# Patient Record
Sex: FEMALE | Race: WHITE | NOT HISPANIC OR LATINO | Employment: OTHER | URBAN - METROPOLITAN AREA
[De-identification: names, ages, dates, MRNs, and addresses within clinical notes are randomized per-mention and may not be internally consistent; named-entity substitution may affect disease eponyms.]

---

## 2017-01-03 ENCOUNTER — HOSPITAL ENCOUNTER (OUTPATIENT)
Dept: LAB | Facility: MEDICAL CENTER | Age: 82
End: 2017-01-03
Attending: INTERNAL MEDICINE
Payer: MEDICARE

## 2017-01-03 LAB
ANION GAP SERPL CALC-SCNC: 5 MMOL/L (ref 0–11.9)
BUN SERPL-MCNC: 24 MG/DL (ref 8–22)
CALCIUM SERPL-MCNC: 9.8 MG/DL (ref 8.5–10.5)
CHLORIDE SERPL-SCNC: 107 MMOL/L (ref 96–112)
CO2 SERPL-SCNC: 26 MMOL/L (ref 20–33)
CREAT SERPL-MCNC: 0.82 MG/DL (ref 0.5–1.4)
GLUCOSE SERPL-MCNC: 86 MG/DL (ref 65–99)
POTASSIUM SERPL-SCNC: 3.7 MMOL/L (ref 3.6–5.5)
SODIUM SERPL-SCNC: 138 MMOL/L (ref 135–145)

## 2017-01-03 PROCEDURE — 80048 BASIC METABOLIC PNL TOTAL CA: CPT

## 2017-01-03 PROCEDURE — 36415 COLL VENOUS BLD VENIPUNCTURE: CPT

## 2017-03-02 ENCOUNTER — HOSPITAL ENCOUNTER (OUTPATIENT)
Dept: LAB | Facility: MEDICAL CENTER | Age: 82
End: 2017-03-02
Attending: INTERNAL MEDICINE
Payer: MEDICARE

## 2017-03-02 LAB
ALBUMIN SERPL BCP-MCNC: 4.2 G/DL (ref 3.2–4.9)
ALBUMIN/GLOB SERPL: 1.2 G/DL
ALP SERPL-CCNC: 104 U/L (ref 30–99)
ALT SERPL-CCNC: 13 U/L (ref 2–50)
ANION GAP SERPL CALC-SCNC: 7 MMOL/L (ref 0–11.9)
AST SERPL-CCNC: 20 U/L (ref 12–45)
BASOPHILS # BLD AUTO: 0.05 K/UL (ref 0–0.12)
BASOPHILS NFR BLD AUTO: 0.8 % (ref 0–1.8)
BILIRUB SERPL-MCNC: 0.7 MG/DL (ref 0.1–1.5)
BUN SERPL-MCNC: 26 MG/DL (ref 8–22)
CALCIUM SERPL-MCNC: 10.5 MG/DL (ref 8.5–10.5)
CHLORIDE SERPL-SCNC: 107 MMOL/L (ref 96–112)
CO2 SERPL-SCNC: 27 MMOL/L (ref 20–33)
CREAT SERPL-MCNC: 1.02 MG/DL (ref 0.5–1.4)
EOSINOPHIL # BLD: 0.16 K/UL (ref 0–0.51)
EOSINOPHIL NFR BLD AUTO: 2.4 % (ref 0–6.9)
ERYTHROCYTE [DISTWIDTH] IN BLOOD BY AUTOMATED COUNT: 46.8 FL (ref 35.9–50)
GLOBULIN SER CALC-MCNC: 3.5 G/DL (ref 1.9–3.5)
GLUCOSE SERPL-MCNC: 90 MG/DL (ref 65–99)
HCT VFR BLD AUTO: 44.2 % (ref 37–47)
HGB BLD-MCNC: 13.8 G/DL (ref 12–16)
IMM GRANULOCYTES # BLD AUTO: 0.03 K/UL (ref 0–0.11)
IMM GRANULOCYTES NFR BLD AUTO: 0.5 % (ref 0–0.9)
LYMPHOCYTES # BLD: 1.4 K/UL (ref 1–4.8)
LYMPHOCYTES NFR BLD AUTO: 21 % (ref 22–41)
MCH RBC QN AUTO: 29.5 PG (ref 27–33)
MCHC RBC AUTO-ENTMCNC: 31.2 G/DL (ref 33.6–35)
MCV RBC AUTO: 94.4 FL (ref 81.4–97.8)
MONOCYTES # BLD: 0.57 K/UL (ref 0–0.85)
MONOCYTES NFR BLD AUTO: 8.6 % (ref 0–13.4)
NEUTROPHILS # BLD: 4.45 K/UL (ref 2–7.15)
NEUTROPHILS NFR BLD AUTO: 66.7 % (ref 44–72)
NRBC # BLD AUTO: 0 K/UL
NRBC BLD-RTO: 0 /100 WBC
PLATELET # BLD AUTO: 156 K/UL (ref 164–446)
PMV BLD AUTO: 9.9 FL (ref 9–12.9)
POTASSIUM SERPL-SCNC: 4.3 MMOL/L (ref 3.6–5.5)
PROT SERPL-MCNC: 7.7 G/DL (ref 6–8.2)
RBC # BLD AUTO: 4.68 M/UL (ref 4.2–5.4)
SODIUM SERPL-SCNC: 141 MMOL/L (ref 135–145)
TSH SERPL DL<=0.005 MIU/L-ACNC: 3.53 UIU/ML (ref 0.3–3.7)
WBC # BLD AUTO: 6.7 K/UL (ref 4.8–10.8)

## 2017-03-02 PROCEDURE — 84443 ASSAY THYROID STIM HORMONE: CPT

## 2017-03-02 PROCEDURE — 36415 COLL VENOUS BLD VENIPUNCTURE: CPT

## 2017-03-02 PROCEDURE — 85025 COMPLETE CBC W/AUTO DIFF WBC: CPT

## 2017-03-02 PROCEDURE — 80053 COMPREHEN METABOLIC PANEL: CPT

## 2017-03-03 ENCOUNTER — NON-PROVIDER VISIT (OUTPATIENT)
Dept: CARDIOLOGY | Facility: MEDICAL CENTER | Age: 82
End: 2017-03-03
Payer: MEDICARE

## 2017-03-03 DIAGNOSIS — R00.2 PALPITATIONS: ICD-10-CM

## 2017-03-07 DIAGNOSIS — R00.2 PALPITATIONS: ICD-10-CM

## 2017-03-08 ENCOUNTER — HOSPITAL ENCOUNTER (OUTPATIENT)
Dept: RADIOLOGY | Facility: MEDICAL CENTER | Age: 82
End: 2017-03-08
Attending: INTERNAL MEDICINE
Payer: MEDICARE

## 2017-03-08 DIAGNOSIS — R00.2 PALPITATIONS: ICD-10-CM

## 2017-03-08 LAB — EKG IMPRESSION: NORMAL

## 2017-03-08 PROCEDURE — 93224 XTRNL ECG REC UP TO 48 HRS: CPT | Performed by: INTERNAL MEDICINE

## 2017-03-08 PROCEDURE — 700111 HCHG RX REV CODE 636 W/ 250 OVERRIDE (IP)

## 2017-03-08 PROCEDURE — A9555 RB82 RUBIDIUM: HCPCS

## 2017-03-08 PROCEDURE — 93017 CV STRESS TEST TRACING ONLY: CPT

## 2017-03-09 NOTE — PROCEDURES
PET MYOCARDIAL PERFUSION IMAGING SCAN    AGE:  86.    GENDER:  Female.    HEIGHT:  4 feet 10 inches.    WEIGHT:  127 pounds.    INDICATIONS:  Palpitations.    PROCEDURE:  The patient reviewed and signed the acknowledgement for testing   form.  The patient was in a fasting state and was properly prepared for   testing.  An intravenous line was inserted and a flush of normal saline   followed to insure line patency.    A transmission scan was acquired for attenuation correction using the internal   Germanium sources.  The patient was then administered 20.0 mCi of   Rubidium-82.  Approximately 90 seconds after the infusion, resting imagine   were obtained with ECG-gating.  Following the resting series, the patient   administered 33.0 mg of dipyridamole over four minutes.  The blood pressure,   heart rate and ECG were monitored and recorded.  After the dipyridamole   infusion was completed, another transmission scan for attenuation correction   was obtained.  The patient was then administered 20.0 mCi of Rubidium-82.    Approximately 90 seconds after the infusion, Peak stress images were obtained   with ECG-gating.    CLINICAL RESPONSE:  Resting blood pressure was 141/86 with a heart rate of 61.    Immediately post-dipyridamole infusion the blood pressure was 134/59 with a   heart rate of 76.  After a recovery period the blood pressure was 124/59 with   a heart rate of 79.    The patient experienced neck tightness, abdominal discomfort during testing.    Aminophylline 100 mg was administered following the scan.    ELECTROCARDIOGRAPHIC FINDINGS:  Baseline rhythm is sinus with no ischemic   changes.  With chemical stress, there are no ischemic EKG changes.    SCINTOGRAPHIC FINDINGS:  The QC data was reviewed and within acceptable   limits.  There is diaphragmatic attenuation noted.    GATED WALL MOTION FINDINGS:  Left ventricular ejection fraction is normal at   89% post-stress.    CONCLUSIONS AND IMPRESSIONS:  No  ischemic defects nor perfusion abnormality.    Normal left ventricular ejection fraction.       ____________________________________     MD ALEXYS SCANLON / YO    DD:  03/08/2017 17:29:14  DT:  03/08/2017 18:12:46    D#:  [id number]  Job#:  [id number]

## 2017-04-13 ENCOUNTER — OFFICE VISIT (OUTPATIENT)
Dept: INTERNAL MEDICINE | Facility: MEDICAL CENTER | Age: 82
End: 2017-04-13
Payer: MEDICARE

## 2017-04-13 VITALS
DIASTOLIC BLOOD PRESSURE: 80 MMHG | OXYGEN SATURATION: 96 % | HEART RATE: 86 BPM | SYSTOLIC BLOOD PRESSURE: 140 MMHG | TEMPERATURE: 98.4 F | BODY MASS INDEX: 26.09 KG/M2 | WEIGHT: 124.8 LBS

## 2017-04-13 DIAGNOSIS — I10 ESSENTIAL HYPERTENSION: ICD-10-CM

## 2017-04-13 DIAGNOSIS — E55.9 VITAMIN D DEFICIENCY: ICD-10-CM

## 2017-04-13 DIAGNOSIS — R42 DIZZINESS: ICD-10-CM

## 2017-04-13 DIAGNOSIS — M85.80 OSTEOPENIA: ICD-10-CM

## 2017-04-13 DIAGNOSIS — M19.019 PRIMARY OSTEOARTHRITIS OF SHOULDER, UNSPECIFIED LATERALITY: ICD-10-CM

## 2017-04-13 DIAGNOSIS — R07.89 CHEST DISCOMFORT: ICD-10-CM

## 2017-04-13 DIAGNOSIS — R10.84 GENERALIZED ABDOMINAL PAIN: ICD-10-CM

## 2017-04-13 PROCEDURE — G8419 CALC BMI OUT NRM PARAM NOF/U: HCPCS | Performed by: INTERNAL MEDICINE

## 2017-04-13 PROCEDURE — 1036F TOBACCO NON-USER: CPT | Performed by: INTERNAL MEDICINE

## 2017-04-13 PROCEDURE — G8432 DEP SCR NOT DOC, RNG: HCPCS | Performed by: INTERNAL MEDICINE

## 2017-04-13 PROCEDURE — G8598 ASA/ANTIPLAT THER USED: HCPCS | Performed by: INTERNAL MEDICINE

## 2017-04-13 PROCEDURE — 4040F PNEUMOC VAC/ADMIN/RCVD: CPT | Mod: 8P | Performed by: INTERNAL MEDICINE

## 2017-04-13 PROCEDURE — 1101F PT FALLS ASSESS-DOCD LE1/YR: CPT | Performed by: INTERNAL MEDICINE

## 2017-04-13 PROCEDURE — 99214 OFFICE O/P EST MOD 30 MIN: CPT | Performed by: INTERNAL MEDICINE

## 2017-04-13 RX ORDER — ACETAMINOPHEN 325 MG/1
650 TABLET ORAL EVERY 4 HOURS PRN
COMMUNITY
End: 2018-08-17

## 2017-04-13 NOTE — PATIENT INSTRUCTIONS
Increase acetaminophen to 325 to two pills and then 3 pills if needed  Will refer to bone density  Will send back to [facility]  Start miralax at 1/2 scoop per day and increase to two 1/2 scoops per day

## 2017-04-13 NOTE — MR AVS SNAPSHOT
Destiny Burns   2017 8:40 AM   Office Visit   MRN: [id number]    Department:  [facility] Med - Internal Med   Dept Phone:  [phone]    Description:  Female : 1930   Provider:  Jaswinder Iqbal M.D.           Reason for Visit     Follow-Up blood pressure    Leg Injury left leg redness    Pain           Allergies as of 2017     Allergen Noted Reactions    Acyclovir 2016       Ciprofloxacin 2016       Citalopram 2016       Hydralazine Hcl 2016       Lyrica 2016       Neurontin [Gabapentin] 2016       Norvasc [Amlodipine] 2016       Morphine 2016         You were diagnosed with     Chest discomfort   [940030]       Generalized abdominal pain   [306435]       Primary osteoarthritis of shoulder, unspecified laterality   [601068]       Vitamin D deficiency   [6313952]       Osteopenia   [269845]       Essential hypertension   [7191356]       Dizziness   [056823]         Vital Signs     Blood Pressure Pulse Temperature Weight Oxygen Saturation Smoking Status    140/80 mmHg 86 36.9 °C (98.4 °F) 56.609 kg (124 lb 12.8 oz) 96% Never Smoker       Basic Information     Date Of Birth Sex Race Ethnicity Preferred Language    1930 Female White Non- English      Your appointments     May 25, 2017  8:40 AM   Established Patient with Jaswinder Iqbal M.D.   [facility] Med - Internal Medicine (--)    [address] [place] [address] [id number] [phone]           You will be receiving a confirmation call a few days before your appointment from our automated call confirmation system.              Problem List              ICD-10-CM Priority Class Noted - Resolved    Essential hypertension, benign I10   2015 - Present    Esophageal reflux K21.9   2015 - Present    Cervical disc disease M50.90   2015 - Present    Heart murmur, systolic R01.1   2016 - Present    LLQ abdominal mass R19.04   2016 -  Present    Leg cramps R25.2   5/26/2016 - Present    Chronic neck pain M54.2, G89.29   5/26/2016 - Present    Dyslipidemia E78.5   5/26/2016 - Present    WILLIAN (renal artery stenosis) (CMS-HCC) I70.1   5/26/2016 - Present    Palpitations R00.2   5/26/2016 - Present    Chronic venous insufficiency I87.2   5/26/2016 - Present    Hypertension I10   5/26/2016 - Present    Generalized abdominal pain R10.84   7/21/2016 - Present    Shakiness R25.1   7/21/2016 - Present    Vitamin D deficiency E55.9   4/13/2017 - Present    Osteopenia M85.80   4/13/2017 - Present    Dizziness R42   4/13/2017 - Present    Primary osteoarthritis of shoulder M19.019   4/13/2017 - Present    Chest discomfort R07.89   4/13/2017 - Present      Health Maintenance        Date Due Completion Dates    IMM DTaP/Tdap/Td Vaccine (1 - Tdap) 9/24/1949 ---    PAP SMEAR 9/24/1951 ---    IMM ZOSTER VACCINE 9/24/1990 ---    IMM PNEUMOCOCCAL 65+ (ADULT) LOW/MEDIUM RISK SERIES (1 of 2 - PCV13) 9/24/1995 ---    BONE DENSITY 2/24/2016 2/24/2011 (Prv Comp)    Override on 2/24/2011: Previously completed    MAMMOGRAM 10/7/2017 10/7/2016, 9/30/2015, 9/29/2014, 9/27/2013, 9/26/2012, 9/21/2011, 9/20/2010, 9/18/2009, 9/18/2009, 9/15/2008, 9/15/2008, 9/11/2007, 9/11/2007, 9/1/2006, 9/29/2005, 9/27/2004            Current Immunizations     Influenza Vaccine Adult HD 9/29/2016      Below and/or attached are the medications your provider expects you to take. Review all of your home medications and newly ordered medications with your provider and/or pharmacist. Follow medication instructions as directed by your provider and/or pharmacist. Please keep your medication list with you and share with your provider. Update the information when medications are discontinued, doses are changed, or new medications (including over-the-counter products) are added; and carry medication information at all times in the event of emergency situations     Allergies:  ACYCLOVIR - (reactions not  documented)     CIPROFLOXACIN - (reactions not documented)     CITALOPRAM - (reactions not documented)     HYDRALAZINE HCL - (reactions not documented)     LYRICA - (reactions not documented)     NEURONTIN - (reactions not documented)     NORVASC - (reactions not documented)     MORPHINE - (reactions not documented)               Medications  Valid as of: May 01, 2017 -  9:44 AM    Generic Name Brand Name Tablet Size Instructions for use    Acetaminophen (Tab) TYLENOL 325 MG Take 650 mg by mouth every four hours as needed.        Aspirin (Chew Tab) ASA 81 MG Take 81 mg by mouth every day.        Cholecalciferol (Tab) cholecalciferol 1000 UNIT Take 1,000 Units by mouth every day.        Docusate Sodium (Cap) COLACE 100 MG Take 100 mg by mouth 2 times a day.        Doxazosin Mesylate (Tab) CARDURA 2 MG 2 pills at night        LORazepam (Tab) ATIVAN 0.5 MG Take 0.5 mg by mouth every four hours as needed for Anxiety.        Multiple Vitamins-Minerals (Cap) PRESERVISION AREDS 2  Take  by mouth.        Pantoprazole Sodium (Tablet Delayed Response) PROTONIX 40 MG TAKE 1 TABLET BY MOUTH ONCE DAILY        Polyethylene Glycol 3350   Take  by mouth.        RaNITidine HCl (Tab) ZANTAC 150 MG TAKE 1 TABLET BY MOUTH ONCE DAILY        Sennosides   Take  by mouth.        Simethicone (Chew Tab) MYLICON 80 MG Take 80 mg by mouth every 6 hours as needed.        Spironolactone (Tab) ALDACTONE 25 MG Half tablet daily        Valsartan (Tab) DIOVAN 320 MG Take 1 Tab by mouth every day.        .                 Medicines prescribed today were sent to:     [facility]    [address] [facility] [place] [address]    Phone: [phone] Fax: [phone]    Open 24 Hours?: No      Medication refill instructions:       If your prescription bottle indicates you have medication refills left, it is not necessary to call your provider’s office. Please contact your pharmacy and they will refill your  medication.    If your prescription bottle indicates you do not have any refills left, you may request refills at any time through one of the following ways: The online [employer] system (except Urgent Care), by calling your provider’s office, or by asking your pharmacy to contact your provider’s office with a refill request. Medication refills are processed only during regular business hours and may not be available until the next business day. Your provider may request additional information or to have a follow-up visit with you prior to refilling your medication.   *Please Note: Medication refills are assigned a new Rx number when refilled electronically. Your pharmacy may indicate that no refills were authorized even though a new prescription for the same medication is available at the pharmacy. Please request the medicine by name with the pharmacy before contacting your provider for a refill.        Your To Do List     Future Labs/Procedures Complete By Expires    BONE DENSITY TEST  As directed 4/13/2018      Instructions    Increase acetaminophen to 325 to two pills and then 3 pills if needed  Will refer to bone density  Will send back to [facility]  Start miralax at 1/2 scoop per day and increase to two 1/2 scoops per day          [employer] Access Code: [email or web address]  Expires: 5/31/2017  9:44 AM    [employer]  A secure, online tool to manage your health information     [employer]’s [employer]® is a secure, online tool that connects you to your personalized health information from the privacy of your home -- day or night - making it very easy for you to manage your healthcare. Once the activation process is completed, you can even access your medical information using the [employer] mera, which is available for free in the Apple Mera store or Google Play store.     [employer] provides the following levels of access (as shown below):   My Chart Features   [facility] Primary [facility] Doctor [facility]  Urgent  [facility]  [facility]  Primary [facility]  Doctor   Email your healthcare team securely and privately 24/7 X X X    Manage appointments: schedule your next appointment; view details of past/upcoming appointments X      Request prescription refills. X      View recent personal medical records, including lab and immunizations X X X X   View health record, including health history, allergies, medications X X X X   Read reports about your outpatient visits, procedures, consult and ER notes X X X X   See your discharge summary, which is a recap of your hospital and/or ER visit that includes your diagnosis, lab results, and care plan. X X       How to register for [employer]:  1. Go to  [email or web address].  2. Click on the Sign Up Now box, which takes you to the New Member Sign Up page. You will need to provide the following information:  a. Enter your [employer] Access Code exactly as it appears at the top of this page. (You will not need to use this code after you’ve completed the sign-up process. If you do not sign up before the expiration date, you must request a new code.)   b. Enter your date of birth.   c. Enter your home email address.   d. Click Submit, and follow the next screen’s instructions.  3. Create a [employer] ID. This will be your [employer] login ID and cannot be changed, so think of one that is secure and easy to remember.  4. Create a [employer] password. You can change your password at any time.  5. Enter your Password Reset Question and Answer. This can be used at a later time if you forget your password.   6. Enter your e-mail address. This allows you to receive e-mail notifications when new information is available in [employer].  7. Click Sign Up. You can now view your health information.    For assistance activating your [employer] account, call [phone]

## 2017-04-13 NOTE — PROGRESS NOTES
Established Patient    Ms. Lund a 86 y.o. female who presents today with:  CC: Follow-Up; Leg Injury; and Pain        Assessment and Plan    1. Chest discomfort  I suspect that her chest discomfort is actually esophageal spasms. Her PET scan was negative for cardiac ischemia. It appears that she has daily symptoms of gastroesophageal reflux disease and her chest pain radiates from the jaw all the way down the epigastrium. And from her description esophageal spasms is a very real possibility. It also appears that her Protonix and ranitidine are not controlling her daily symptoms. At one point she discussed with her gastroenterologist and EGD but this was not arranged. I suggest that she follow back with Dr. [facility] to discuss additional medications that may give her symptomatic relief.    2. Generalized abdominal pain  I agree that this is most likely constipation in nature. She does agree to try a half a scoop of MiraLAX at least once a day and if no improvement and she can tolerated increase to twice a day    3. Primary osteoarthritis of shoulder, unspecified laterality  I suggest that she increase her Tylenol Extra Strength 2-3 tablets per day as needed for pain. It appears that one tablet per day has provided some relief    4. Vitamin D deficiency  I will check a vitamin D level and bone density she should continue her vitamin D supplementation  - BONE DENSITY TEST; Future    5. Osteopenia  She had a prior history of osteopenia and with her history of vitamin D deficiency I will order a bone density  - BONE DENSITY TEST; Future    6. Essential hypertension  I suspected take control will be quite difficult in this patient. She would be at increased risk for decreased quality of life and side effects. This is currently being managed by Dr. Bloch. It appears that her average systolics are greater than 150 but her diastolics are in the 60-80 range. Fortunately she is not having side effects from her current  regimen.    7. Dizziness  I suspect that this is mostly orthostasis in nature. Fortunately it does not occur that often and she has not had any significant morbidity associated with this. I would not make any changes to her medications at this point      Current Outpatient Prescriptions   Medication Sig Dispense Refill   • vitamin D (CHOLECALCIFEROL) 1000 UNIT Tab Take 1,000 Units by mouth every day.     • acetaminophen (TYLENOL) 325 MG Tab Take 650 mg by mouth every four hours as needed.     • pantoprazole (PROTONIX) 40 MG Tablet Delayed Response TAKE 1 TABLET BY MOUTH ONCE DAILY 30 Tab 4   • ranitidine (ZANTAC) 150 MG Tab TAKE 1 TABLET BY MOUTH ONCE DAILY 30 Tab 5   • Multiple Vitamins-Minerals (PRESERVISION AREDS 2) Cap Take  by mouth.     • spironolactone (ALDACTONE) 25 MG Tab Half tablet daily 1 Tab 0   • valsartan (DIOVAN) 320 MG tablet Take 1 Tab by mouth every day. 30 Tab 3   • doxazosin (CARDURA) 2 MG Tab 2 pills at night 30 Tab 0   • Polyethylene Glycol 3350 (MIRALAX PO) Take  by mouth.     • Sennosides (SENNA LAX PO) Take  by mouth.     • docusate sodium (COLACE) 100 MG Cap Take 100 mg by mouth 2 times a day.     • lorazepam (ATIVAN) 0.5 MG Tab Take 0.5 mg by mouth every four hours as needed for Anxiety.     • aspirin (ASA) 81 MG CHEW chewable tablet Take 81 mg by mouth every day.     • simethicone (GAS-X) 80 MG CHEW Take 80 mg by mouth every 6 hours as needed.       No current facility-administered medications for this visit.         followup Return in about 6 weeks (around 5/25/2017) for Long.      _______________________________________________________    HPI: she has a long list of issues today. We addressed the most pressing and scheduled her for close followup for the others.   1. Chest discomfort  She is describing several months of atypical chest pain. She has episodic episodes of a sensation of pulling and palpitations in the central chest and a feeling of tachycardia after these events. When the  events occur they last for one half hour for 15 minutes in length. After the event is over she has a burning sensation from the bottom of the jaw down to her epigastric region. She has these episodes 1-2 times per week they are not depended on exertion and it can occur when she is at rest. It is not exacerbated by swallowing or eating however she has daily heartburn, regurgitation symptoms. She describes a sensation of food in her throat after she eats a meal and after significant swallowing it passes. She admits to dysphagia. The regurgitation symptoms occur almost every day. She denies any radiation of the chest pain to the left arm middle of the back or up the jaw. There is no diaphoresis or shortness of breath with these episodes. She currently is being treated with Protonix 40 mg daily plus ranitidine daily for several years. She is managed by Dr. [facility] of gastroenterology. She was seen several times last year. In August 2016 it appears that they had a conversation about an EGD but because of her age there was a risk of perforation and she decided not to do it.   She recently had a PET myocardial perfusion scan March 8 which was negative for ischemia  2. Generalized abdominal pain  Dr. [facility] has also been managing her chronic abdominal pain which is suspected to be secondary to constipation. It appears that he wanted her to take a combination of MiraLAX and Senokot but she was afraid of taking 2 medications and therefore she is taking nothing. Her pain is described as a 2 out of 3 bilateral lower abdomen and she does have daily constipation. She has no nausea or vomiting, hematemesis, melena, change in the caliber of her stool. The discomfort is not reproducible by touch. It feels as though she is bloated.    3. Primary osteoarthritis of shoulder, unspecified laterality  She has arthritis of the bilateral shoulders but chronic joint pain including the hands and knees, lower back and sometimes ankles. At the  last visit I suggested that she take Tylenol Extra Strength once a day. This helps her at night with sleep. She does not take any during the day. She denies any joint swelling, redness, fever, rash, trauma.    4. Vitamin D deficiency  She has a history of vitamin D deficiency. She also has history of osteopenia and has not had a bone density in quite some time. She is taking supplemental vitamin D over-the-counter. She denies any recent fractures or falls.    5. Osteopenia  She has a history of osteopenia diagnosed via DEXA scan several years ago. She has no history of fractures, alcohol use, tobacco use, family history of osteoporosis, hyperparathyroidism or chronic steroid use    6. Essential hypertension  Dr.Bloch is managing her hypertension. She currently is being treated with spironolactone, valsartan, doxazosin. She has had multiple changes to her hypertensive regimen mostly because a report of side effects associated with her medications. Currently she reports no side effects with her current regimen of 3 drugs. In the past it was suggested that she have renal artery stenosis surgery but she refused to have this done. She brought in her blood pressure report today and she has some readings of her blood pressure from systolics in the 180s to the 110s diastolic mostly in the 60-80 range. She is compliant with her antihypertensive daily.     7. Dizziness  She describes very occasional lightheadedness after position change. This does not occur every day. She has had no falls or syncope associated with this. She denies any vertigo. She denies any muscular weakness, neuropathy, change in vision, headache. She denies any bleeding       has a past medical history of LLQ abdominal mass (5/26/2016); WILLIAN (renal artery stenosis) (CMS-HCC) (5/26/2016); Palpitations (5/26/2016); Chronic venous insufficiency (5/26/2016); and Hypertension (5/26/2016).     reports that she has never smoked. She has never used smokeless  tobacco. She reports that she does not drink alcohol or use illicit drugs.      ROS: As per HPI. Additional pertinent symptoms as noted below:        Physical Exam  /80 mmHg  Pulse 86  Temp(Src) 36.9 °C (98.4 °F)  Wt 56.609 kg (124 lb 12.8 oz)  SpO2 96%  Constitutional:  oriented to person, place, and time. No distress.   Blood pressure standing is 140/92    Current Outpatient Prescriptions on File Prior to Visit   Medication Sig Dispense Refill   • pantoprazole (PROTONIX) 40 MG Tablet Delayed Response TAKE 1 TABLET BY MOUTH ONCE DAILY 30 Tab 4   • ranitidine (ZANTAC) 150 MG Tab TAKE 1 TABLET BY MOUTH ONCE DAILY 30 Tab 5   • Multiple Vitamins-Minerals (PRESERVISION AREDS 2) Cap Take  by mouth.     • spironolactone (ALDACTONE) 25 MG Tab Half tablet daily 1 Tab 0   • valsartan (DIOVAN) 320 MG tablet Take 1 Tab by mouth every day. 30 Tab 3   • doxazosin (CARDURA) 2 MG Tab 2 pills at night 30 Tab 0   • Polyethylene Glycol 3350 (MIRALAX PO) Take  by mouth.     • Sennosides (SENNA LAX PO) Take  by mouth.     • docusate sodium (COLACE) 100 MG Cap Take 100 mg by mouth 2 times a day.     • lorazepam (ATIVAN) 0.5 MG Tab Take 0.5 mg by mouth every four hours as needed for Anxiety.     • aspirin (ASA) 81 MG CHEW chewable tablet Take 81 mg by mouth every day.     • simethicone (GAS-X) 80 MG CHEW Take 80 mg by mouth every 6 hours as needed.       No current facility-administered medications on file prior to visit.           Signed by: Jaswinder Iqbal M.D.

## 2017-05-15 ENCOUNTER — TELEPHONE (OUTPATIENT)
Dept: INTERNAL MEDICINE | Facility: MEDICAL CENTER | Age: 82
End: 2017-05-15

## 2017-05-15 DIAGNOSIS — E55.9 VITAMIN D DEFICIENCY: ICD-10-CM

## 2017-05-15 DIAGNOSIS — M85.80 OSTEOPENIA, UNSPECIFIED LOCATION: ICD-10-CM

## 2017-05-15 NOTE — TELEPHONE ENCOUNTER
----- Message from Crystal Craven sent at 5/15/2017  1:03 PM PDT -----  Regarding: Dr Iqbal/order for osteoporosis and lab orders   Contact: [phone]  Patient states that our office informed her to go and get a scan for osteoporosis but when she called to schedule the scan they informed her that no orders were put in. So patient would like to know if she can get an order for it. Also patient wants to know if she needs labs done. Please call patient if you have any questions.

## 2017-05-16 RX ORDER — PANTOPRAZOLE SODIUM 40 MG/1
TABLET, DELAYED RELEASE ORAL
Qty: 30 TAB | Refills: 4 | Status: SHIPPED | OUTPATIENT
Start: 2017-05-16 | End: 2017-11-09 | Stop reason: SDUPTHER

## 2017-05-16 RX ORDER — RANITIDINE 150 MG/1
TABLET ORAL
Qty: 30 TAB | Refills: 5 | Status: SHIPPED | OUTPATIENT
Start: 2017-05-16 | End: 2018-01-11 | Stop reason: SDUPTHER

## 2017-05-16 NOTE — TELEPHONE ENCOUNTER
Was the patient seen in the last year in this department? Yes    Last seen: 04/13/17 by Dr. Iqbal  Next appt: 05/25/17 with Dr. Iqbal      Does patient have an active prescription for medications requested? No     Received Request Via: Pharmacy

## 2017-05-16 NOTE — TELEPHONE ENCOUNTER
Left voicemail for patient letting her know the orders are in her chart and she can call to set up appt with [phone] to get orders done.

## 2017-05-25 ENCOUNTER — OFFICE VISIT (OUTPATIENT)
Dept: INTERNAL MEDICINE | Facility: MEDICAL CENTER | Age: 82
End: 2017-05-25
Payer: MEDICARE

## 2017-05-25 VITALS
OXYGEN SATURATION: 95 % | TEMPERATURE: 98.5 F | SYSTOLIC BLOOD PRESSURE: 110 MMHG | DIASTOLIC BLOOD PRESSURE: 80 MMHG | BODY MASS INDEX: 25.92 KG/M2 | HEART RATE: 86 BPM | WEIGHT: 124 LBS

## 2017-05-25 DIAGNOSIS — I87.2 CHRONIC VENOUS INSUFFICIENCY: ICD-10-CM

## 2017-05-25 DIAGNOSIS — G89.29 CHRONIC NECK PAIN: ICD-10-CM

## 2017-05-25 DIAGNOSIS — M54.2 CHRONIC NECK PAIN: ICD-10-CM

## 2017-05-25 DIAGNOSIS — R07.89 CHEST DISCOMFORT: ICD-10-CM

## 2017-05-25 DIAGNOSIS — M85.80 OSTEOPENIA, UNSPECIFIED LOCATION: ICD-10-CM

## 2017-05-25 PROCEDURE — 1101F PT FALLS ASSESS-DOCD LE1/YR: CPT | Performed by: INTERNAL MEDICINE

## 2017-05-25 PROCEDURE — G8432 DEP SCR NOT DOC, RNG: HCPCS | Performed by: INTERNAL MEDICINE

## 2017-05-25 PROCEDURE — 99214 OFFICE O/P EST MOD 30 MIN: CPT | Performed by: INTERNAL MEDICINE

## 2017-05-25 PROCEDURE — G8419 CALC BMI OUT NRM PARAM NOF/U: HCPCS | Performed by: INTERNAL MEDICINE

## 2017-05-25 PROCEDURE — 1036F TOBACCO NON-USER: CPT | Performed by: INTERNAL MEDICINE

## 2017-05-25 PROCEDURE — G8598 ASA/ANTIPLAT THER USED: HCPCS | Performed by: INTERNAL MEDICINE

## 2017-05-25 PROCEDURE — 4040F PNEUMOC VAC/ADMIN/RCVD: CPT | Mod: 8P | Performed by: INTERNAL MEDICINE

## 2017-05-25 ASSESSMENT — PATIENT HEALTH QUESTIONNAIRE - PHQ9: CLINICAL INTERPRETATION OF PHQ2 SCORE: 0

## 2017-05-25 NOTE — MR AVS SNAPSHOT
Destiny Burns   2017 8:40 AM   Office Visit   MRN: [id number]    Department:  [facility] Med - Internal Med   Dept Phone:  [phone]    Description:  Female : 1930   Provider:  Jaswinder Iqbal M.D.           Reason for Visit     Follow-Up left foot on redness, blood pressure      Allergies as of 2017     Allergen Noted Reactions    Acyclovir 2016       Ciprofloxacin 2016       Citalopram 2016       Hydralazine Hcl 2016       Lyrica 2016       Neurontin [Gabapentin] 2016       Norvasc [Amlodipine] 2016       Morphine 2016         You were diagnosed with     Chronic neck pain   [616699]         Vital Signs     Blood Pressure Pulse Temperature Weight Oxygen Saturation Smoking Status    110/80 mmHg 86 36.9 °C (98.5 °F) 56.246 kg (124 lb) 95% Never Smoker       Basic Information     Date Of Birth Sex Race Ethnicity Preferred Language    1930 Female White Non- English      Your appointments     2017  8:00 AM   BONE DENSITY (DEXASCAN) with RB BD 1   [facility] BREAST [facility]    [address]  [address]  [facility] [address] [phone]           No calcium supplements 24 hours prior to exam, including antacids or multivitamins w/calcium.  Pt may eat and drink normally.  No injection procedures prior to Dexa on the same day.  No barium contrast, no CTs with IV or oral contrast, no Pet/CTs and no Nuc Med injections for 7 days prior to a Dexa (including Barium Swallow, Upper GI, Small Bowel Series).  If scheduling a Dexa on the same day as a CT with IV or oral contrast, any test with barium, Pet/CT or a Nuc Med with injection, always schedule the Dexa before the other study.            Aug 31, 2017  8:20 AM   Established Patient with Jaswinder Iqbal M.D.   [facility] - Internal Medicine (--)    [address] [place] [address]  [address]  [facility] [address] [phone]           You will be  receiving a confirmation call a few days before your appointment from our automated call confirmation system.              Problem List              ICD-10-CM Priority Class Noted - Resolved    Essential hypertension, benign I10   2/24/2015 - Present    Esophageal reflux K21.9   2/24/2015 - Present    Cervical disc disease M50.90   2/24/2015 - Present    Heart murmur, systolic R01.1   5/26/2016 - Present    LLQ abdominal mass R19.04   5/26/2016 - Present    Leg cramps R25.2   5/26/2016 - Present    Chronic neck pain M54.2, G89.29   5/26/2016 - Present    Dyslipidemia E78.5   5/26/2016 - Present    WILLIAN (renal artery stenosis) (CMS-HCC) I70.1   5/26/2016 - Present    Palpitations R00.2   5/26/2016 - Present    Chronic venous insufficiency I87.2   5/26/2016 - Present    Hypertension I10   5/26/2016 - Present    Generalized abdominal pain R10.84   7/21/2016 - Present    Shakiness R25.1   7/21/2016 - Present    Vitamin D deficiency E55.9   4/13/2017 - Present    Osteopenia M85.80   4/13/2017 - Present    Dizziness R42   4/13/2017 - Present    Primary osteoarthritis of shoulder M19.019   4/13/2017 - Present    Chest discomfort R07.89   4/13/2017 - Present      Health Maintenance        Date Due Completion Dates    IMM DTaP/Tdap/Td Vaccine (1 - Tdap) 9/24/1949 ---    PAP SMEAR 9/24/1951 ---    IMM ZOSTER VACCINE 9/24/1990 ---    IMM PNEUMOCOCCAL 65+ (ADULT) LOW/MEDIUM RISK SERIES (1 of 2 - PCV13) 9/24/1995 ---    BONE DENSITY 2/24/2016 2/24/2011 (Prv Comp)    Override on 2/24/2011: Previously completed    MAMMOGRAM 10/7/2017 10/7/2016, 9/30/2015, 9/29/2014, 9/27/2013, 9/26/2012, 9/21/2011, 9/20/2010, 9/18/2009, 9/18/2009, 9/15/2008, 9/15/2008, 9/11/2007, 9/11/2007, 9/1/2006, 9/29/2005, 9/27/2004            Current Immunizations     Influenza Vaccine Adult HD 9/29/2016      Below and/or attached are the medications your provider expects you to take. Review all of your home medications and newly ordered medications with your  provider and/or pharmacist. Follow medication instructions as directed by your provider and/or pharmacist. Please keep your medication list with you and share with your provider. Update the information when medications are discontinued, doses are changed, or new medications (including over-the-counter products) are added; and carry medication information at all times in the event of emergency situations     Allergies:  ACYCLOVIR - (reactions not documented)     CIPROFLOXACIN - (reactions not documented)     CITALOPRAM - (reactions not documented)     HYDRALAZINE HCL - (reactions not documented)     LYRICA - (reactions not documented)     NEURONTIN - (reactions not documented)     NORVASC - (reactions not documented)     MORPHINE - (reactions not documented)               Medications  Valid as of: May 25, 2017 -  9:28 AM    Generic Name Brand Name Tablet Size Instructions for use    Acetaminophen (Tab) TYLENOL 325 MG Take 650 mg by mouth every four hours as needed.        Aspirin (Chew Tab) ASA 81 MG Take 81 mg by mouth every day.        Cholecalciferol (Tab) cholecalciferol 1000 UNIT Take 1,000 Units by mouth every day.        Docusate Sodium (Cap) COLACE 100 MG Take 100 mg by mouth 2 times a day.        Doxazosin Mesylate (Tab) CARDURA 2 MG 2 pills at night        LORazepam (Tab) ATIVAN 0.5 MG Take 0.5 mg by mouth every four hours as needed for Anxiety.        Multiple Vitamins-Minerals (Cap) PRESERVISION AREDS 2  Take  by mouth.        Pantoprazole Sodium (Tablet Delayed Response) PROTONIX 40 MG TAKE 1 TABLET BY MOUTH ONCE DAILY        Polyethylene Glycol 3350   Take  by mouth.        RaNITidine HCl (Tab) ZANTAC 150 MG TAKE 1 TABLET BY MOUTH ONCE DAILY        Sennosides   Take 8.6 mg by mouth. Indications: 2 mon, wed, fri        Simethicone (Chew Tab) MYLICON 80 MG Take 80 mg by mouth every 6 hours as needed.        Spironolactone (Tab) ALDACTONE 25 MG Half tablet daily        Valsartan (Tab) DIOVAN 320 MG Take 1  Tab by mouth every day.        .                 Medicines prescribed today were sent to:     [facility] - [place], [place] - [address]    [address] [place] [address]    Phone: [phone] Fax: [phone]    Open 24 Hours?: No      Medication refill instructions:       If your prescription bottle indicates you have medication refills left, it is not necessary to call your provider’s office. Please contact your pharmacy and they will refill your medication.    If your prescription bottle indicates you do not have any refills left, you may request refills at any time through one of the following ways: The online [facility] system (except Urgent Care), by calling your provider’s office, or by asking your pharmacy to contact your provider’s office with a refill request. Medication refills are processed only during regular business hours and may not be available until the next business day. Your provider may request additional information or to have a follow-up visit with you prior to refilling your medication.   *Please Note: Medication refills are assigned a new Rx number when refilled electronically. Your pharmacy may indicate that no refills were authorized even though a new prescription for the same medication is available at the pharmacy. Please request the medicine by name with the pharmacy before contacting your provider for a refill.        Referral     A referral request has been sent to our patient care coordination department. Please allow 3-5 business days for us to process this request and contact you either by phone or mail. If you do not hear from us by the 5th business day, please call us at [phone].        Instructions    I will refer you to Laina  I will refer you to the pain clinic as well.  Wear tubigrip as directed.          [facility] Access Code: [email or web address]  Expires: 5/31/2017  9:44 AM    [facility]  A secure, online tool to manage your health information          [employer]’s [email or web address]® is a secure, online tool that connects you to your personalized health information from the privacy of your home -- day or night - making it very easy for you to manage your healthcare. Once the activation process is completed, you can even access your medical information using the [email or web address] mera, which is available for free in the Apple Mera store or Google Play store.     [email or web address] provides the following levels of access (as shown below):   My Chart Features   [facility] Primary Care Doctor [facility]  Specialists [facility]  Urgent  Care Non-[facility]  Primary Care  Doctor   Email your healthcare team securely and privately 24/7 X X X    Manage appointments: schedule your next appointment; view details of past/upcoming appointments X      Request prescription refills. X      View recent personal medical records, including lab and immunizations X X X X   View health record, including health history, allergies, medications X X X X   Read reports about your outpatient visits, procedures, consult and ER notes X X X X   See your discharge summary, which is a recap of your hospital and/or ER visit that includes your diagnosis, lab results, and care plan. X X       How to register for [email or web address]:  1. Go to  [email or web address].  2. Click on the Sign Up Now box, which takes you to the New Member Sign Up page. You will need to provide the following information:  a. Enter your [email or web address] Access Code exactly as it appears at the top of this page. (You will not need to use this code after you’ve completed the sign-up process. If you do not sign up before the expiration date, you must request a new code.)   b. Enter your date of birth.   c. Enter your home email address.   d. Click Submit, and follow the next screen’s instructions.  3. Create a [email or web address] ID. This will be your [email or web address] login ID and cannot be changed, so think of one that is secure and easy to remember.  4. Create a [email or web address] password. You can change your  password at any time.  5. Enter your Password Reset Question and Answer. This can be used at a later time if you forget your password.   6. Enter your e-mail address. This allows you to receive e-mail notifications when new information is available in [employer].  7. Click Sign Up. You can now view your health information.    For assistance activating your [employer] account, call [phone]

## 2017-05-25 NOTE — PATIENT INSTRUCTIONS
I will refer you to Laina  I will refer you to the pain clinic as well.  Wear tubigrip as directed.

## 2017-05-26 NOTE — PROGRESS NOTES
Established Patient    Ms. Lund a 86 y.o. female who presents today with:  CC: Follow-Up        Assessment and Plan    1. Chronic neck pain  She has chronic cervical degenerative disease. She is constantly flexed at the neck. Her signs and symptoms are consistent with chronic cervical neck strain. Because of her age I think that there is a bigger risk for managing her pain with pharmaceutical agents. I strongly suggested that she consider trigger point injections or other types of steroid injections to manage her pain. She agrees therefore I will refer her to pain management  - REFERRAL TO PAIN CLINIC    2. Chest discomfort  This is most likely secondary to gastroesophageal reflux disease currently she has no complaints and feels well. Continue with her current regimen of Zantac with the PPI    3. Osteopenia, unspecified location  She has a bone density scheduled in June 4. Chronic venous insufficiency  Informed her that she will most likely always have discoloration of the lateral part of her left leg. If the mild compression dressings were too tight, I provided her with Tubigrip to use at home instead of her compression socks.      Current Outpatient Prescriptions   Medication Sig Dispense Refill   • pantoprazole (PROTONIX) 40 MG Tablet Delayed Response TAKE 1 TABLET BY MOUTH ONCE DAILY 30 Tab 4   • ranitidine (ZANTAC) 150 MG Tab TAKE 1 TABLET BY MOUTH ONCE DAILY 30 Tab 5   • vitamin D (CHOLECALCIFEROL) 1000 UNIT Tab Take 1,000 Units by mouth every day.     • acetaminophen (TYLENOL) 325 MG Tab Take 650 mg by mouth every four hours as needed.     • Multiple Vitamins-Minerals (PRESERVISION AREDS 2) Cap Take  by mouth.     • spironolactone (ALDACTONE) 25 MG Tab Half tablet daily (Patient taking differently: Take 50 mg by mouth every day. Half tablet daily  Indications: 1/2 with food daily) 1 Tab 0   • valsartan (DIOVAN) 320 MG tablet Take 1 Tab by mouth every day. 30 Tab 3   • doxazosin (CARDURA) 2 MG Tab 2  "pills at night (Patient taking differently: Take 2 mg by mouth 2 Times a Day. 2 pills at night) 30 Tab 0   • Polyethylene Glycol 3350 (MIRALAX PO) Take  by mouth.     • Sennosides (SENNA LAX PO) Take 8.6 mg by mouth. Indications: 2 mon, wed, fri     • docusate sodium (COLACE) 100 MG Cap Take 100 mg by mouth 2 times a day.     • aspirin (ASA) 81 MG CHEW chewable tablet Take 81 mg by mouth every day.     • lorazepam (ATIVAN) 0.5 MG Tab Take 0.5 mg by mouth every four hours as needed for Anxiety.     • simethicone (GAS-X) 80 MG CHEW Take 80 mg by mouth every 6 hours as needed.       No current facility-administered medications for this visit.         followup Return in about 3 months (around 8/25/2017) for Long.      _______________________________________________________    HPI:   1. Chronic neck pain  She wants to discuss her chronic neck pain. In 2008 she had cervical spine surgery for chronic pain. Records are not available to review she may have had a laminectomy. Regardless she states that she has \"hardware\" in her neck. Prior to the surgery she appeared to have significant resting flexion at the neck that was interfering with her breathing and ability to swallow. After the surgery she still had significant flexion at the neck requiring significant energy to extend and resulting in pain to hold her neck straight. Therefore, she constantly is flexed at the neck. Over time her neck pain is gradually worsening. She describes significant pain lateral to both sides of the cervical spine. This is mostly in the trapezius, upper back muscles and some of the strap muscles of the neck. The pain is constant. She also has pain over the cervical spine to touch. She tries to take Tylenol for this. At the last visit I asked her to increase her Tylenol to Tylenol extra strength but instead she increased regular strength Tylenol one tablet per day to 2 or 3. It really hasn't helped much with the pain. She thinks that she had some " type of cervical neck injection shortly after her surgery in 2008. But she is not sure if it was helpful or not.    2. Chest discomfort  At the last visit she had atypical chest pain and we suspect it was secondary to gastroesophageal reflux disease. She currently is being treated with Protonix and ranitidine. Sees discussed this with her gastroenterologist in the past. There was some discussion of an EGD for evaluation but the patient states that her gastroenterologist thought it would not be beneficial. At the last visit I did ask her to call Dr. Marina to 7 appointment to discuss treatment options for her gastroesophageal reflux disease. She did not do that. Fortunately she did not have any problems with atypical chest pain at this moment and her gastroesophageal reflux disease is relatively well controlled.    3. Osteopenia, unspecified location  She has a history of osteopenia and she has a bone density pending for June 16, 2017    4. Chronic venous insufficiency  She has chronic venous insufficiency. In 2016 she had a fall and developed a large left lateral leg hematoma. She was ultimately referred to the wound care center for management. The hematoma has resolved however the overlying skin is hyperpigmented. She wants to know if the hyperpigmented area will ever improve and his color. She has no increased pain in that area. I have prescribed low compression hoses for her to use. She doesn't most occasions but the top of the hose actually causes significant dimpling that she doesn't like. She denies any ulcerations, pain, cellulitis.       has a past medical history of LLQ abdominal mass (5/26/2016); WILLIAN (renal artery stenosis) (CMS-HCC) (5/26/2016); Palpitations (5/26/2016); Chronic venous insufficiency (5/26/2016); and Hypertension (5/26/2016).     reports that she has never smoked. She has never used smokeless tobacco. She reports that she does not drink alcohol or use illicit drugs.      ROS: As per HPI.  Additional pertinent symptoms as noted below:        Physical Exam  /80 mmHg  Pulse 86  Temp(Src) 36.9 °C (98.5 °F)  Wt 56.246 kg (124 lb)  SpO2 95%  musculoskeletal: Cervical neck flexion at 45° which when patient tries to extend the cervical spine produces pain. Generalized tenderness to palpation in several of the muscle groups of the bilateral upper back and neck. Tenderness slight to palpation of the proximal C6 C5 area. Extremities: Bilateral mild pitting edema with no ulcerations, drainage or erythema or redness        Current Outpatient Prescriptions on File Prior to Visit   Medication Sig Dispense Refill   • pantoprazole (PROTONIX) 40 MG Tablet Delayed Response TAKE 1 TABLET BY MOUTH ONCE DAILY 30 Tab 4   • ranitidine (ZANTAC) 150 MG Tab TAKE 1 TABLET BY MOUTH ONCE DAILY 30 Tab 5   • vitamin D (CHOLECALCIFEROL) 1000 UNIT Tab Take 1,000 Units by mouth every day.     • acetaminophen (TYLENOL) 325 MG Tab Take 650 mg by mouth every four hours as needed.     • Multiple Vitamins-Minerals (PRESERVISION AREDS 2) Cap Take  by mouth.     • spironolactone (ALDACTONE) 25 MG Tab Half tablet daily (Patient taking differently: Take 50 mg by mouth every day. Half tablet daily  Indications: 1/2 with food daily) 1 Tab 0   • valsartan (DIOVAN) 320 MG tablet Take 1 Tab by mouth every day. 30 Tab 3   • doxazosin (CARDURA) 2 MG Tab 2 pills at night (Patient taking differently: Take 2 mg by mouth 2 Times a Day. 2 pills at night) 30 Tab 0   • Polyethylene Glycol 3350 (MIRALAX PO) Take  by mouth.     • Sennosides (SENNA LAX PO) Take 8.6 mg by mouth. Indications: 2 mon, wed, fri     • docusate sodium (COLACE) 100 MG Cap Take 100 mg by mouth 2 times a day.     • aspirin (ASA) 81 MG CHEW chewable tablet Take 81 mg by mouth every day.     • lorazepam (ATIVAN) 0.5 MG Tab Take 0.5 mg by mouth every four hours as needed for Anxiety.     • simethicone (GAS-X) 80 MG CHEW Take 80 mg by mouth every 6 hours as needed.       No  current facility-administered medications on file prior to visit.           Signed by: Jaswinder Iqbal M.D.

## 2017-06-16 ENCOUNTER — HOSPITAL ENCOUNTER (OUTPATIENT)
Dept: RADIOLOGY | Facility: MEDICAL CENTER | Age: 82
End: 2017-06-16
Attending: INTERNAL MEDICINE
Payer: MEDICARE

## 2017-06-16 DIAGNOSIS — M85.80 OSTEOPENIA, UNSPECIFIED LOCATION: ICD-10-CM

## 2017-06-16 DIAGNOSIS — E55.9 VITAMIN D DEFICIENCY: ICD-10-CM

## 2017-06-16 PROCEDURE — 77080 DXA BONE DENSITY AXIAL: CPT

## 2017-08-10 ENCOUNTER — HOSPITAL ENCOUNTER (OUTPATIENT)
Dept: PHYSICAL THERAPY | Facility: REHABILITATION | Age: 82
End: 2017-08-10
Attending: PAIN MEDICINE
Payer: MEDICARE

## 2017-08-10 PROCEDURE — 97014 ELECTRIC STIMULATION THERAPY: CPT

## 2017-08-10 PROCEDURE — G8979 MOBILITY GOAL STATUS: HCPCS | Mod: CJ

## 2017-08-10 PROCEDURE — 97161 PT EVAL LOW COMPLEX 20 MIN: CPT

## 2017-08-10 PROCEDURE — G8978 MOBILITY CURRENT STATUS: HCPCS | Mod: CK

## 2017-08-10 PROCEDURE — 97112 NEUROMUSCULAR REEDUCATION: CPT

## 2017-08-10 PROCEDURE — 97110 THERAPEUTIC EXERCISES: CPT

## 2017-08-14 ENCOUNTER — HOSPITAL ENCOUNTER (OUTPATIENT)
Dept: PHYSICAL THERAPY | Facility: REHABILITATION | Age: 82
End: 2017-08-14
Attending: PAIN MEDICINE
Payer: MEDICARE

## 2017-08-14 PROCEDURE — 97110 THERAPEUTIC EXERCISES: CPT

## 2017-08-17 ENCOUNTER — HOSPITAL ENCOUNTER (OUTPATIENT)
Dept: PHYSICAL THERAPY | Facility: REHABILITATION | Age: 82
End: 2017-08-17
Attending: PAIN MEDICINE
Payer: MEDICARE

## 2017-08-17 PROCEDURE — 97140 MANUAL THERAPY 1/> REGIONS: CPT

## 2017-08-17 PROCEDURE — 97110 THERAPEUTIC EXERCISES: CPT

## 2017-08-21 ENCOUNTER — HOSPITAL ENCOUNTER (OUTPATIENT)
Dept: PHYSICAL THERAPY | Facility: REHABILITATION | Age: 82
End: 2017-08-21
Attending: PAIN MEDICINE
Payer: MEDICARE

## 2017-08-21 PROCEDURE — 97110 THERAPEUTIC EXERCISES: CPT

## 2017-08-24 ENCOUNTER — HOSPITAL ENCOUNTER (OUTPATIENT)
Dept: PHYSICAL THERAPY | Facility: REHABILITATION | Age: 82
End: 2017-08-24
Attending: PAIN MEDICINE
Payer: MEDICARE

## 2017-08-24 PROCEDURE — 97110 THERAPEUTIC EXERCISES: CPT

## 2017-08-24 PROCEDURE — G8980 MOBILITY D/C STATUS: HCPCS | Mod: CK

## 2017-08-24 PROCEDURE — G8979 MOBILITY GOAL STATUS: HCPCS | Mod: CJ

## 2017-08-28 ENCOUNTER — APPOINTMENT (OUTPATIENT)
Dept: PHYSICAL THERAPY | Facility: REHABILITATION | Age: 82
End: 2017-08-28
Attending: PAIN MEDICINE
Payer: MEDICARE

## 2017-08-31 ENCOUNTER — OFFICE VISIT (OUTPATIENT)
Dept: INTERNAL MEDICINE | Facility: MEDICAL CENTER | Age: 82
End: 2017-08-31
Payer: MEDICARE

## 2017-08-31 ENCOUNTER — APPOINTMENT (OUTPATIENT)
Dept: PHYSICAL THERAPY | Facility: REHABILITATION | Age: 82
End: 2017-08-31
Attending: PAIN MEDICINE
Payer: MEDICARE

## 2017-08-31 VITALS
SYSTOLIC BLOOD PRESSURE: 120 MMHG | DIASTOLIC BLOOD PRESSURE: 80 MMHG | HEART RATE: 80 BPM | WEIGHT: 118.8 LBS | TEMPERATURE: 97.5 F | BODY MASS INDEX: 24.83 KG/M2 | OXYGEN SATURATION: 95 %

## 2017-08-31 DIAGNOSIS — M85.88 OSTEOPENIA OF OTHER SITE: ICD-10-CM

## 2017-08-31 DIAGNOSIS — E55.9 VITAMIN D DEFICIENCY: ICD-10-CM

## 2017-08-31 DIAGNOSIS — K21.9 GASTROESOPHAGEAL REFLUX DISEASE WITHOUT ESOPHAGITIS: ICD-10-CM

## 2017-08-31 DIAGNOSIS — M54.2 CHRONIC NECK PAIN: ICD-10-CM

## 2017-08-31 DIAGNOSIS — I10 ESSENTIAL HYPERTENSION, BENIGN: ICD-10-CM

## 2017-08-31 DIAGNOSIS — M19.019 PRIMARY OSTEOARTHRITIS OF SHOULDER, UNSPECIFIED LATERALITY: ICD-10-CM

## 2017-08-31 DIAGNOSIS — G89.29 CHRONIC NECK PAIN: ICD-10-CM

## 2017-08-31 PROBLEM — R07.89 CHEST DISCOMFORT: Status: RESOLVED | Noted: 2017-04-13 | Resolved: 2017-08-31

## 2017-08-31 PROBLEM — R42 DIZZINESS: Status: RESOLVED | Noted: 2017-04-13 | Resolved: 2017-08-31

## 2017-08-31 PROCEDURE — 99214 OFFICE O/P EST MOD 30 MIN: CPT | Performed by: INTERNAL MEDICINE

## 2017-08-31 RX ORDER — LIDOCAINE 4 G/G
1 PATCH TOPICAL SEE ADMIN INSTRUCTIONS
Qty: 12 PATCH | Refills: 3 | Status: SHIPPED | OUTPATIENT
Start: 2017-08-31 | End: 2018-08-16

## 2017-09-15 ENCOUNTER — TELEPHONE (OUTPATIENT)
Dept: INTERNAL MEDICINE | Facility: MEDICAL CENTER | Age: 82
End: 2017-09-15

## 2017-09-15 NOTE — TELEPHONE ENCOUNTER
1. Caller Name: Pt                      Call Back Number: [phone] (home)     2. Message: Patient called and left a message requesting a call back.     I called patient back to let ask what we can assist her with. Patient called Dr. Bloch as asked by Dr. Iqbal to see what she can do for her low blood pressure. Dr. Bloch's office let her know that Dr. Bloch wasn't in office and she should direct her self to her pcp. Tessy is concerned now about her blood pressure getting back in the high end again. She believes that it is because she ate a little too many salty crackers and now that is raising her b/p. She is asking what to do for now? Should she wait until her 10/03 appt or make a sooner appt? I let her know I would relay the message over to Dr. Iqbal but if she sees that during the weekend it goes too high and not feeling well to go to an urgent care.    3. Patient approves office to leave a detailed voicemail/MyChart message: yes

## 2017-09-18 NOTE — TELEPHONE ENCOUNTER
Called patient and asked her what her weekend readings were. Tessy states that Saturday her B/P /82 and /79. Sunday's readings /77 /70. She states that she started noticing changes in her BP after the eclipse happened. Her B/P readings were really low at that time 80s over 60s then at the end of August her readings were going up again to high readings. Tessy states that she didn't eat a whole box of the crackers but just a few and she really thinks that is the cause of her B/P not going down now.

## 2017-09-18 NOTE — TELEPHONE ENCOUNTER
The effects of the salty crackers should be short lived. Please call her and get her BPs from the weekend

## 2017-09-28 ENCOUNTER — HOSPITAL ENCOUNTER (OUTPATIENT)
Dept: LAB | Facility: MEDICAL CENTER | Age: 82
End: 2017-09-28
Attending: INTERNAL MEDICINE
Payer: MEDICARE

## 2017-09-28 DIAGNOSIS — M19.019 PRIMARY OSTEOARTHRITIS OF SHOULDER, UNSPECIFIED LATERALITY: ICD-10-CM

## 2017-09-28 DIAGNOSIS — G89.29 CHRONIC NECK PAIN: ICD-10-CM

## 2017-09-28 DIAGNOSIS — I10 ESSENTIAL HYPERTENSION, BENIGN: ICD-10-CM

## 2017-09-28 DIAGNOSIS — E55.9 VITAMIN D DEFICIENCY: ICD-10-CM

## 2017-09-28 DIAGNOSIS — M54.2 CHRONIC NECK PAIN: ICD-10-CM

## 2017-09-28 LAB
25(OH)D3 SERPL-MCNC: 38 NG/ML (ref 30–100)
ALBUMIN SERPL BCP-MCNC: 3.8 G/DL (ref 3.2–4.9)
ALBUMIN/GLOB SERPL: 1.4 G/DL
ALP SERPL-CCNC: 78 U/L (ref 30–99)
ALT SERPL-CCNC: 11 U/L (ref 2–50)
ANION GAP SERPL CALC-SCNC: 8 MMOL/L (ref 0–11.9)
AST SERPL-CCNC: 18 U/L (ref 12–45)
BASOPHILS # BLD AUTO: 0.5 % (ref 0–1.8)
BASOPHILS # BLD: 0.03 K/UL (ref 0–0.12)
BILIRUB SERPL-MCNC: 0.4 MG/DL (ref 0.1–1.5)
BUN SERPL-MCNC: 19 MG/DL (ref 8–22)
CALCIUM SERPL-MCNC: 9.6 MG/DL (ref 8.5–10.5)
CHLORIDE SERPL-SCNC: 107 MMOL/L (ref 96–112)
CO2 SERPL-SCNC: 24 MMOL/L (ref 20–33)
CREAT SERPL-MCNC: 0.72 MG/DL (ref 0.5–1.4)
EOSINOPHIL # BLD AUTO: 0.15 K/UL (ref 0–0.51)
EOSINOPHIL NFR BLD: 2.4 % (ref 0–6.9)
ERYTHROCYTE [DISTWIDTH] IN BLOOD BY AUTOMATED COUNT: 50.4 FL (ref 35.9–50)
ERYTHROCYTE [SEDIMENTATION RATE] IN BLOOD BY WESTERGREN METHOD: 14 MM/HOUR (ref 0–30)
GFR SERPL CREATININE-BSD FRML MDRD: >60 ML/MIN/1.73 M 2
GLOBULIN SER CALC-MCNC: 2.8 G/DL (ref 1.9–3.5)
GLUCOSE SERPL-MCNC: 80 MG/DL (ref 65–99)
HCT VFR BLD AUTO: 36.4 % (ref 37–47)
HGB BLD-MCNC: 11.3 G/DL (ref 12–16)
IMM GRANULOCYTES # BLD AUTO: 0.04 K/UL (ref 0–0.11)
IMM GRANULOCYTES NFR BLD AUTO: 0.6 % (ref 0–0.9)
LYMPHOCYTES # BLD AUTO: 1.36 K/UL (ref 1–4.8)
LYMPHOCYTES NFR BLD: 21.6 % (ref 22–41)
MCH RBC QN AUTO: 30.4 PG (ref 27–33)
MCHC RBC AUTO-ENTMCNC: 31 G/DL (ref 33.6–35)
MCV RBC AUTO: 97.8 FL (ref 81.4–97.8)
MONOCYTES # BLD AUTO: 0.71 K/UL (ref 0–0.85)
MONOCYTES NFR BLD AUTO: 11.3 % (ref 0–13.4)
NEUTROPHILS # BLD AUTO: 4 K/UL (ref 2–7.15)
NEUTROPHILS NFR BLD: 63.6 % (ref 44–72)
NRBC # BLD AUTO: 0 K/UL
NRBC BLD AUTO-RTO: 0 /100 WBC
PLATELET # BLD AUTO: 135 K/UL (ref 164–446)
PMV BLD AUTO: 10.5 FL (ref 9–12.9)
POTASSIUM SERPL-SCNC: 3.7 MMOL/L (ref 3.6–5.5)
PROT SERPL-MCNC: 6.6 G/DL (ref 6–8.2)
RBC # BLD AUTO: 3.72 M/UL (ref 4.2–5.4)
SODIUM SERPL-SCNC: 139 MMOL/L (ref 135–145)
TSH SERPL DL<=0.005 MIU/L-ACNC: 3.39 UIU/ML (ref 0.3–3.7)
WBC # BLD AUTO: 6.3 K/UL (ref 4.8–10.8)

## 2017-09-28 PROCEDURE — 80053 COMPREHEN METABOLIC PANEL: CPT

## 2017-09-28 PROCEDURE — 85652 RBC SED RATE AUTOMATED: CPT

## 2017-09-28 PROCEDURE — 85025 COMPLETE CBC W/AUTO DIFF WBC: CPT

## 2017-09-28 PROCEDURE — 86038 ANTINUCLEAR ANTIBODIES: CPT

## 2017-09-28 PROCEDURE — 84443 ASSAY THYROID STIM HORMONE: CPT

## 2017-09-28 PROCEDURE — 36415 COLL VENOUS BLD VENIPUNCTURE: CPT

## 2017-09-28 PROCEDURE — 82306 VITAMIN D 25 HYDROXY: CPT

## 2017-09-28 PROCEDURE — 86200 CCP ANTIBODY: CPT

## 2017-09-28 PROCEDURE — 86225 DNA ANTIBODY NATIVE: CPT

## 2017-09-28 PROCEDURE — 86235 NUCLEAR ANTIGEN ANTIBODY: CPT | Mod: 91

## 2017-09-30 LAB
CCP IGG SERPL-ACNC: 3 UNITS (ref 0–19)
NUCLEAR IGG SER QL IA: NORMAL

## 2017-10-10 ENCOUNTER — OFFICE VISIT (OUTPATIENT)
Dept: INTERNAL MEDICINE | Facility: MEDICAL CENTER | Age: 82
End: 2017-10-10
Payer: MEDICARE

## 2017-10-10 VITALS
TEMPERATURE: 98.6 F | DIASTOLIC BLOOD PRESSURE: 66 MMHG | WEIGHT: 121.6 LBS | SYSTOLIC BLOOD PRESSURE: 130 MMHG | OXYGEN SATURATION: 95 % | BODY MASS INDEX: 25.41 KG/M2 | HEART RATE: 74 BPM

## 2017-10-10 DIAGNOSIS — R25.2 LEG CRAMPS: ICD-10-CM

## 2017-10-10 DIAGNOSIS — I10 ESSENTIAL HYPERTENSION: ICD-10-CM

## 2017-10-10 DIAGNOSIS — D61.818 PANCYTOPENIA (HCC): ICD-10-CM

## 2017-10-10 PROCEDURE — 99214 OFFICE O/P EST MOD 30 MIN: CPT | Performed by: INTERNAL MEDICINE

## 2017-10-10 NOTE — PROGRESS NOTES
Established Patient    Ms. Lund a 87 y.o. female who presents today with:  CC: Follow-Up (labs, right leg, and B/P)        Assessment and Plan    1. Leg cramps  Suspect that her leg symptoms may be neuropathy secondary to radiculopathy. We discussed the pros and cons of an EMG and she would like to proceed with EMG.    2. Essential hypertension  Reviewed her blood pressures. Systolic blood pressure is 140 through 150. She does have an occasional 60 or 70 systolic blood pressure which I suspect is false. She will bring her blood pressure machine in with her next visit. Continue current regimen.    3. Pancytopenia (CMS-[facility])  Suspect that this is lab error. Will repeat value.      Current Outpatient Prescriptions   Medication Sig Dispense Refill   • aspirin 81 MG tablet Take 81 mg by mouth every day.     • pantoprazole (PROTONIX) 40 MG Tablet Delayed Response TAKE 1 TABLET BY MOUTH ONCE DAILY 30 Tab 4   • ranitidine (ZANTAC) 150 MG Tab TAKE 1 TABLET BY MOUTH ONCE DAILY 30 Tab 5   • vitamin D (CHOLECALCIFEROL) 1000 UNIT Tab Take 1,000 Units by mouth every day.     • acetaminophen (TYLENOL) 325 MG Tab Take 650 mg by mouth every four hours as needed.     • Multiple Vitamins-Minerals (PRESERVISION AREDS 2) Cap Take  by mouth.     • spironolactone (ALDACTONE) 25 MG Tab Half tablet daily (Patient taking differently: Take 50 mg by mouth every day. Half tablet daily  Indications: 1/2 with food daily) 1 Tab 0   • valsartan (DIOVAN) 320 MG tablet Take 1 Tab by mouth every day. 30 Tab 3   • doxazosin (CARDURA) 2 MG Tab 2 pills at night (Patient taking differently: Take 2 mg by mouth 2 Times a Day. 2 pills at night) 30 Tab 0   • Polyethylene Glycol 3350 (MIRALAX PO) Take  by mouth.     • Sennosides (SENNA LAX PO) Take 8.6 mg by mouth. Indications: 2 mon, wed, fri     • docusate sodium (COLACE) 100 MG Cap Take 100 mg by mouth 2 times a day.     • Lidocaine 4 % Patch 1 Patch by Apply externally route See Admin Instructions. Apply  for 12hrs on and off for 12hrs 12 Patch 3   • lorazepam (ATIVAN) 0.5 MG Tab Take 0.5 mg by mouth every four hours as needed for Anxiety.     • aspirin (ASA) 81 MG CHEW chewable tablet Take 81 mg by mouth every day.     • simethicone (GAS-X) 80 MG CHEW Take 80 mg by mouth every 6 hours as needed.       No current facility-administered medications for this visit.          followup No Follow-up on file.    This note was created using voice recognition software (Dragon). The accuracy of the dictation is limited by the abilities of the software. I have reviewed the note prior to signing, however some errors in grammar and context are still possible. If you have any questions related to this note please do not hesitate to contact our office.   _______________________________________________________    HPI:   1. Leg cramps  Dull ache along right shin for several months. Has cramping of both thighs, right is worse. Has bilateral knee pain chronically. Cramping wakes her up middle of the night. Has difficulty with proprioception of right leg. Compression socks don't help much. Hot and cold prickly sensation in right shin. Tingling sensations bilateral toes. Subjective weakness of the right leg. No falls or foot drop. Has chronic LE edema but resolves when she wears compression stockings. Occasional right low back pain with radiation down the buttocks to the leg.    2. Essential hypertension  She brought her blood pressure recordings. Appears that systolics in the 130-140. High values of 166-170. She had 2 systolic blood pressures in the 60s, but she states she was asymptomatic during that time.    3. Pancytopenia (CMS-HCC)  Last CBC revealed slightly low numbers for all 3 cell lines. Prior CBC within normal limits. No symptoms     has a past medical history of Chronic venous insufficiency (5/26/2016); Hypertension (5/26/2016); LLQ abdominal mass (5/26/2016); Palpitations (5/26/2016); and WILLIAN (renal artery stenosis) (CMS-HCC)  (5/26/2016).     reports that she has never smoked. She has never used smokeless tobacco. She reports that she does not drink alcohol or use drugs.      ROS: Pertinent positives as stated in HPI, all others reviewed as negative:  Rib pain, shoulder pain and neck pain. Neck brace works with that. She stopped going to the pain clinic.  PT has not helped much. No longer doing the HEPs because results are not that effective. Occasionally will now have pain in the anterior chest with leaning forward only      Physical Exam  /66   Pulse 74   Temp 37 °C (98.6 °F)   Wt 55.2 kg (121 lb 9.6 oz)   SpO2 95%   BMI 25.41 kg/m²   Constitutional:  oriented to person, place, and time. No distress.   Musculoskeletal:   no edema. No atrophy, no weakness, no pain, no fasciculations. Full range of motion at the ankle and knee on the right. Full range of motion at the hip. No ulcerations. DTRs lower extremity 2+ and symmetric, strength 5 out of 5          Current Outpatient Prescriptions on File Prior to Visit   Medication Sig Dispense Refill   • pantoprazole (PROTONIX) 40 MG Tablet Delayed Response TAKE 1 TABLET BY MOUTH ONCE DAILY 30 Tab 4   • ranitidine (ZANTAC) 150 MG Tab TAKE 1 TABLET BY MOUTH ONCE DAILY 30 Tab 5   • vitamin D (CHOLECALCIFEROL) 1000 UNIT Tab Take 1,000 Units by mouth every day.     • acetaminophen (TYLENOL) 325 MG Tab Take 650 mg by mouth every four hours as needed.     • Multiple Vitamins-Minerals (PRESERVISION AREDS 2) Cap Take  by mouth.     • spironolactone (ALDACTONE) 25 MG Tab Half tablet daily (Patient taking differently: Take 50 mg by mouth every day. Half tablet daily  Indications: 1/2 with food daily) 1 Tab 0   • valsartan (DIOVAN) 320 MG tablet Take 1 Tab by mouth every day. 30 Tab 3   • doxazosin (CARDURA) 2 MG Tab 2 pills at night (Patient taking differently: Take 2 mg by mouth 2 Times a Day. 2 pills at night) 30 Tab 0   • Polyethylene Glycol 3350 (MIRALAX PO) Take  by mouth.     • Sennosides  (SENNA LAX PO) Take 8.6 mg by mouth. Indications: 2 mon, wed, fri     • docusate sodium (COLACE) 100 MG Cap Take 100 mg by mouth 2 times a day.     • Lidocaine 4 % Patch 1 Patch by Apply externally route See Admin Instructions. Apply for 12hrs on and off for 12hrs 12 Patch 3   • lorazepam (ATIVAN) 0.5 MG Tab Take 0.5 mg by mouth every four hours as needed for Anxiety.     • aspirin (ASA) 81 MG CHEW chewable tablet Take 81 mg by mouth every day.     • simethicone (GAS-X) 80 MG CHEW Take 80 mg by mouth every 6 hours as needed.       No current facility-administered medications on file prior to visit.            Signed by: Jaswinder Iqbal M.D.

## 2017-10-17 ENCOUNTER — NON-PROVIDER VISIT (OUTPATIENT)
Dept: NEUROLOGY | Facility: MEDICAL CENTER | Age: 82
End: 2017-10-17
Payer: MEDICARE

## 2017-10-17 ENCOUNTER — TELEPHONE (OUTPATIENT)
Dept: INTERNAL MEDICINE | Facility: MEDICAL CENTER | Age: 82
End: 2017-10-17

## 2017-10-17 ENCOUNTER — HOSPITAL ENCOUNTER (OUTPATIENT)
Dept: LAB | Facility: MEDICAL CENTER | Age: 82
End: 2017-10-17
Attending: INTERNAL MEDICINE
Payer: MEDICARE

## 2017-10-17 DIAGNOSIS — D72.819 LEUKOPENIA, UNSPECIFIED TYPE: ICD-10-CM

## 2017-10-17 DIAGNOSIS — R25.2 LEG CRAMPS: ICD-10-CM

## 2017-10-17 DIAGNOSIS — R20.0 RIGHT LEG NUMBNESS: ICD-10-CM

## 2017-10-17 LAB
BASOPHILS # BLD AUTO: 0.4 % (ref 0–1.8)
BASOPHILS # BLD: 0.03 K/UL (ref 0–0.12)
EOSINOPHIL # BLD AUTO: 0.23 K/UL (ref 0–0.51)
EOSINOPHIL NFR BLD: 3.1 % (ref 0–6.9)
ERYTHROCYTE [DISTWIDTH] IN BLOOD BY AUTOMATED COUNT: 47.1 FL (ref 35.9–50)
HCT VFR BLD AUTO: 36.3 % (ref 37–47)
HGB BLD-MCNC: 11.7 G/DL (ref 12–16)
IMM GRANULOCYTES # BLD AUTO: 0.05 K/UL (ref 0–0.11)
IMM GRANULOCYTES NFR BLD AUTO: 0.7 % (ref 0–0.9)
LYMPHOCYTES # BLD AUTO: 1.84 K/UL (ref 1–4.8)
LYMPHOCYTES NFR BLD: 24.9 % (ref 22–41)
MCH RBC QN AUTO: 31.3 PG (ref 27–33)
MCHC RBC AUTO-ENTMCNC: 32.2 G/DL (ref 33.6–35)
MCV RBC AUTO: 97.1 FL (ref 81.4–97.8)
MONOCYTES # BLD AUTO: 0.73 K/UL (ref 0–0.85)
MONOCYTES NFR BLD AUTO: 9.9 % (ref 0–13.4)
NEUTROPHILS # BLD AUTO: 4.52 K/UL (ref 2–7.15)
NEUTROPHILS NFR BLD: 61 % (ref 44–72)
NRBC # BLD AUTO: 0 K/UL
NRBC BLD AUTO-RTO: 0 /100 WBC
PLATELET # BLD AUTO: 170 K/UL (ref 164–446)
PMV BLD AUTO: 10.6 FL (ref 9–12.9)
RBC # BLD AUTO: 3.74 M/UL (ref 4.2–5.4)
WBC # BLD AUTO: 7.4 K/UL (ref 4.8–10.8)

## 2017-10-17 PROCEDURE — 36415 COLL VENOUS BLD VENIPUNCTURE: CPT

## 2017-10-17 PROCEDURE — 95908 NRV CNDJ TST 3-4 STUDIES: CPT | Performed by: SPECIALIST

## 2017-10-17 PROCEDURE — 95886 MUSC TEST DONE W/N TEST COMP: CPT | Performed by: SPECIALIST

## 2017-10-17 PROCEDURE — 85025 COMPLETE CBC W/AUTO DIFF WBC: CPT

## 2017-10-17 NOTE — PROCEDURES
DATE OF SERVICE:  10/17/2017    ORDERING PHYSICIAN:  Jaswinder Iqbal MD    DATE OF STUDY:  10/17/2017    SUMMARY:  Nerve conduction studies of the right lower extremity revealed the   followin.  Right tibial motor distal latency, amplitude, and conduction velocity are   within normal limits.  2.  Right peroneal motor distal latency, amplitude, and conduction velocity   are within normal limits.    Needle examination of selected muscles studied in the right lower extremity   reveals no acute or chronic denervation changes.  Muscles studied were right   vastus lateralis, tibialis anterior, gastrocnemius, extensor digitorum brevis,   and abductor hallucis.    Nerve conduction studies of the left lower extremity revealed the followin.  Left tibial motor distal latency, amplitude, and conduction velocity are   within normal limits.  2.  Left peroneal motor distal latency, amplitude, and conduction velocity are   within normal limits.    Needle examination of selected muscles studied in the left lower extremity   reveals no evidence of acute or chronic denervation changes.  Muscles studied   were left vastus lateralis, tibialis anterior, gastrocnemius, extensor   digitorum brevis, and abductor hallucis.    Patient: JEWELL RON : 1930 Physician: AKIRA   Sex: Female Height:  cm Ref Phys: ALEXANDER   ID#: [id number] Weight:  lbs. Technician: AKIRA   CSN#: [id number]           Nerve Conduction Studies     Stim Site NR Onset (ms) Norm Onset (ms) O-P Amp (mV) Norm O-P Amp Site1 Site2 Delta-0 (ms) Dist (cm) Cem (m/s) Norm Cem (m/s)   Left Peroneal EDB Motor (Ext Dig Brev)  25.1°C   Ankle    2.1 <6 3.9 >2.5 B Fib Ankle 5.9 35.0 59 >40   B Fib    8.0  1.4          Right Peroneal EDB Motor (Ext Dig Brev)  24.9°C   Ankle    2.7 <6 4.7 >2.5 B Fib Ankle 6.3 34.0 54 >40   B Fib    9.0  4.1          Left Tibial Motor (Abd Jaramillo Brev)  25.1°C   Ankle    3.5 <6 *3.9 >4 Knee Ankle 7.5 32.0 43 >40   Knee    11.0  3.2           Right Tibial Motor (Abd Jaramillo Brev)  24.7°C   Ankle    3.6 <6 5.1 >4 Knee Ankle 9.3 32.0 *34 >40   Knee    12.9  0.0                        Electromyography     Side Muscle Nerve Root Ins Act Fibs Psw Amp Dur Poly Recrt Int Pat Comment   Right VastusLat Femoral L2-4 Nml Nml Nml Nml Nml 0 Nml Nml    Right AntTibialis Dp Br Fibular L4-5 Nml Nml Nml Nml Nml 0 Nml Nml    Right Gastroc Tibial S1-2 Nml Nml Nml Nml Nml 0 Nml Nml    Right Ext Dig Brev Dp Br Fibular L5, S1 Nml Nml Nml Nml Nml 0 Nml Nml    Right AbdHallucis MedPlantar S1-2 Nml Nml Nml Nml Nml 0 Nml Nml    Left VastusLat Femoral L2-4 Nml Nml Nml Nml Nml 0 Nml Nml    Left AntTibialis Dp Br Fibular L4-5 Nml Nml Nml Nml Nml 0 Nml Nml    Left Gastroc Tibial S1-2 Nml Nml Nml Nml Nml 0 Nml Nml    Left Ext Dig Brev Dp Br Fibular L5, S1 Nml Nml Nml Nml Nml 0 Nml Nml    Left AbdHallucis MedPlantar S1-2 Nml Nml Nml Nml Nml 0 Nml Nml        IMPRESSION:  Normal EMG and nerve conduction studies, bilateral lower   extremities.  Clinical correlation is suggested.       ____________________________________     G MD JULES KERN / YO    DD:  10/17/2017 13:35:44  DT:  10/17/2017 14:06:22    D#:  [id number]  Job#:  [id number]    cc: LIEN MUNOZ MD

## 2017-10-17 NOTE — TELEPHONE ENCOUNTER
1. Caller Name: [facility]                      Call Back Number: [id number]    2. Message: [facility] called stating they received referral for patient but was placed wrong. Normally referral for EMG is placed to neurology, so we would need to put a new one.    3. Patient approves office to leave a detailed voicemail/MyChart message: N\A

## 2017-10-23 ENCOUNTER — HOSPITAL ENCOUNTER (OUTPATIENT)
Dept: RADIOLOGY | Facility: MEDICAL CENTER | Age: 82
End: 2017-10-23
Attending: INTERNAL MEDICINE
Payer: MEDICARE

## 2017-10-23 DIAGNOSIS — Z12.31 SCREENING MAMMOGRAM, ENCOUNTER FOR: ICD-10-CM

## 2017-10-23 PROCEDURE — G0202 SCR MAMMO BI INCL CAD: HCPCS

## 2017-11-08 ENCOUNTER — OFFICE VISIT (OUTPATIENT)
Dept: INTERNAL MEDICINE | Facility: MEDICAL CENTER | Age: 82
End: 2017-11-08
Payer: MEDICARE

## 2017-11-08 VITALS
HEART RATE: 86 BPM | TEMPERATURE: 98.1 F | OXYGEN SATURATION: 96 % | DIASTOLIC BLOOD PRESSURE: 100 MMHG | WEIGHT: 123.6 LBS | BODY MASS INDEX: 25.83 KG/M2 | SYSTOLIC BLOOD PRESSURE: 160 MMHG

## 2017-11-08 DIAGNOSIS — R25.2 LEG CRAMPS: ICD-10-CM

## 2017-11-08 DIAGNOSIS — I10 ESSENTIAL HYPERTENSION, BENIGN: ICD-10-CM

## 2017-11-08 PROCEDURE — 99214 OFFICE O/P EST MOD 30 MIN: CPT | Performed by: INTERNAL MEDICINE

## 2017-11-09 NOTE — TELEPHONE ENCOUNTER
Last seen: 11/08/17 by Dr. Iqbal  Next appt: 01/11/18 with Dr. Iqbal    Was the patient seen in the last year in this department? Yes   Does patient have an active prescription for medications requested? No   Received Request Via: Pharmacy

## 2017-11-09 NOTE — PROGRESS NOTES
Established Patient    Ms. Lund a 87 y.o. female who presents today with:  CC: Follow-Up (tests) and Medication Management        Assessment and Plan    1. Leg cramps  Possibly secondary to hypomagnesemia. I will check her magnesium levels. Currently her cramps are not symptomatic.    2. Essential hypertension, benign  I reviewed her blood pressure recordings with her today. She does have elevated systolic values occasionally. She is seeing Dr. Bloch for this. She has no associated symptoms. For now we'll continue same regimen  Current Outpatient Prescriptions   Medication Sig Dispense Refill   • aspirin 81 MG tablet Take 81 mg by mouth every day.     • Lidocaine 4 % Patch 1 Patch by Apply externally route See Admin Instructions. Apply for 12hrs on and off for 12hrs 12 Patch 3   • pantoprazole (PROTONIX) 40 MG Tablet Delayed Response TAKE 1 TABLET BY MOUTH ONCE DAILY 30 Tab 4   • ranitidine (ZANTAC) 150 MG Tab TAKE 1 TABLET BY MOUTH ONCE DAILY 30 Tab 5   • vitamin D (CHOLECALCIFEROL) 1000 UNIT Tab Take 1,000 Units by mouth every day.     • acetaminophen (TYLENOL) 325 MG Tab Take 650 mg by mouth every four hours as needed.     • Multiple Vitamins-Minerals (PRESERVISION AREDS 2) Cap Take  by mouth.     • spironolactone (ALDACTONE) 25 MG Tab Half tablet daily (Patient taking differently: Take 50 mg by mouth every day. Half tablet daily  Indications: 1/2 with food daily) 1 Tab 0   • valsartan (DIOVAN) 320 MG tablet Take 1 Tab by mouth every day. 30 Tab 3   • doxazosin (CARDURA) 2 MG Tab 2 pills at night (Patient taking differently: Take 2 mg by mouth 2 Times a Day. 2 pills at night) 30 Tab 0   • Polyethylene Glycol 3350 (MIRALAX PO) Take  by mouth.     • lorazepam (ATIVAN) 0.5 MG Tab Take 0.5 mg by mouth every four hours as needed for Anxiety.     • Sennosides (SENNA LAX PO) Take 8.6 mg by mouth. Indications: 2 mon, wed, fri     • docusate sodium (COLACE) 100 MG Cap Take 100 mg by mouth 2 times a day.     • aspirin  (ASA) 81 MG CHEW chewable tablet Take 81 mg by mouth every day.     • simethicone (GAS-X) 80 MG CHEW Take 80 mg by mouth every 6 hours as needed.       No current facility-administered medications for this visit.          followup No Follow-up on file.    This note was created using voice recognition software (Dragon). The accuracy of the dictation is limited by the abilities of the software. I have reviewed the note prior to signing, however some errors in grammar and context are still possible. If you have any questions related to this note please do not hesitate to contact our office.   _______________________________________________________    HPI:   Today she had symptoms of 'heart racing'. At the time, she received a letter that she no longer had Medicare. Very concerned. She also had GERD- mid sternal and vertical in nature and burning up to the throat. Symptoms resolved over time. Tums helped these symptoms significantly      1. Leg cramps  Previously complained of paraesthesia of the lower extremities. R>>L symptoms. Cramps are better than the last visit.   Recent EMG normal of the lower extremities. No swelling significant. No rash or bleeding. Symptoms mostly at night. Daily use of PPI    2. Essential hypertension, benign  Patient brought in blood pressures recordings but not machine. At the last visit she reported systolic of 60-70 on her machine at home. In review of her values her highest value is 201 and lowest systolic is 93. Average range is 130-160. Complaint with her medications including spirolactone valsartan.       has a past medical history of Chronic venous insufficiency (5/26/2016); Hypertension (5/26/2016); LLQ abdominal mass (5/26/2016); Palpitations (5/26/2016); and WILLIAN (renal artery stenosis) (CMS-HCC) (5/26/2016).     reports that she has never smoked. She has never used smokeless tobacco. She reports that she does not drink alcohol or use drugs.      ROS: Pertinent positives as stated  in HPI, all others reviewed as negative:  Neurologic ROS: slight memory loss but not to concerned about it.       Physical Exam  /100   Pulse 86   Temp 36.7 °C (98.1 °F)   Wt 56.1 kg (123 lb 9.6 oz)   SpO2 96%   BMI 25.83 kg/m²   Constitutional:  oriented to person, place, and time. No distress.   Eyes: Pupils are equal, round, and reactive to light. No scleral icterus.   Neck: Neck supple. No thyromegaly present.   Cardiovascular: Normal rate, regular rhythm and normal heart sounds.  Exam reveals no gallop and no friction rub.    No murmur heard.  Pulmonary/Chest: Breath sounds normal. Chest wall is not dull to percussion.   Musculoskeletal:   no edema. No ulcerations, rashes  Lymphadenopathy: no cervical adenopathy  Neurological: alert and oriented to person, place, and time.   Skin: No cyanosis. Nails show no clubbing.          Current Outpatient Prescriptions on File Prior to Visit   Medication Sig Dispense Refill   • aspirin 81 MG tablet Take 81 mg by mouth every day.     • Lidocaine 4 % Patch 1 Patch by Apply externally route See Admin Instructions. Apply for 12hrs on and off for 12hrs 12 Patch 3   • pantoprazole (PROTONIX) 40 MG Tablet Delayed Response TAKE 1 TABLET BY MOUTH ONCE DAILY 30 Tab 4   • ranitidine (ZANTAC) 150 MG Tab TAKE 1 TABLET BY MOUTH ONCE DAILY 30 Tab 5   • vitamin D (CHOLECALCIFEROL) 1000 UNIT Tab Take 1,000 Units by mouth every day.     • acetaminophen (TYLENOL) 325 MG Tab Take 650 mg by mouth every four hours as needed.     • Multiple Vitamins-Minerals (PRESERVISION AREDS 2) Cap Take  by mouth.     • spironolactone (ALDACTONE) 25 MG Tab Half tablet daily (Patient taking differently: Take 50 mg by mouth every day. Half tablet daily  Indications: 1/2 with food daily) 1 Tab 0   • valsartan (DIOVAN) 320 MG tablet Take 1 Tab by mouth every day. 30 Tab 3   • doxazosin (CARDURA) 2 MG Tab 2 pills at night (Patient taking differently: Take 2 mg by mouth 2 Times a Day. 2 pills at night)  30 Tab 0   • Polyethylene Glycol 3350 (MIRALAX PO) Take  by mouth.     • lorazepam (ATIVAN) 0.5 MG Tab Take 0.5 mg by mouth every four hours as needed for Anxiety.     • Sennosides (SENNA LAX PO) Take 8.6 mg by mouth. Indications: 2 mon, wed, fri     • docusate sodium (COLACE) 100 MG Cap Take 100 mg by mouth 2 times a day.     • aspirin (ASA) 81 MG CHEW chewable tablet Take 81 mg by mouth every day.     • simethicone (GAS-X) 80 MG CHEW Take 80 mg by mouth every 6 hours as needed.       No current facility-administered medications on file prior to visit.            Signed by: Jaswinder Iqbal M.D.

## 2017-11-10 RX ORDER — PANTOPRAZOLE SODIUM 40 MG/1
TABLET, DELAYED RELEASE ORAL
Qty: 30 TAB | Refills: 2 | Status: SHIPPED | OUTPATIENT
Start: 2017-11-10 | End: 2018-05-11

## 2017-12-20 ENCOUNTER — HOSPITAL ENCOUNTER (OUTPATIENT)
Dept: LAB | Facility: MEDICAL CENTER | Age: 82
End: 2017-12-20
Attending: INTERNAL MEDICINE
Payer: MEDICARE

## 2017-12-20 DIAGNOSIS — R25.2 LEG CRAMPS: ICD-10-CM

## 2017-12-20 LAB — MAGNESIUM SERPL-MCNC: 1.7 MG/DL (ref 1.5–2.5)

## 2017-12-20 PROCEDURE — 36415 COLL VENOUS BLD VENIPUNCTURE: CPT

## 2017-12-20 PROCEDURE — 83735 ASSAY OF MAGNESIUM: CPT

## 2018-01-11 ENCOUNTER — OFFICE VISIT (OUTPATIENT)
Dept: INTERNAL MEDICINE | Facility: MEDICAL CENTER | Age: 83
End: 2018-01-11
Payer: MEDICARE

## 2018-01-11 VITALS
WEIGHT: 122.8 LBS | DIASTOLIC BLOOD PRESSURE: 80 MMHG | HEART RATE: 64 BPM | BODY MASS INDEX: 25.67 KG/M2 | TEMPERATURE: 97.8 F | OXYGEN SATURATION: 96 % | SYSTOLIC BLOOD PRESSURE: 118 MMHG

## 2018-01-11 DIAGNOSIS — K21.9 GASTROESOPHAGEAL REFLUX DISEASE WITHOUT ESOPHAGITIS: ICD-10-CM

## 2018-01-11 DIAGNOSIS — M79.89 LEG SWELLING: ICD-10-CM

## 2018-01-11 DIAGNOSIS — I10 ESSENTIAL HYPERTENSION: ICD-10-CM

## 2018-01-11 DIAGNOSIS — M85.88 OSTEOPENIA OF OTHER SITE: ICD-10-CM

## 2018-01-11 PROCEDURE — 99214 OFFICE O/P EST MOD 30 MIN: CPT | Performed by: INTERNAL MEDICINE

## 2018-01-11 RX ORDER — PANTOPRAZOLE SODIUM 20 MG/1
20 TABLET, DELAYED RELEASE ORAL 2 TIMES DAILY
Qty: 60 TAB | Refills: 0 | Status: SHIPPED | OUTPATIENT
Start: 2018-01-11 | End: 2018-02-06 | Stop reason: SDUPTHER

## 2018-01-11 RX ORDER — SPIRONOLACTONE 25 MG/1
25 TABLET ORAL DAILY
Qty: 90 TAB | Refills: 2 | Status: SHIPPED | OUTPATIENT
Start: 2018-01-11 | End: 2018-07-13 | Stop reason: SDUPTHER

## 2018-01-11 RX ORDER — RANITIDINE 150 MG/1
TABLET ORAL
Qty: 90 TAB | Refills: 2 | Status: SHIPPED | OUTPATIENT
Start: 2018-01-11 | End: 2018-09-18 | Stop reason: SDUPTHER

## 2018-01-11 NOTE — PROGRESS NOTES
Established Patient    Ms. Lund a 87 y.o. female who presents today with:  CC: Follow-Up (leg cramps) and Other (urine color)        Assessment and Plan    1. Essential hypertension  She has a blood pressure digital cuff that at home is significantly reading several points above manual readings. I suggested she purchase a new blood pressure cuff.  - spironolactone (ALDACTONE) 25 MG Tab; Take 1 Tab by mouth every day.  Dispense: 90 Tab; Refill: 2    2. Leg swelling  She has intermittent leg swelling that is most likely venous in nature however she is complaining of clear urine and thus I will check a urinalysis and creatinine. I will also check a BNP  - B TYPE NATRIURETIC  - CMP14+EGFR  - CBC WITH DIFFERENTIAL; Future  - URINALYSIS; Future    3. Gastroesophageal reflux disease without esophagitis  She continues to have symptoms of gastroesophageal reflux disease with PPI therapy. I suggest that she changed from 40 mg daily to 20 mg twice a day of Protonix.    4. Osteopenia of other site  We had another long discussion about the benefits of bisphosphonate therapy for people at risk of fracture. After discussing potential side effects she elects not to pursue bisphosphonate therapy or other therapies outside of vitamin D supplementation.      Current Outpatient Prescriptions   Medication Sig Dispense Refill   • pantoprazole (PROTONIX) 20 MG tablet Take 1 Tab by mouth 2 times a day. 60 Tab 0   • spironolactone (ALDACTONE) 25 MG Tab Take 1 Tab by mouth every day. 90 Tab 2   • pantoprazole (PROTONIX) 40 MG Tablet Delayed Response TAKE ONE TABLET BY MOUTH ONCE DAILY 30 Tab 2   • ranitidine (ZANTAC) 150 MG Tab TAKE 1 TABLET BY MOUTH ONCE DAILY 30 Tab 5   • vitamin D (CHOLECALCIFEROL) 1000 UNIT Tab Take 1,000 Units by mouth every day.     • acetaminophen (TYLENOL) 325 MG Tab Take 650 mg by mouth every four hours as needed.     • Multiple Vitamins-Minerals (PRESERVISION AREDS 2) Cap Take  by mouth.     • valsartan (DIOVAN)  320 MG tablet Take 1 Tab by mouth every day. 30 Tab 3   • doxazosin (CARDURA) 2 MG Tab 2 pills at night (Patient taking differently: Take 2 mg by mouth 2 Times a Day. 2 pills at night) 30 Tab 0   • Polyethylene Glycol 3350 (MIRALAX PO) Take  by mouth.     • aspirin (ASA) 81 MG CHEW chewable tablet Take 81 mg by mouth every day.     • simethicone (GAS-X) 80 MG CHEW Take 80 mg by mouth every 6 hours as needed.     • Lidocaine 4 % Patch 1 Patch by Apply externally route See Admin Instructions. Apply for 12hrs on and off for 12hrs 12 Patch 3   • lorazepam (ATIVAN) 0.5 MG Tab Take 0.5 mg by mouth every four hours as needed for Anxiety.     • Sennosides (SENNA LAX PO) Take 8.6 mg by mouth. Indications: 2 mon, wed, fri     • docusate sodium (COLACE) 100 MG Cap Take 100 mg by mouth 2 times a day.       No current facility-administered medications for this visit.          followup Return in about 4 weeks (around 2/8/2018) for Long.    This note was created using voice recognition software (Dragon). The accuracy of the dictation is limited by the abilities of the software. I have reviewed the note prior to signing, however some errors in grammar and context are still possible. If you have any questions related to this note please do not hesitate to contact our office.   _______________________________________________________    HPI:   1. Essential hypertension  She has a long-standing history of chronic hypertension and this is managed by Dr. Bloch. He did recommend that she purchase a new blood pressure machine for home use. She has a history of markedly elevated blood pressures from her home records that are inconsistent with blood pressures obtained in the office. She brought in blood pressures from October November and December and her systolic blood pressures range anywhere from 1:30 to 203 with diastolics from 60 through 100. In office today manual blood pressures are within acceptable limits and immediately thereafter  her blood pressure monitor measures 181/105 and 199/99. She denies any chest pain, headaches, syncope.    2. Leg swelling  She is describing some recent leg swelling that she's had before in the past. She denies any change in her urination although she does complain that her urine is clear and has not been yellow recently. She denies any flank pain or polyuria or polydipsia. She has no orthopnea, shortness of breath or chest pain. The legs are better in the morning and progressively increase in swelling as the day progresses.    3. Gastroesophageal reflux disease without esophagitis  She has a history gastroesophageal reflux disease she is prescribed Protonix 40 mg daily which she does take in the morning. She also occasionally takes ranitidine. She is describing a burning sour taste occasionally 3 or 4 times per week in the sternum. This is exacerbated by laying flat. She denies any dysphagia, odynophagia, hematemesis, nausea or vomiting, melena or abdominal pain.    4. Osteopenia of other site  She has a history of osteopenia with major fracture risk at 27% and hip fracture risk of almost 10%. She is compliant with vitamin D. She is frail. She does not exercise. Previously I suggested that she consider bisphosphonate therapy but after hearing about side effects she elected not to. She will like to discuss this again.       has a past medical history of Chronic venous insufficiency (5/26/2016); Hypertension (5/26/2016); LLQ abdominal mass (5/26/2016); Palpitations (5/26/2016); and WILLIAN (renal artery stenosis) ([facility]) (5/26/2016).     reports that she has never smoked. She has never used smokeless tobacco. She reports that she does not drink alcohol or use drugs.      ROS: Pertinent positives as stated in HPI, all others reviewed as negative:        Physical Exam  /80   Pulse 64   Temp 36.6 °C (97.8 °F)   Wt 55.7 kg (122 lb 12.8 oz)   SpO2 96%   BMI 25.67 kg/m²   Constitutional:  oriented to person,  place, and time. No distress.   Eyes: Pupils are equal, round, and reactive to light. No scleral icterus.   Neck: Neck supple. No thyromegaly present.   Cardiovascular: Normal rate, regular rhythm and normal heart sounds.  Exam reveals no gallop and no friction rub.    No murmur heard. No S3   Pulmonary/Chest: Breath sounds normal. Chest wall is not dull to percussion.   Musculoskeletal: 1+ pitting edema to the mid calves bilaterally.  Lymphadenopathy: no cervical adenopathy  Neurological: alert and oriented to person, place, and time.   Skin: No cyanosis. Nails show no clubbing.          Current Outpatient Prescriptions on File Prior to Visit   Medication Sig Dispense Refill   • pantoprazole (PROTONIX) 40 MG Tablet Delayed Response TAKE ONE TABLET BY MOUTH ONCE DAILY 30 Tab 2   • ranitidine (ZANTAC) 150 MG Tab TAKE 1 TABLET BY MOUTH ONCE DAILY 30 Tab 5   • vitamin D (CHOLECALCIFEROL) 1000 UNIT Tab Take 1,000 Units by mouth every day.     • acetaminophen (TYLENOL) 325 MG Tab Take 650 mg by mouth every four hours as needed.     • Multiple Vitamins-Minerals (PRESERVISION AREDS 2) Cap Take  by mouth.     • valsartan (DIOVAN) 320 MG tablet Take 1 Tab by mouth every day. 30 Tab 3   • doxazosin (CARDURA) 2 MG Tab 2 pills at night (Patient taking differently: Take 2 mg by mouth 2 Times a Day. 2 pills at night) 30 Tab 0   • Polyethylene Glycol 3350 (MIRALAX PO) Take  by mouth.     • aspirin (ASA) 81 MG CHEW chewable tablet Take 81 mg by mouth every day.     • simethicone (GAS-X) 80 MG CHEW Take 80 mg by mouth every 6 hours as needed.     • Lidocaine 4 % Patch 1 Patch by Apply externally route See Admin Instructions. Apply for 12hrs on and off for 12hrs 12 Patch 3   • lorazepam (ATIVAN) 0.5 MG Tab Take 0.5 mg by mouth every four hours as needed for Anxiety.     • Sennosides (SENNA LAX PO) Take 8.6 mg by mouth. Indications: 2 mon, wed, fri     • docusate sodium (COLACE) 100 MG Cap Take 100 mg by mouth 2 times a day.       No  current facility-administered medications on file prior to visit.            Signed by: Jaswinder Iqbal M.D.

## 2018-01-11 NOTE — TELEPHONE ENCOUNTER
Last seen: 01/11/18 by Dr. Iqbal  Next appt: 02/09/18 with Dr. Iqbal    Was the patient seen in the last year in this department? Yes   Does patient have an active prescription for medications requested? No   Received Request Via: Pharmacy

## 2018-01-12 ENCOUNTER — HOSPITAL ENCOUNTER (OUTPATIENT)
Dept: LAB | Facility: MEDICAL CENTER | Age: 83
End: 2018-01-12
Attending: INTERNAL MEDICINE
Payer: MEDICARE

## 2018-01-12 DIAGNOSIS — M79.89 LEG SWELLING: ICD-10-CM

## 2018-01-12 LAB
ALBUMIN SERPL BCP-MCNC: 4 G/DL (ref 3.2–4.9)
ALBUMIN/GLOB SERPL: 1.4 G/DL
ALP SERPL-CCNC: 79 U/L (ref 30–99)
ALT SERPL-CCNC: 14 U/L (ref 2–50)
ANION GAP SERPL CALC-SCNC: 7 MMOL/L (ref 0–11.9)
APPEARANCE UR: CLEAR
AST SERPL-CCNC: 20 U/L (ref 12–45)
BASOPHILS # BLD AUTO: 0.5 % (ref 0–1.8)
BASOPHILS # BLD: 0.03 K/UL (ref 0–0.12)
BILIRUB SERPL-MCNC: 0.5 MG/DL (ref 0.1–1.5)
BILIRUB UR QL STRIP.AUTO: NEGATIVE
BNP SERPL-MCNC: 94 PG/ML (ref 0–100)
BUN SERPL-MCNC: 23 MG/DL (ref 8–22)
CALCIUM SERPL-MCNC: 9.3 MG/DL (ref 8.5–10.5)
CHLORIDE SERPL-SCNC: 108 MMOL/L (ref 96–112)
CO2 SERPL-SCNC: 25 MMOL/L (ref 20–33)
COLOR UR: YELLOW
CREAT SERPL-MCNC: 0.75 MG/DL (ref 0.5–1.4)
EOSINOPHIL # BLD AUTO: 0.17 K/UL (ref 0–0.51)
EOSINOPHIL NFR BLD: 2.7 % (ref 0–6.9)
ERYTHROCYTE [DISTWIDTH] IN BLOOD BY AUTOMATED COUNT: 45.3 FL (ref 35.9–50)
GLOBULIN SER CALC-MCNC: 2.8 G/DL (ref 1.9–3.5)
GLUCOSE SERPL-MCNC: 78 MG/DL (ref 65–99)
GLUCOSE UR STRIP.AUTO-MCNC: NEGATIVE MG/DL
HCT VFR BLD AUTO: 40.1 % (ref 37–47)
HGB BLD-MCNC: 12.8 G/DL (ref 12–16)
IMM GRANULOCYTES # BLD AUTO: 0.02 K/UL (ref 0–0.11)
IMM GRANULOCYTES NFR BLD AUTO: 0.3 % (ref 0–0.9)
KETONES UR STRIP.AUTO-MCNC: NEGATIVE MG/DL
LEUKOCYTE ESTERASE UR QL STRIP.AUTO: NEGATIVE
LYMPHOCYTES # BLD AUTO: 1.61 K/UL (ref 1–4.8)
LYMPHOCYTES NFR BLD: 25.9 % (ref 22–41)
MCH RBC QN AUTO: 29.8 PG (ref 27–33)
MCHC RBC AUTO-ENTMCNC: 31.9 G/DL (ref 33.6–35)
MCV RBC AUTO: 93.5 FL (ref 81.4–97.8)
MICRO URNS: NORMAL
MONOCYTES # BLD AUTO: 0.65 K/UL (ref 0–0.85)
MONOCYTES NFR BLD AUTO: 10.5 % (ref 0–13.4)
NEUTROPHILS # BLD AUTO: 3.74 K/UL (ref 2–7.15)
NEUTROPHILS NFR BLD: 60.1 % (ref 44–72)
NITRITE UR QL STRIP.AUTO: NEGATIVE
NRBC # BLD AUTO: 0 K/UL
NRBC BLD-RTO: 0 /100 WBC
PH UR STRIP.AUTO: 5.5 [PH]
PLATELET # BLD AUTO: 164 K/UL (ref 164–446)
PMV BLD AUTO: 10.6 FL (ref 9–12.9)
POTASSIUM SERPL-SCNC: 3.7 MMOL/L (ref 3.6–5.5)
PROT SERPL-MCNC: 6.8 G/DL (ref 6–8.2)
PROT UR QL STRIP: NEGATIVE MG/DL
RBC # BLD AUTO: 4.29 M/UL (ref 4.2–5.4)
RBC UR QL AUTO: NEGATIVE
SODIUM SERPL-SCNC: 140 MMOL/L (ref 135–145)
SP GR UR STRIP.AUTO: 1.01
UROBILINOGEN UR STRIP.AUTO-MCNC: 0.2 MG/DL
WBC # BLD AUTO: 6.2 K/UL (ref 4.8–10.8)

## 2018-01-12 PROCEDURE — 80053 COMPREHEN METABOLIC PANEL: CPT

## 2018-01-12 PROCEDURE — 83880 ASSAY OF NATRIURETIC PEPTIDE: CPT | Mod: GA

## 2018-01-12 PROCEDURE — 36415 COLL VENOUS BLD VENIPUNCTURE: CPT

## 2018-01-12 PROCEDURE — 81003 URINALYSIS AUTO W/O SCOPE: CPT

## 2018-01-12 PROCEDURE — 85025 COMPLETE CBC W/AUTO DIFF WBC: CPT

## 2018-02-09 ENCOUNTER — OFFICE VISIT (OUTPATIENT)
Dept: INTERNAL MEDICINE | Facility: MEDICAL CENTER | Age: 83
End: 2018-02-09
Payer: MEDICARE

## 2018-02-09 VITALS
DIASTOLIC BLOOD PRESSURE: 98 MMHG | OXYGEN SATURATION: 96 % | WEIGHT: 121 LBS | TEMPERATURE: 98.3 F | HEART RATE: 65 BPM | SYSTOLIC BLOOD PRESSURE: 160 MMHG | RESPIRATION RATE: 18 BRPM | BODY MASS INDEX: 25.29 KG/M2

## 2018-02-09 DIAGNOSIS — M79.89 LEG SWELLING: ICD-10-CM

## 2018-02-09 DIAGNOSIS — K21.9 GASTROESOPHAGEAL REFLUX DISEASE WITHOUT ESOPHAGITIS: ICD-10-CM

## 2018-02-09 DIAGNOSIS — I10 ESSENTIAL HYPERTENSION: ICD-10-CM

## 2018-02-09 PROCEDURE — 99214 OFFICE O/P EST MOD 30 MIN: CPT | Performed by: INTERNAL MEDICINE

## 2018-02-09 RX ORDER — PANTOPRAZOLE SODIUM 20 MG/1
TABLET, DELAYED RELEASE ORAL
Qty: 180 TAB | Refills: 1 | Status: SHIPPED | OUTPATIENT
Start: 2018-02-09 | End: 2018-08-16

## 2018-02-09 RX ORDER — MOMETASONE FUROATE 50 UG/1
2 SPRAY, METERED NASAL DAILY
Qty: 1 INHALER | Refills: 3 | Status: SHIPPED | OUTPATIENT
Start: 2018-02-09 | End: 2018-08-16

## 2018-02-09 ASSESSMENT — PAIN SCALES - GENERAL: PAINLEVEL: 7=MODERATE-SEVERE PAIN

## 2018-02-09 NOTE — PROGRESS NOTES
Established Patient    Ms. Lund a 87 y.o. female who presents today with:  CC: Leg Swelling and Results (labs)        Assessment and Plan    1. Essential hypertension  Currently stable. Continue current regimen with pharmaceutical regimen. Although her DBP is above goal, I would suggest she continue with current regimen and continue to check home blood pressures  2. Leg swelling  Improved overall. Suggest she wear her compression hoses    3. Gastroesophageal reflux disease without esophagitis  Unchanged overall. Continue with PPI as directed  4. Abdominal pain  I suspect this is secondary to constipation vs. IBS. Strongly suggest one scoop of miralax per day and ineffective will treat for IBS    5. Vertigo  Strongly suspect secondary to chronic sinusitis. She is okay with using a nasal spray. I will send a prescription for nasonex    Current Outpatient Prescriptions   Medication Sig Dispense Refill   • pantoprazole (PROTONIX) 20 MG tablet TAKE ONE TABLET BY MOUTH TWICE A  Tab 1   • spironolactone (ALDACTONE) 25 MG Tab Take 1 Tab by mouth every day. 90 Tab 2   • ranitidine (ZANTAC) 150 MG Tab TAKE ONE TABLET BY MOUTH ONCE DAILY 90 Tab 2   • pantoprazole (PROTONIX) 40 MG Tablet Delayed Response TAKE ONE TABLET BY MOUTH ONCE DAILY 30 Tab 2   • Lidocaine 4 % Patch 1 Patch by Apply externally route See Admin Instructions. Apply for 12hrs on and off for 12hrs 12 Patch 3   • vitamin D (CHOLECALCIFEROL) 1000 UNIT Tab Take 1,000 Units by mouth every day.     • acetaminophen (TYLENOL) 325 MG Tab Take 650 mg by mouth every four hours as needed.     • Multiple Vitamins-Minerals (PRESERVISION AREDS 2) Cap Take  by mouth.     • valsartan (DIOVAN) 320 MG tablet Take 1 Tab by mouth every day. 30 Tab 3   • doxazosin (CARDURA) 2 MG Tab 2 pills at night (Patient taking differently: Take 2 mg by mouth 2 Times a Day. 2 pills at night) 30 Tab 0   • Polyethylene Glycol 3350 (MIRALAX PO) Take  by mouth.     • lorazepam (ATIVAN)  0.5 MG Tab Take 0.5 mg by mouth every four hours as needed for Anxiety.     • Sennosides (SENNA LAX PO) Take 8.6 mg by mouth. Indications: 2 mon, wed, fri     • docusate sodium (COLACE) 100 MG Cap Take 100 mg by mouth 2 times a day.     • aspirin (ASA) 81 MG CHEW chewable tablet Take 81 mg by mouth every day.     • simethicone (GAS-X) 80 MG CHEW Take 80 mg by mouth every 6 hours as needed.       No current facility-administered medications for this visit.          followup No Follow-up on file.    This note was created using voice recognition software (Dragon). The accuracy of the dictation is limited by the abilities of the software. I have reviewed the note prior to signing, however some errors in grammar and context are still possible. If you have any questions related to this note please do not hesitate to contact our office.   _______________________________________________________    HPI:   She's had some pain and dizziness. When she wakes, looks as her clock and feels dizzy for 20-30 minutes. She gets out of bed in sections. The dizziness can last for 2-3 hours. Feels unsteady. But not syncopal or faint. She hasnt felt dizzy in the last 2 days. She has a lot of sinus drainage. Occasionally sinus pressure and fullness. Her ears pop but she wears hearing aids.  She's had some bad pain days and some bad gas days. She can have some bloating as well. She was having BM q morning but not as predictable now. Stool is not constipation or diarrhea. No melena, blood. No chele colored. She has abdominal pain diffuse lower quadrants. In the morning she has 2-3 BM. ONce she has a BM or gas is gone her pain is better. She has not tried Miralax because she goes to the bathroom on her part. GERD is still active. At the last visit, I split her omeprazole to 20mg bid from 40mg qam. Not much of a difference in her symptoms. Back to taking one tablet in morning  1. Essential hypertension  She has a new blood pressure cuff. Has not  checked her blood pressures at home recently. She called Dr. Bloch's office recently. Today with her machine is 145/98. Dr. Bloch did not change her medications    2. Leg swelling  No change in leg swelling. BnP 94 and UA negative for  Proteinuria. GFR is normal. she normally wears only when she her legs are swollen not every day.          has a past medical history of Chronic venous insufficiency (5/26/2016); Hypertension (5/26/2016); LLQ abdominal mass (5/26/2016); Palpitations (5/26/2016); and WILLIAN (renal artery stenosis) (CMS-[facility]) (5/26/2016).     reports that she has never smoked. She has never used smokeless tobacco. She reports that she does not drink alcohol or use drugs.      ROS: Pertinent positives as stated in HPI, all others reviewed as negative:  Pulmonary ROS: No chronic cough, sputum, or hemoptysis, No dyspnea on exertion, No wheezing, No shortness of breath, No recent change in breathing  Cardiovascular ROS: No exercise intolerance, No chest pain, No shortness of breath, No dyspnea on exertion, No diaphoresis, No cyanosis, No syncope      Physical Exam  /98   Pulse 65   Temp 36.8 °C (98.3 °F)   Resp 18   Wt 54.9 kg (121 lb)   SpO2 96%   Breastfeeding? No   BMI 25.29 kg/m²   Constitutional:  oriented to person, place, and time. No distress.   Eyes: Pupils are equal, round, and reactive to light. No scleral icterus.   Neck: Neck supple. No thyromegaly present.   Cardiovascular: Normal rate, regular rhythm and normal heart sounds.  Exam reveals no gallop and no friction rub.    No murmur heard.  Pulmonary/Chest: Breath sounds normal. Chest wall is not dull to percussion.   Musculoskeletal:  Non pitting edema bilaterally mild          Current Outpatient Prescriptions on File Prior to Visit   Medication Sig Dispense Refill   • pantoprazole (PROTONIX) 20 MG tablet TAKE ONE TABLET BY MOUTH TWICE A  Tab 1   • spironolactone (ALDACTONE) 25 MG Tab Take 1 Tab by mouth every day. 90 Tab 2   •  ranitidine (ZANTAC) 150 MG Tab TAKE ONE TABLET BY MOUTH ONCE DAILY 90 Tab 2   • pantoprazole (PROTONIX) 40 MG Tablet Delayed Response TAKE ONE TABLET BY MOUTH ONCE DAILY 30 Tab 2   • Lidocaine 4 % Patch 1 Patch by Apply externally route See Admin Instructions. Apply for 12hrs on and off for 12hrs 12 Patch 3   • vitamin D (CHOLECALCIFEROL) 1000 UNIT Tab Take 1,000 Units by mouth every day.     • acetaminophen (TYLENOL) 325 MG Tab Take 650 mg by mouth every four hours as needed.     • Multiple Vitamins-Minerals (PRESERVISION AREDS 2) Cap Take  by mouth.     • valsartan (DIOVAN) 320 MG tablet Take 1 Tab by mouth every day. 30 Tab 3   • doxazosin (CARDURA) 2 MG Tab 2 pills at night (Patient taking differently: Take 2 mg by mouth 2 Times a Day. 2 pills at night) 30 Tab 0   • Polyethylene Glycol 3350 (MIRALAX PO) Take  by mouth.     • lorazepam (ATIVAN) 0.5 MG Tab Take 0.5 mg by mouth every four hours as needed for Anxiety.     • Sennosides (SENNA LAX PO) Take 8.6 mg by mouth. Indications: 2 mon, wed, fri     • docusate sodium (COLACE) 100 MG Cap Take 100 mg by mouth 2 times a day.     • aspirin (ASA) 81 MG CHEW chewable tablet Take 81 mg by mouth every day.     • simethicone (GAS-X) 80 MG CHEW Take 80 mg by mouth every 6 hours as needed.       No current facility-administered medications on file prior to visit.            Signed by: Jaswinder Iqbal M.D.

## 2018-04-30 ENCOUNTER — HOSPITAL ENCOUNTER (OUTPATIENT)
Dept: LAB | Facility: MEDICAL CENTER | Age: 83
End: 2018-04-30
Attending: INTERNAL MEDICINE
Payer: MEDICARE

## 2018-04-30 LAB
ALBUMIN SERPL BCP-MCNC: 3.7 G/DL (ref 3.2–4.9)
ALBUMIN/GLOB SERPL: 1.3 G/DL
ALP SERPL-CCNC: 77 U/L (ref 30–99)
ALT SERPL-CCNC: 12 U/L (ref 2–50)
ANION GAP SERPL CALC-SCNC: 6 MMOL/L (ref 0–11.9)
AST SERPL-CCNC: 17 U/L (ref 12–45)
BASOPHILS # BLD AUTO: 0.7 % (ref 0–1.8)
BASOPHILS # BLD: 0.05 K/UL (ref 0–0.12)
BILIRUB SERPL-MCNC: 0.5 MG/DL (ref 0.1–1.5)
BUN SERPL-MCNC: 23 MG/DL (ref 8–22)
CALCIUM SERPL-MCNC: 9.6 MG/DL (ref 8.5–10.5)
CHLORIDE SERPL-SCNC: 108 MMOL/L (ref 96–112)
CHOLEST SERPL-MCNC: 171 MG/DL (ref 100–199)
CO2 SERPL-SCNC: 25 MMOL/L (ref 20–33)
CREAT SERPL-MCNC: 0.76 MG/DL (ref 0.5–1.4)
CREAT UR-MCNC: 62.3 MG/DL
EOSINOPHIL # BLD AUTO: 0.14 K/UL (ref 0–0.51)
EOSINOPHIL NFR BLD: 1.9 % (ref 0–6.9)
ERYTHROCYTE [DISTWIDTH] IN BLOOD BY AUTOMATED COUNT: 49.3 FL (ref 35.9–50)
GLOBULIN SER CALC-MCNC: 2.9 G/DL (ref 1.9–3.5)
GLUCOSE SERPL-MCNC: 84 MG/DL (ref 65–99)
HCT VFR BLD AUTO: 39.9 % (ref 37–47)
HDLC SERPL-MCNC: 56 MG/DL
HGB BLD-MCNC: 12.5 G/DL (ref 12–16)
IMM GRANULOCYTES # BLD AUTO: 0.04 K/UL (ref 0–0.11)
IMM GRANULOCYTES NFR BLD AUTO: 0.6 % (ref 0–0.9)
LDLC SERPL CALC-MCNC: 100 MG/DL
LYMPHOCYTES # BLD AUTO: 1.35 K/UL (ref 1–4.8)
LYMPHOCYTES NFR BLD: 18.6 % (ref 22–41)
MCH RBC QN AUTO: 30 PG (ref 27–33)
MCHC RBC AUTO-ENTMCNC: 31.3 G/DL (ref 33.6–35)
MCV RBC AUTO: 95.7 FL (ref 81.4–97.8)
MICROALBUMIN UR-MCNC: <0.7 MG/DL
MICROALBUMIN/CREAT UR: NORMAL MG/G (ref 0–30)
MONOCYTES # BLD AUTO: 0.59 K/UL (ref 0–0.85)
MONOCYTES NFR BLD AUTO: 8.1 % (ref 0–13.4)
NEUTROPHILS # BLD AUTO: 5.07 K/UL (ref 2–7.15)
NEUTROPHILS NFR BLD: 70.1 % (ref 44–72)
NRBC # BLD AUTO: 0 K/UL
NRBC BLD-RTO: 0 /100 WBC
PLATELET # BLD AUTO: 156 K/UL (ref 164–446)
PMV BLD AUTO: 10.1 FL (ref 9–12.9)
POTASSIUM SERPL-SCNC: 3.9 MMOL/L (ref 3.6–5.5)
PROT SERPL-MCNC: 6.6 G/DL (ref 6–8.2)
RBC # BLD AUTO: 4.17 M/UL (ref 4.2–5.4)
SODIUM SERPL-SCNC: 139 MMOL/L (ref 135–145)
TRIGL SERPL-MCNC: 77 MG/DL (ref 0–149)
TSH SERPL DL<=0.005 MIU/L-ACNC: 3.08 UIU/ML (ref 0.38–5.33)
WBC # BLD AUTO: 7.2 K/UL (ref 4.8–10.8)

## 2018-04-30 PROCEDURE — 84443 ASSAY THYROID STIM HORMONE: CPT

## 2018-04-30 PROCEDURE — 36415 COLL VENOUS BLD VENIPUNCTURE: CPT

## 2018-04-30 PROCEDURE — 85025 COMPLETE CBC W/AUTO DIFF WBC: CPT

## 2018-04-30 PROCEDURE — 80061 LIPID PANEL: CPT

## 2018-04-30 PROCEDURE — 82043 UR ALBUMIN QUANTITATIVE: CPT

## 2018-04-30 PROCEDURE — 80053 COMPREHEN METABOLIC PANEL: CPT

## 2018-04-30 PROCEDURE — 82570 ASSAY OF URINE CREATININE: CPT

## 2018-05-09 ENCOUNTER — APPOINTMENT (OUTPATIENT)
Dept: INTERNAL MEDICINE | Facility: MEDICAL CENTER | Age: 83
End: 2018-05-09
Payer: MEDICARE

## 2018-05-11 ENCOUNTER — OFFICE VISIT (OUTPATIENT)
Dept: INTERNAL MEDICINE | Facility: MEDICAL CENTER | Age: 83
End: 2018-05-11
Payer: MEDICARE

## 2018-05-11 VITALS
SYSTOLIC BLOOD PRESSURE: 130 MMHG | DIASTOLIC BLOOD PRESSURE: 86 MMHG | OXYGEN SATURATION: 98 % | TEMPERATURE: 97.7 F | WEIGHT: 119.2 LBS | BODY MASS INDEX: 25.02 KG/M2 | HEIGHT: 58 IN | HEART RATE: 72 BPM

## 2018-05-11 DIAGNOSIS — G89.29 CHRONIC NECK PAIN: ICD-10-CM

## 2018-05-11 DIAGNOSIS — I10 ESSENTIAL HYPERTENSION: ICD-10-CM

## 2018-05-11 DIAGNOSIS — M54.2 CHRONIC NECK PAIN: ICD-10-CM

## 2018-05-11 DIAGNOSIS — K21.9 GASTROESOPHAGEAL REFLUX DISEASE WITHOUT ESOPHAGITIS: ICD-10-CM

## 2018-05-11 PROCEDURE — 99214 OFFICE O/P EST MOD 30 MIN: CPT | Performed by: INTERNAL MEDICINE

## 2018-05-11 RX ORDER — DOXAZOSIN 2 MG/1
2 TABLET ORAL 2 TIMES DAILY
Qty: 20 TAB | Refills: 1
Start: 2018-05-11 | End: 2018-07-13 | Stop reason: SDUPTHER

## 2018-05-11 RX ORDER — PANTOPRAZOLE SODIUM 20 MG/1
20 TABLET, DELAYED RELEASE ORAL 2 TIMES DAILY
Qty: 20 TAB | Refills: 1
Start: 2018-05-11 | End: 2018-08-17

## 2018-05-11 ASSESSMENT — PATIENT HEALTH QUESTIONNAIRE - PHQ9: CLINICAL INTERPRETATION OF PHQ2 SCORE: 0

## 2018-05-11 NOTE — PROGRESS NOTES
Established Patient    Ms. Lund a 87 y.o. female who presents today with:  CC: Hypertension and Edema (Better but still with cramping )        Assessment and Plan    1. Essential hypertension  Patient has history of essential hypertension and probable renal artery stenosis. In the past she has refused renal vascular stenting. Systolic blood pressures are mostly in the 150-160 range with diastolics in the mid 60s. Very rare hypotensive readings in which she is normally not symptomatic. Currently treated with Cardura, valsartan and spironolactone. Ideally, we would prefer lower systolic blood pressures, however patient is frail and has not tolerated multiple medications in the past. She currently is doing well with this regimen. At this point, we'll continue with the current regimen without making changes. She agrees with the plan.    2. Chronic neck pain-secondary to DJD and DDD. She has tried multiple medications have been ineffective. In addition, I'm concerned about potential side effects with using pharmaceuticals to manage her pain. This will therapy and home exercise programs have not been effective. I did refer her to pain clinic in the past but appointment was not made. I suspect that she may do well with trigger point injections. We discussed a referral to pain clinic and she is willing to go. Therefore I will refer her.    3. Gastroesophageal reflux disease-she has had improvement with twice a day moderate dose PPIs plus nighttime antihistamine therapy. She has occasional symptoms. We discussed the utility of ADD testing and her. It may be difficult because of her prior neck surgery. At this point she would just like to monitor the symptoms because she does not want to have too many referrals. She'll like to focus on her neck. She currently has no red flags. I agree with her decision.  - doxazosin (CARDURA) 2 MG Tab; Take 1 Tab by mouth 2 Times a Day. 2 pills at night  Dispense: 20 Tab; Refill:  1      Current Outpatient Prescriptions   Medication Sig Dispense Refill   • doxazosin (CARDURA) 2 MG Tab Take 1 Tab by mouth 2 Times a Day. 2 pills at night 20 Tab 1   • pantoprazole (PROTONIX) 20 MG tablet Take 1 Tab by mouth 2 times a day. 20 Tab 1   • pantoprazole (PROTONIX) 20 MG tablet TAKE ONE TABLET BY MOUTH TWICE A  Tab 1   • spironolactone (ALDACTONE) 25 MG Tab Take 1 Tab by mouth every day. 90 Tab 2   • ranitidine (ZANTAC) 150 MG Tab TAKE ONE TABLET BY MOUTH ONCE DAILY 90 Tab 2   • vitamin D (CHOLECALCIFEROL) 1000 UNIT Tab Take 1,000 Units by mouth every day.     • Multiple Vitamins-Minerals (PRESERVISION AREDS 2) Cap Take  by mouth.     • valsartan (DIOVAN) 320 MG tablet Take 1 Tab by mouth every day. 30 Tab 3   • Polyethylene Glycol 3350 (MIRALAX PO) Take  by mouth.     • aspirin (ASA) 81 MG CHEW chewable tablet Take 81 mg by mouth every day.     • mometasone (NASONEX) 50 MCG/ACT nasal spray Spray 2 Sprays in nose every day. 1 Inhaler 3   • Lidocaine 4 % Patch 1 Patch by Apply externally route See Admin Instructions. Apply for 12hrs on and off for 12hrs 12 Patch 3   • acetaminophen (TYLENOL) 325 MG Tab Take 650 mg by mouth every four hours as needed.     • Sennosides (SENNA LAX PO) Take 8.6 mg by mouth. Indications: 2 mon, wed, fri     • docusate sodium (COLACE) 100 MG Cap Take 100 mg by mouth 2 times a day.     • simethicone (GAS-X) 80 MG CHEW Take 80 mg by mouth every 6 hours as needed.       No current facility-administered medications for this visit.          followup No Follow-up on file.    This note was created using voice recognition software (Dragon). The accuracy of the dictation is limited by the abilities of the software. I have reviewed the note prior to signing, however some errors in grammar and context are still possible. If you have any questions related to this note please do not hesitate to contact our office.   _______________________________________________________    HPI:  here for followup on blood pressure and reflux sxs.  She switched to 6 meals per day which helps her heartburn symptoms. The switch from 40 mg of Protonix daily to 20 bid was more effective in managing the symptoms. She still has heartburn however.  Taking protonix daily bid 20mg with zantac 150mg qhs. Has burning in throat and chest feels like heartburn. No CP with exertion or left chest pain with radiation. No change in chronic sxs of dysphagia since she had neck surgery many years ago due to metal plate. Dysphagia is rare and no odynophagia. She saw Dr. Osgood 3 years ago (she thinks). No melena.    She brought in her blood pressure recordings. She had one recording with SB 81 with Heart rate of 112. At that time no sxs.  REst of values are mostly in the 150s over  Mid 60s. Takes 3 medications including valsartan, spironolactone and cardura. No symptoms of chest pain or SOB, dizziness     Sometimes takes an xtra tylenol for her neck pain. Takes 2 regular at 11am and at this time during the day her pain is okay. But, most of the day she has pain in the trapezius distribution and midline cervical spine. Muscles are stiff. Has tried pain patches and cream without effect. Tylenol makes her sleepy thus she cannot take more than 3 per day. Has never had injections therapy for her chronic neck pain. I referred her to pain clinic in 2017 but appointment was not made.  Chronic pain.  PT has not helped. Nor have home exercises.  Xray 2007 with subluxation of c4-c5 , c5-6 and DDD at c6-7           has a past medical history of Chronic venous insufficiency (5/26/2016); Hypertension (5/26/2016); LLQ abdominal mass (5/26/2016); Palpitations (5/26/2016); and WILLIAN (renal artery stenosis) ([facility]) (5/26/2016).     reports that she has never smoked. She has never used smokeless tobacco. She reports that she does not drink alcohol or use drugs.      ROS: Pertinent positives as stated in HPI, all others reviewed as  "negative:        Physical Exam  /86   Pulse 72   Temp 36.5 °C (97.7 °F)   Ht 1.473 m (4' 10\")   Wt 54.1 kg (119 lb 3.2 oz)   SpO2 98%   BMI 24.91 kg/m²   Constitutional:  oriented to person, place, and time. No distress.   Eyes: Pupils are equal, round, and reactive to light. No scleral icterus.   Neck: Neck supple. No thyromegaly present.   Cardiovascular: Normal rate, regular rhythm and normal heart sounds.  Exam reveals no gallop and no friction rub.    No murmur heard.  Pulmonary/Chest: Breath sounds normal. Chest wall is not dull to percussion.   Musculoskeletal:   no edema. Tight firm trapezius muscles bilateral. Painful to palpation throughout. Unable to flex at neck          Current Outpatient Prescriptions on File Prior to Visit   Medication Sig Dispense Refill   • pantoprazole (PROTONIX) 20 MG tablet TAKE ONE TABLET BY MOUTH TWICE A  Tab 1   • spironolactone (ALDACTONE) 25 MG Tab Take 1 Tab by mouth every day. 90 Tab 2   • ranitidine (ZANTAC) 150 MG Tab TAKE ONE TABLET BY MOUTH ONCE DAILY 90 Tab 2   • vitamin D (CHOLECALCIFEROL) 1000 UNIT Tab Take 1,000 Units by mouth every day.     • Multiple Vitamins-Minerals (PRESERVISION AREDS 2) Cap Take  by mouth.     • valsartan (DIOVAN) 320 MG tablet Take 1 Tab by mouth every day. 30 Tab 3   • Polyethylene Glycol 3350 (MIRALAX PO) Take  by mouth.     • aspirin (ASA) 81 MG CHEW chewable tablet Take 81 mg by mouth every day.     • mometasone (NASONEX) 50 MCG/ACT nasal spray Spray 2 Sprays in nose every day. 1 Inhaler 3   • Lidocaine 4 % Patch 1 Patch by Apply externally route See Admin Instructions. Apply for 12hrs on and off for 12hrs 12 Patch 3   • acetaminophen (TYLENOL) 325 MG Tab Take 650 mg by mouth every four hours as needed.     • Sennosides (SENNA LAX PO) Take 8.6 mg by mouth. Indications: 2 mon, wed, fri     • docusate sodium (COLACE) 100 MG Cap Take 100 mg by mouth 2 times a day.     • simethicone (GAS-X) 80 MG CHEW Take 80 mg by mouth " every 6 hours as needed.       No current facility-administered medications on file prior to visit.            Signed by: Jaswinder Iqbal M.D.

## 2018-07-11 ENCOUNTER — TELEPHONE (OUTPATIENT)
Dept: INTERNAL MEDICINE | Facility: MEDICAL CENTER | Age: 83
End: 2018-07-11

## 2018-07-11 NOTE — TELEPHONE ENCOUNTER
1. Caller Name: Pt                      Call Back Number: [phone] (home)     2. Message: Patient called and left a message yesterday stating she needed a call from Dr. Iqbal due to come medication reaction she was having. She wasn't going to be home until after 3pm.    3. Patient approves office to leave a detailed voicemail/[employer] message: N\A    I called patient back today and let her know that I received her message and I was returning her call. I asked what medication she thinks is causing problems and what problems is she having. She let me know that she believes it is Pantoprazole that is causing her to have headaches, confusion, diarrhea, lightheadedness and weakness. She states she had a few issues when she had first received medication back on May of 2017 but she had stopped it back then. Now that she had started it back up she thought that it would be some issues that would resolve but now it's been a week that her symptoms have gotten worse. I told her that it would be a good idea for her to come in and get seen, I explained that it wouldn't be with Dr. Iqbal and if that would be okay I would be able to get her in this week. She was a little hesitant but accepted and I scheduled her with Dr. Rosas on Friday

## 2018-07-13 ENCOUNTER — OFFICE VISIT (OUTPATIENT)
Dept: INTERNAL MEDICINE | Facility: MEDICAL CENTER | Age: 83
End: 2018-07-13
Payer: MEDICARE

## 2018-07-13 VITALS
HEART RATE: 73 BPM | OXYGEN SATURATION: 96 % | BODY MASS INDEX: 24.14 KG/M2 | TEMPERATURE: 98.9 F | SYSTOLIC BLOOD PRESSURE: 100 MMHG | DIASTOLIC BLOOD PRESSURE: 60 MMHG | HEIGHT: 58 IN | WEIGHT: 115 LBS

## 2018-07-13 DIAGNOSIS — R01.1 HEART MURMUR, SYSTOLIC: ICD-10-CM

## 2018-07-13 DIAGNOSIS — K59.04 CHRONIC IDIOPATHIC CONSTIPATION: ICD-10-CM

## 2018-07-13 DIAGNOSIS — I10 ESSENTIAL HYPERTENSION: ICD-10-CM

## 2018-07-13 DIAGNOSIS — I95.1 ORTHOSTATIC HYPOTENSION: ICD-10-CM

## 2018-07-13 DIAGNOSIS — K21.9 GASTROESOPHAGEAL REFLUX DISEASE WITHOUT ESOPHAGITIS: ICD-10-CM

## 2018-07-13 PROCEDURE — 99214 OFFICE O/P EST MOD 30 MIN: CPT | Mod: GC | Performed by: INTERNAL MEDICINE

## 2018-07-13 RX ORDER — SPIRONOLACTONE 25 MG/1
12.5 TABLET ORAL EVERY EVENING
Qty: 30 TAB | Refills: 1 | Status: SHIPPED | OUTPATIENT
Start: 2018-07-13 | End: 2018-08-16

## 2018-07-13 RX ORDER — DOXAZOSIN 2 MG/1
2 TABLET ORAL EVERY EVENING
Qty: 30 TAB | Refills: 1 | Status: SHIPPED | OUTPATIENT
Start: 2018-07-13 | End: 2018-08-17

## 2018-07-13 NOTE — PATIENT INSTRUCTIONS
Take valsartan 320 mg with breakfast   Spironolactone dose will be decreased to 12.5 mg and will now take this medication in the evening   Your doxazosin dose will be decreased to (1) 2mg tab in the evening   Stay hydrated with water, drink at least 32 oz of water daily   Change position carefully   Check your blood pressure daily over the weekend and record these numbers and sent to Dr. Iqbal       Orthostatic Hypotension  Orthostatic hypotension is a sudden drop in blood pressure that happens when you quickly change positions, such as when you get up from a seated or lying position. Blood pressure is a measurement of how strongly, or weakly, your blood is pressing against the walls of your arteries. Arteries are blood vessels that carry blood from your heart throughout your body. When blood pressure is too low, you may not get enough blood to your brain or to the rest of your organs. This can cause weakness, light-headedness, rapid heartbeat, and fainting. This can last for just a few seconds or for up to a few minutes.  Orthostatic hypotension is usually not a serious problem. However, if it happens frequently or gets worse, it may be a sign of something more serious.  What are the causes?  This condition may be caused by:  · Sudden changes in posture, such as standing up quickly after you have been sitting or lying down.  · Blood loss.  · Loss of body fluids (dehydration).  · Heart problems.  · Hormone (endocrine) problems.  · Pregnancy.  · Severe infection.  · Lack of certain nutrients.  · Severe allergic reactions (anaphylaxis).  · Certain medicines, such as blood pressure medicine or medicines that make the body lose excess fluids (diuretics). Sometimes, this condition can be caused by not taking medicine as directed, such as taking too much of a certain medicine.  What increases the risk?  Certain factors can make you more likely to develop orthostatic hypotension, including:  · Age. Risk increases as  "you get older.  · Conditions that affect the heart or the central nervous system.  · Taking certain medicines, such as blood pressure medicine or diuretics.  · Being pregnant.  What are the signs or symptoms?  Symptoms of this condition may include:  · Weakness.  · Light-headedness.  · Dizziness.  · Blurred vision.  · Fatigue.  · Rapid heartbeat.  · Fainting, in severe cases.  How is this diagnosed?  This condition is diagnosed based on:  · Your medical history.  · Your symptoms.  · Your blood pressure measurement. Your health care provider will check your blood pressure when you are:  ¨ Lying down.  ¨ Sitting.  ¨ Standing.  A blood pressure reading is recorded as two numbers, such as \"120 over 80\" (or 120/80). The first (\"top\") number is called the systolic pressure. It is a measure of the pressure in your arteries as your heart beats. The second (\"bottom\") number is called the diastolic pressure. It is a measure of the pressure in your arteries when your heart relaxes between beats. Blood pressure is measured in a unit called mm Hg. Healthy blood pressure for adults is 120/80. If your blood pressure is below 90/60, you may be diagnosed with hypotension.  Other information or tests that may be used to diagnose orthostatic hypotension include:  · Your other vital signs, such as your heart rate and temperature.  · Blood tests.  · Tilt table test. For this test, you will be safely secured to a table that moves you from a lying position to an upright position. Your heart rhythm and blood pressure will be monitored during the test.  How is this treated?  Treatment for this condition may include:  · Changing your diet. This may involve eating more salt (sodium) or drinking more water.  · Taking medicines to raise your blood pressure.  · Changing the dosage of certain medicines you are taking that might be lowering your blood pressure.  · Wearing compression stockings. These stockings help to prevent blood clots and " reduce swelling in your legs.  In some cases, you may need to go to the hospital for:  · Fluid replacement. This means you will receive fluids through an IV tube.  · Blood replacement. This means you will receive donated blood through an IV tube (transfusion).  · Treating an infection or heart problems, if this applies.  · Monitoring. You may need to be monitored while medicines that you are taking wear off.  Follow these instructions at home:  Eating and drinking  · Drink enough fluid to keep your urine clear or pale yellow.  · Eat a healthy diet and follow instructions from your health care provider about eating or drinking restrictions. A healthy diet includes:  ¨ Fresh fruits and vegetables.  ¨ Whole grains.  ¨ Lean meats.  ¨ Low-fat dairy products.  · Eat extra salt only as directed. Do not add extra salt to your diet unless your health care provider told you to do that.  · Eat frequent, small meals.  · Avoid standing up suddenly after eating.  Medicines  · Take over-the-counter and prescription medicines only as told by your health care provider.  ¨ Follow instructions from your health care provider about changing the dosage of your current medicines, if this applies.  ¨ Do not stop or adjust any of your medicines on your own.  General instructions  · Wear compression stockings as told by your health care provider.  · Get up slowly from lying down or sitting positions. This gives your blood pressure a chance to adjust.  · Avoid hot showers and excessive heat as directed by your health care provider.  · Return to your normal activities as told by your health care provider. Ask your health care provider what activities are safe for you.  · Do not use any products that contain nicotine or tobacco, such as cigarettes and e-cigarettes. If you need help quitting, ask your health care provider.  · Keep all follow-up visits as told by your health care provider. This is important.  Contact a health care provider  if:  · You vomit.  · You have diarrhea.  · You have a fever for more than 2-3 days.  · You feel more thirsty than usual.  · You feel weak and tired.  Get help right away if:  · You have chest pain.  · You have a fast or irregular heartbeat.  · You develop numbness in any part of your body.  · You cannot move your arms or your legs.  · You have trouble speaking.  · You become sweaty or feel lightheaded.  · You faint.  · You feel short of breath.  · You have trouble staying awake.  · You feel confused.  This information is not intended to replace advice given to you by your health care provider. Make sure you discuss any questions you have with your health care provider.  Document Released: 12/08/2003 Document Revised: 09/05/2017 Document Reviewed: 06/09/2017  Elsevier Interactive Patient Education © 2017 [employer]

## 2018-07-13 NOTE — PROGRESS NOTES
"      Established Patient    Destiny presents today with the following:    CC: lightheadedness, fatigue, leg cramps     HPI:   Ms. Burns is a very pleasant 86 yo female with PMHx of WILLIAN, hypertension, generalized abdominal pain, esophgaeal reflux and chronic neck pain who presents to clinic for symptoms of lightheadedness, fatigue and worsening leg cramps since May. Patient was concerned that her pantoprazole was the cause as she was changed from a delayed to  Immediate release formulation during this same time period.   Patient states that symptoms most often occur usually around 8-9 in the AM soon after taking her morning blood pressure medications and are associated with positional change, she denies any recent falls. She notes minimal water intake, approximately 20 oz per day, and decreased urine output.   She denies syncope or chest pain, but she is unable to acknowledge if she has SOB because the symptoms often make her anxious and she confuses the sensation with dyspnea. Symptoms improve later in the day. She takes her blood pressure randomly during the day and has noted many of her blood pressures to be 100's/60's but does states she has had several systolic BP's in the high 80's and low 90's. She does note symptoms when her BP is this low.   She also complains of leg cramps \"soni horses\" at night, she has been getting them nightly and is now starting to have chronic calf pain even during the day. She denies palpitations.   She denies worsening abdominal pain but has constant bloating, worsened by consumption of milk and vegetables. She does feel as though she has constipation although states she does go daily. She denies any black or bloody stools. She denies dysuria or mehran hematuria.     Patient Active Problem List    Diagnosis Date Noted   • Leg swelling 01/11/2018   • Vitamin D deficiency 04/13/2017   • Osteopenia 04/13/2017   • Vertigo 04/13/2017   • Primary osteoarthritis of shoulder 04/13/2017   • " Generalized abdominal pain 07/21/2016   • Heart murmur, systolic 05/26/2016   • LLQ abdominal mass 05/26/2016   • Leg cramps 05/26/2016   • Chronic neck pain 05/26/2016   • Dyslipidemia 05/26/2016   • WILLIAN (renal artery stenosis) ([facility]) 05/26/2016   • Hypertension 05/26/2016   • Essential hypertension, benign 02/24/2015   • Esophageal reflux 02/24/2015   • Cervical disc disease 02/24/2015       Current Outpatient Prescriptions   Medication Sig Dispense Refill   • spironolactone (ALDACTONE) 25 MG Tab Take 0.5 Tabs by mouth every evening. 30 Tab 1   • doxazosin (CARDURA) 2 MG Tab Take 1 Tab by mouth every evening. 30 Tab 1   • pantoprazole (PROTONIX) 20 MG tablet TAKE ONE TABLET BY MOUTH TWICE A  Tab 1   • ranitidine (ZANTAC) 150 MG Tab TAKE ONE TABLET BY MOUTH ONCE DAILY 90 Tab 2   • vitamin D (CHOLECALCIFEROL) 1000 UNIT Tab Take 1,000 Units by mouth every day.     • acetaminophen (TYLENOL) 325 MG Tab Take 650 mg by mouth every four hours as needed.     • Multiple Vitamins-Minerals (PRESERVISION AREDS 2) Cap Take  by mouth.     • valsartan (DIOVAN) 320 MG tablet Take 1 Tab by mouth every day. 30 Tab 3   • Polyethylene Glycol 3350 (MIRALAX PO) Take  by mouth.     • Sennosides (SENNA LAX PO) Take 8.6 mg by mouth. Indications: 2 mon, wed, fri     • docusate sodium (COLACE) 100 MG Cap Take 100 mg by mouth 2 times a day.     • aspirin (ASA) 81 MG CHEW chewable tablet Take 81 mg by mouth every day.     • simethicone (GAS-X) 80 MG CHEW Take 80 mg by mouth every 6 hours as needed.     • pantoprazole (PROTONIX) 20 MG tablet Take 1 Tab by mouth 2 times a day. 20 Tab 1   • mometasone (NASONEX) 50 MCG/ACT nasal spray Spray 2 Sprays in nose every day. 1 Inhaler 3   • Lidocaine 4 % Patch 1 Patch by Apply externally route See Admin Instructions. Apply for 12hrs on and off for 12hrs 12 Patch 3     No current facility-administered medications for this visit.        ROS: As per HPI. Additional pertinent symptoms as noted  "below.    Constitutional: denies fever or chills, + fatigue   Eyes: denies blurry or new changes in vision   Cardiovascular: denies chest pain, palpitations, orthopnea and PND  Respiratory: denies overt SOB, or cough   GI: + abdominal bloating, denies diarrhea or blood in stool   : denies dysuria, + decreased urination   Musculo-skeletal: + bilateral lower excremity cramping nightly, + chronic neck pain, denies recent falls   Neurological: denies numbness, tingling, denies dizziness or spinning sensation, + chronic intermittent headaches     /60   Pulse 73   Temp 37.2 °C (98.9 °F)   Ht 1.473 m (4' 10\")   Wt 52.2 kg (115 lb)   SpO2 96%   BMI 24.04 kg/m²     Physical Exam   Constitutional:  Non-ill appearing elderly female, in no acute distress   Eyes: Pupils are equal, round, and reactive to light. No scleral icterus. Wears corrective lenses   Neck: Neck supple. No thyromegaly present. No JVD, no audible carotid bruits   Cardiovascular: Normal rate, regular rhythm and normal heart sounds.  Exam reveals no gallop and no friction rub. No audible systolic murmur although noted in previous visits.   Pulmonary/Chest: Breath sounds clear to ascultation bilaterally, no wheezing or crackles. Kyphosis present with well healed surgical scars   Musculoskeletal:   no edema. No obvious joint swelling or effusions, no palpable cords in b/l calfs, no erythema, mild varicose veins in bilateral lower extremities   Neurological: alert and oriented to person, place, and time. Strength intact bilaterally  Skin: No cyanosis. Nails show no clubbing.      Assessment and Plan    1. Essential hypertension  - hx of difficult to control blood pressure secondary to WILLIAN  - follows with doctor bloch for vascular medicine   - due to symptoms and positive orthostatic vitals, hypertensive regiment will be changed   - follow up with PCP in 1 month with log of BP following changes     2. Orthostatic hypotension  - pt presenting with " symptoms suggestive of orthostatic hypotension, likely related to antihypertensive therapy   - orthostatic vitals: Supine 120/74, sitting 94/58, standing 100/60, HR 73,74,73  - medication changes as follows: continue current losartan 320 mg in AM, spironolactone decreased from 25 mg to 12.5 and moved to PM dosing, doxazosin decreased from 4mg at night to 2 mg at night   - pt educated to log blood pressure over weekend and to report to PCP to ensure proper dose adjustments   - encouraged water intake to at least 32 oz daily   - educated on importance of careful position changes and fall precautions   - follow up with Dr. Bloch and Dr. Iqbal as scheduled     3. Heart murmur, systolic  - hx of systolic heart murmur, not audible on my exam   - echocardiogram in 2016 not suggestive of valvular disease, so symptoms unlikely to be related to valvular etiology such as aortic stenosis     4. Chronic idiopathic constipation  - pt complains of difficulty with bowel movements, although notes daily BMs  - encouraged proper hydration and OTC miralax for symptom relief   - encouraged high fiber diet     5. Gastroesophageal reflux disease without esophagitis  - hx of reflux, still having intermittent symptoms, mostly symptoms of indigestion and bloating  - educated on avoidance of foods that exacerbate symptoms   - continue protonix   - follow up at next visit       Followup: Return in about 5 weeks (around 8/16/2018) for Dr. Farida Grayson. .      Signed by: Vangie Rosas M.D.

## 2018-08-06 ENCOUNTER — OFFICE VISIT (OUTPATIENT)
Dept: INTERNAL MEDICINE | Facility: MEDICAL CENTER | Age: 83
End: 2018-08-06
Payer: MEDICARE

## 2018-08-06 VITALS
HEIGHT: 58 IN | SYSTOLIC BLOOD PRESSURE: 140 MMHG | HEART RATE: 74 BPM | TEMPERATURE: 97.6 F | BODY MASS INDEX: 23.89 KG/M2 | DIASTOLIC BLOOD PRESSURE: 93 MMHG | WEIGHT: 113.8 LBS | OXYGEN SATURATION: 95 %

## 2018-08-06 DIAGNOSIS — M54.9 CHRONIC BILATERAL BACK PAIN, UNSPECIFIED BACK LOCATION: ICD-10-CM

## 2018-08-06 DIAGNOSIS — Z13.0 SCREENING FOR DEFICIENCY ANEMIA: ICD-10-CM

## 2018-08-06 DIAGNOSIS — R30.0 BURNING WITH URINATION: ICD-10-CM

## 2018-08-06 DIAGNOSIS — G89.29 CHRONIC BILATERAL BACK PAIN, UNSPECIFIED BACK LOCATION: ICD-10-CM

## 2018-08-06 DIAGNOSIS — R42 DIZZINESS: ICD-10-CM

## 2018-08-06 PROCEDURE — 99214 OFFICE O/P EST MOD 30 MIN: CPT | Mod: GC | Performed by: INTERNAL MEDICINE

## 2018-08-06 NOTE — PROGRESS NOTES
"Established Patient    Destiny presents today with the following:    CC:   Patient presents for worsening back pain and dizziness worsening in the last 2-3 weeks as well    HPI:   Patient is a 87-year-old female who presents for an urgent same-day visit complaining of dizziness and lots of back pain for the past 2-3 weeks.  Patient states that she has had a lot of chronic back pain and the worsening is in the soft tissue on both sides of her spine and the lumbar thoracic region, she states the pain got worse in the last 2 weeks has been 10 out of 10 at times but currently is 6 out of 10 in severity is not radiating anywhere. Patient says she has a sit down and work on it in the mornings 1 hour after getting up the pain is 10 out of 10 in severity, she states she gets \"charley horses\" in her lower extremities bilaterally in the calves and sometimes causing her excruciating pain.  Patient states that she has been taking Tylenol for the pain control and currently takes 2 around 11:30 in the morning and 2 around that time and this is the regular dose at 325 mg.  Patient states she also experiences a pulling discomfort in her left leg greater than the right but she has been relieving this by massage she does herself.  Patient denies shooting pain into her legs bilaterally but states that sometimes there is a shooting sensation into her left leg, patient denies numbness and tingling.  Patient states she has trouble walking feels like there is a rope tied to her knee and pulling sensation is worse sometimes when she is walking and states that her knees have been hurting badly.  Patient states she also has been getting dizzy and shaky for the past 2-3 weeks and does not know if this is related to her blood pressure not, she feels lightheadedness when she walks around, has been taking precaution to stand and sit slowly to not elicit dizziness.  Patient states sometimes the room was moving and moving quickly around her but " mostly it is lightheadedness when she is walks around from sitting after a long period of time.  Patient is seeking advice to increase her Tylenol pain medication for her pain relief.    Patient Active Problem List    Diagnosis Date Noted   • Leg swelling 01/11/2018   • Vitamin D deficiency 04/13/2017   • Osteopenia 04/13/2017   • Vertigo 04/13/2017   • Primary osteoarthritis of shoulder 04/13/2017   • Generalized abdominal pain 07/21/2016   • Heart murmur, systolic 05/26/2016   • LLQ abdominal mass 05/26/2016   • Leg cramps 05/26/2016   • Chronic neck pain 05/26/2016   • Dyslipidemia 05/26/2016   • WILLIAN (renal artery stenosis) ([facility]) 05/26/2016   • Hypertension 05/26/2016   • Essential hypertension, benign 02/24/2015   • Esophageal reflux 02/24/2015   • Cervical disc disease 02/24/2015       Current Outpatient Prescriptions   Medication Sig Dispense Refill   • spironolactone (ALDACTONE) 25 MG Tab Take 0.5 Tabs by mouth every evening. 30 Tab 1   • doxazosin (CARDURA) 2 MG Tab Take 1 Tab by mouth every evening. 30 Tab 1   • pantoprazole (PROTONIX) 20 MG tablet Take 1 Tab by mouth 2 times a day. 20 Tab 1   • ranitidine (ZANTAC) 150 MG Tab TAKE ONE TABLET BY MOUTH ONCE DAILY 90 Tab 2   • vitamin D (CHOLECALCIFEROL) 1000 UNIT Tab Take 1,000 Units by mouth every day.     • Multiple Vitamins-Minerals (PRESERVISION AREDS 2) Cap Take  by mouth.     • valsartan (DIOVAN) 320 MG tablet Take 1 Tab by mouth every day. 30 Tab 3   • Polyethylene Glycol 3350 (MIRALAX PO) Take  by mouth.     • Sennosides (SENNA LAX PO) Take 8.6 mg by mouth. Indications: 2 mon, wed, fri     • aspirin (ASA) 81 MG CHEW chewable tablet Take 81 mg by mouth every day.     • simethicone (GAS-X) 80 MG CHEW Take 80 mg by mouth every 6 hours as needed.     • pantoprazole (PROTONIX) 20 MG tablet TAKE ONE TABLET BY MOUTH TWICE A  Tab 1   • mometasone (NASONEX) 50 MCG/ACT nasal spray Spray 2 Sprays in nose every day. 1 Inhaler 3   • Lidocaine 4 % Patch 1  "Patch by Apply externally route See Admin Instructions. Apply for 12hrs on and off for 12hrs 12 Patch 3   • acetaminophen (TYLENOL) 325 MG Tab Take 650 mg by mouth every four hours as needed.     • docusate sodium (COLACE) 100 MG Cap Take 100 mg by mouth 2 times a day.       No current facility-administered medications for this visit.        Social History     Social History   • Marital status: Single     Spouse name: N/A   • Number of children: N/A   • Years of education: N/A     Occupational History   • Not on file.     Social History Main Topics   • Smoking status: Never Smoker   • Smokeless tobacco: Never Used   • Alcohol use No   • Drug use: No   • Sexual activity: Not on file     Other Topics Concern   • Not on file     Social History Narrative   • No narrative on file       Family History   Problem Relation Age of Onset   • Cancer Mother         ORAL   • Cancer Unknown         AUNT   • Cancer Unknown         SKIN       ROS: As per HPI. Additional pertinent symptoms as noted below.    Patient states slight lightheadedness at the present time while sitting and also complains of dysuria and on for 3 weeks and some burning on urination.    All other review systems are constitutionally negative at this time.    /93   Pulse 74   Temp 36.4 °C (97.6 °F)   Ht 1.473 m (4' 10\")   Wt 51.6 kg (113 lb 12.8 oz)   SpO2 95%   BMI 23.78 kg/m²     Physical Exam  General:  Alert and oriented, No apparent distress.    Eyes: Pupils equal and reactive. No scleral icterus.  Mild nystagmus appreciated on H test.     Throat: Clear no erythema or exudates noted.    Neck: Supple. No lymphadenopathy noted. Thyroid not enlarged.    Lungs: Clear to auscultation and percussion bilaterally.  Normal breathing effort    Cardiovascular: Regular rate and rhythm. No murmurs, rubs or gallops.    Abdomen:  Benign. No rebound or guarding noted.  Normal bowel sounds appreciated    Extremities: No clubbing, cyanosis, edema.  No pitting " edema bilaterally in lower extremities, 2+ distal peripheral pulses bilaterally    Skin: Clear. No rash or suspicious skin lesions noted.    Musculoskeletal: Some tenderness to palpation appreciated bilaterally and thoracic lumbar area to the left and right of spine    Orthostatics performed: Lying patient's blood pressure was 153/83 with a pulse of 60, standing patient's blood pressure was 114/80 with pulse of 78 for 1 minute, at 3 minutes standing patient's blood pressure was 140/93 with a pulse of 74.  Orthostatic test was positive for orthostatic hypotension.      Assessment and Plan    1. Chronic bilateral back pain, unspecified back location  Patient has had chronic bilateral back pain for a long time, but states there is been worsening in the past 2-3 weeks  -Counseled to continue home exercises that she is doing, she states that physical therapy has not helped her in the past so will not order physical therapy at this time  -Patient to continue pain clinic shots as needed, where she is she is already receiving this pain relief  -X-ray of the thoracic and lumbar spine ordered  -Patient advised she can increase her Tylenol 325 mg 2 tablets 4-6 hours as needed for a maximum of less than 4 g per 24 hours  -Follow-up x-ray of thoracic and lumbar spine at next office visit    2. Dizziness  Patient complains of dizziness for the past 2-3 weeks worsening while she walks around  -Initial suspicion was BPPV since some mild nystagmus was appreciated on physical exam  -However after orthostatics were performed in the clinic this is likely orthostatic hypotension (as orthostatics test was positive) due to her blood pressure medications: spironolactone doxazosin and valsartan  -Patient offered if cutting the spironolactone was something she would like to do since this can contribute to her hypotension, however patient wants to continue all 3 blood pressure medications at this time and will increase her fluid intake to try  and alleviate these dizziness and lightheadedness symptoms that she is experiencing  -We will continue to monitor at next follow-up visit and if patient's symptoms persist will consider cutting Spironolactone from her regimen      3. Screening for deficiency anemia  Patient's recent lab work was back in May 2018, patient complains of dizziness likely due to her orthostatic hypotension but will repeat CBC to rule out anemia etiology  -Labwork CBC ordered  -Follow-up CBC results at next visit      4. Burning with urination  Patient complains of some burning and difficulty on urination for the past 3 weeks  -Urinalysis ordered for patient to get done  -We will follow-up urinalysis results      Signed by: Mik Giang M.D.

## 2018-08-06 NOTE — PATIENT INSTRUCTIONS
-Please get lab work done  -Please get X Ray of thoracic and lumbar spine done  -You can continue to take your blood pressure medications as prescribed: spirinolactone, doxazosin, and valsartan, but please increase water (fluid) intake so the orthostatic symptoms of lightheadness and dizziness can improve   -You can increase Tylenol (325mg) 2 tablets to up to 4-6 hours as needed for pain relief ( maximum of  less than 4 grams in one day)      Orthostatic Hypotension  Orthostatic hypotension is a sudden drop in blood pressure that happens when you quickly change positions, such as when you get up from a seated or lying position. Blood pressure is a measurement of how strongly, or weakly, your blood is pressing against the walls of your arteries. Arteries are blood vessels that carry blood from your heart throughout your body. When blood pressure is too low, you may not get enough blood to your brain or to the rest of your organs. This can cause weakness, light-headedness, rapid heartbeat, and fainting. This can last for just a few seconds or for up to a few minutes.  Orthostatic hypotension is usually not a serious problem. However, if it happens frequently or gets worse, it may be a sign of something more serious.  What are the causes?  This condition may be caused by:  · Sudden changes in posture, such as standing up quickly after you have been sitting or lying down.  · Blood loss.  · Loss of body fluids (dehydration).  · Heart problems.  · Hormone (endocrine) problems.  · Pregnancy.  · Severe infection.  · Lack of certain nutrients.  · Severe allergic reactions (anaphylaxis).  · Certain medicines, such as blood pressure medicine or medicines that make the body lose excess fluids (diuretics). Sometimes, this condition can be caused by not taking medicine as directed, such as taking too much of a certain medicine.  What increases the risk?  Certain factors can make you more likely to develop orthostatic hypotension,  "including:  · Age. Risk increases as you get older.  · Conditions that affect the heart or the central nervous system.  · Taking certain medicines, such as blood pressure medicine or diuretics.  · Being pregnant.  What are the signs or symptoms?  Symptoms of this condition may include:  · Weakness.  · Light-headedness.  · Dizziness.  · Blurred vision.  · Fatigue.  · Rapid heartbeat.  · Fainting, in severe cases.  How is this diagnosed?  This condition is diagnosed based on:  · Your medical history.  · Your symptoms.  · Your blood pressure measurement. Your health care provider will check your blood pressure when you are:  ¨ Lying down.  ¨ Sitting.  ¨ Standing.  A blood pressure reading is recorded as two numbers, such as \"120 over 80\" (or 120/80). The first (\"top\") number is called the systolic pressure. It is a measure of the pressure in your arteries as your heart beats. The second (\"bottom\") number is called the diastolic pressure. It is a measure of the pressure in your arteries when your heart relaxes between beats. Blood pressure is measured in a unit called mm Hg. Healthy blood pressure for adults is 120/80. If your blood pressure is below 90/60, you may be diagnosed with hypotension.  Other information or tests that may be used to diagnose orthostatic hypotension include:  · Your other vital signs, such as your heart rate and temperature.  · Blood tests.  · Tilt table test. For this test, you will be safely secured to a table that moves you from a lying position to an upright position. Your heart rhythm and blood pressure will be monitored during the test.  How is this treated?  Treatment for this condition may include:  · Changing your diet. This may involve eating more salt (sodium) or drinking more water.  · Taking medicines to raise your blood pressure.  · Changing the dosage of certain medicines you are taking that might be lowering your blood pressure.  · Wearing compression stockings. These stockings " help to prevent blood clots and reduce swelling in your legs.  In some cases, you may need to go to the hospital for:  · Fluid replacement. This means you will receive fluids through an IV tube.  · Blood replacement. This means you will receive donated blood through an IV tube (transfusion).  · Treating an infection or heart problems, if this applies.  · Monitoring. You may need to be monitored while medicines that you are taking wear off.  Follow these instructions at home:  Eating and drinking  · Drink enough fluid to keep your urine clear or pale yellow.  · Eat a healthy diet and follow instructions from your health care provider about eating or drinking restrictions. A healthy diet includes:  ¨ Fresh fruits and vegetables.  ¨ Whole grains.  ¨ Lean meats.  ¨ Low-fat dairy products.  · Eat extra salt only as directed. Do not add extra salt to your diet unless your health care provider told you to do that.  · Eat frequent, small meals.  · Avoid standing up suddenly after eating.  Medicines  · Take over-the-counter and prescription medicines only as told by your health care provider.  ¨ Follow instructions from your health care provider about changing the dosage of your current medicines, if this applies.  ¨ Do not stop or adjust any of your medicines on your own.  General instructions  · Wear compression stockings as told by your health care provider.  · Get up slowly from lying down or sitting positions. This gives your blood pressure a chance to adjust.  · Avoid hot showers and excessive heat as directed by your health care provider.  · Return to your normal activities as told by your health care provider. Ask your health care provider what activities are safe for you.  · Do not use any products that contain nicotine or tobacco, such as cigarettes and e-cigarettes. If you need help quitting, ask your health care provider.  · Keep all follow-up visits as told by your health care provider. This is  important.  Contact a health care provider if:  · You vomit.  · You have diarrhea.  · You have a fever for more than 2-3 days.  · You feel more thirsty than usual.  · You feel weak and tired.  Get help right away if:  · You have chest pain.  · You have a fast or irregular heartbeat.  · You develop numbness in any part of your body.  · You cannot move your arms or your legs.  · You have trouble speaking.  · You become sweaty or feel lightheaded.  · You faint.  · You feel short of breath.  · You have trouble staying awake.  · You feel confused.  This information is not intended to replace advice given to you by your health care provider. Make sure you discuss any questions you have with your health care provider.  Document Released: 12/08/2003 Document Revised: 09/05/2017 Document Reviewed: 06/09/2017  [employer] Interactive Patient Education © 2017 [employer]

## 2018-08-08 ENCOUNTER — HOSPITAL ENCOUNTER (OUTPATIENT)
Dept: RADIOLOGY | Facility: MEDICAL CENTER | Age: 83
End: 2018-08-08
Attending: STUDENT IN AN ORGANIZED HEALTH CARE EDUCATION/TRAINING PROGRAM
Payer: MEDICARE

## 2018-08-08 DIAGNOSIS — G89.29 CHRONIC BILATERAL BACK PAIN, UNSPECIFIED BACK LOCATION: ICD-10-CM

## 2018-08-08 DIAGNOSIS — M54.9 CHRONIC BILATERAL BACK PAIN, UNSPECIFIED BACK LOCATION: ICD-10-CM

## 2018-08-08 PROCEDURE — 72100 X-RAY EXAM L-S SPINE 2/3 VWS: CPT

## 2018-08-08 PROCEDURE — 72072 X-RAY EXAM THORAC SPINE 3VWS: CPT

## 2018-08-09 ENCOUNTER — HOSPITAL ENCOUNTER (OUTPATIENT)
Dept: LAB | Facility: MEDICAL CENTER | Age: 83
End: 2018-08-09
Attending: STUDENT IN AN ORGANIZED HEALTH CARE EDUCATION/TRAINING PROGRAM
Payer: MEDICARE

## 2018-08-09 ENCOUNTER — TELEPHONE (OUTPATIENT)
Dept: INTERNAL MEDICINE | Facility: MEDICAL CENTER | Age: 83
End: 2018-08-09

## 2018-08-09 DIAGNOSIS — R30.0 BURNING WITH URINATION: ICD-10-CM

## 2018-08-09 DIAGNOSIS — N30.90 CYSTITIS: ICD-10-CM

## 2018-08-09 DIAGNOSIS — Z13.0 SCREENING FOR DEFICIENCY ANEMIA: ICD-10-CM

## 2018-08-09 DIAGNOSIS — R42 DIZZINESS: ICD-10-CM

## 2018-08-09 LAB
ALBUMIN SERPL BCP-MCNC: 4.4 G/DL (ref 3.2–4.9)
ALBUMIN/GLOB SERPL: 1.8 G/DL
ALP SERPL-CCNC: 57 U/L (ref 30–99)
ALT SERPL-CCNC: 12 U/L (ref 2–50)
ANION GAP SERPL CALC-SCNC: 9 MMOL/L (ref 0–11.9)
APPEARANCE UR: CLEAR
AST SERPL-CCNC: 19 U/L (ref 12–45)
BACTERIA #/AREA URNS HPF: NEGATIVE /HPF
BASOPHILS # BLD AUTO: 0.6 % (ref 0–1.8)
BASOPHILS # BLD: 0.04 K/UL (ref 0–0.12)
BILIRUB SERPL-MCNC: 0.6 MG/DL (ref 0.1–1.5)
BILIRUB UR QL STRIP.AUTO: NEGATIVE
BUN SERPL-MCNC: 38 MG/DL (ref 8–22)
CALCIUM SERPL-MCNC: 9.8 MG/DL (ref 8.5–10.5)
CHLORIDE SERPL-SCNC: 109 MMOL/L (ref 96–112)
CO2 SERPL-SCNC: 20 MMOL/L (ref 20–33)
COLOR UR: YELLOW
CREAT SERPL-MCNC: 1.19 MG/DL (ref 0.5–1.4)
EOSINOPHIL # BLD AUTO: 0.23 K/UL (ref 0–0.51)
EOSINOPHIL NFR BLD: 3.2 % (ref 0–6.9)
EPI CELLS #/AREA URNS HPF: ABNORMAL /HPF
ERYTHROCYTE [DISTWIDTH] IN BLOOD BY AUTOMATED COUNT: 48.1 FL (ref 35.9–50)
GLOBULIN SER CALC-MCNC: 2.4 G/DL (ref 1.9–3.5)
GLUCOSE SERPL-MCNC: 91 MG/DL (ref 65–99)
GLUCOSE UR STRIP.AUTO-MCNC: NEGATIVE MG/DL
HCT VFR BLD AUTO: 37.6 % (ref 37–47)
HGB BLD-MCNC: 12 G/DL (ref 12–16)
HYALINE CASTS #/AREA URNS LPF: ABNORMAL /LPF
IMM GRANULOCYTES # BLD AUTO: 0.04 K/UL (ref 0–0.11)
IMM GRANULOCYTES NFR BLD AUTO: 0.6 % (ref 0–0.9)
KETONES UR STRIP.AUTO-MCNC: NEGATIVE MG/DL
LEUKOCYTE ESTERASE UR QL STRIP.AUTO: ABNORMAL
LYMPHOCYTES # BLD AUTO: 1.95 K/UL (ref 1–4.8)
LYMPHOCYTES NFR BLD: 27.2 % (ref 22–41)
MCH RBC QN AUTO: 31 PG (ref 27–33)
MCHC RBC AUTO-ENTMCNC: 31.9 G/DL (ref 33.6–35)
MCV RBC AUTO: 97.2 FL (ref 81.4–97.8)
MICRO URNS: ABNORMAL
MONOCYTES # BLD AUTO: 0.72 K/UL (ref 0–0.85)
MONOCYTES NFR BLD AUTO: 10 % (ref 0–13.4)
NEUTROPHILS # BLD AUTO: 4.19 K/UL (ref 2–7.15)
NEUTROPHILS NFR BLD: 58.4 % (ref 44–72)
NITRITE UR QL STRIP.AUTO: NEGATIVE
NRBC # BLD AUTO: 0 K/UL
NRBC BLD-RTO: 0 /100 WBC
PH UR STRIP.AUTO: 5.5 [PH]
PLATELET # BLD AUTO: 135 K/UL (ref 164–446)
PMV BLD AUTO: 10.6 FL (ref 9–12.9)
POTASSIUM SERPL-SCNC: 4.4 MMOL/L (ref 3.6–5.5)
PROT SERPL-MCNC: 6.8 G/DL (ref 6–8.2)
PROT UR QL STRIP: NEGATIVE MG/DL
RBC # BLD AUTO: 3.87 M/UL (ref 4.2–5.4)
RBC # URNS HPF: ABNORMAL /HPF
RBC UR QL AUTO: NEGATIVE
SODIUM SERPL-SCNC: 138 MMOL/L (ref 135–145)
SP GR UR STRIP.AUTO: 1.02
UROBILINOGEN UR STRIP.AUTO-MCNC: 0.2 MG/DL
WBC # BLD AUTO: 7.2 K/UL (ref 4.8–10.8)
WBC #/AREA URNS HPF: ABNORMAL /HPF

## 2018-08-09 PROCEDURE — 36415 COLL VENOUS BLD VENIPUNCTURE: CPT

## 2018-08-09 PROCEDURE — 85025 COMPLETE CBC W/AUTO DIFF WBC: CPT

## 2018-08-09 PROCEDURE — 81001 URINALYSIS AUTO W/SCOPE: CPT

## 2018-08-09 PROCEDURE — 80053 COMPREHEN METABOLIC PANEL: CPT

## 2018-08-09 RX ORDER — SULFAMETHOXAZOLE AND TRIMETHOPRIM 800; 160 MG/1; MG/1
1 TABLET ORAL 2 TIMES DAILY
Qty: 10 TAB | Refills: 0 | Status: SHIPPED | OUTPATIENT
Start: 2018-08-09 | End: 2018-08-14

## 2018-08-09 NOTE — TELEPHONE ENCOUNTER
Please inform patient she has a urinary tract infection based on urinalysis results. I have ordered Bactrim antibiotic course for 5 days. Please let the patient know to pick this up from her pharmacy and take as instructed.

## 2018-08-14 ENCOUNTER — TELEPHONE (OUTPATIENT)
Dept: INTERNAL MEDICINE | Facility: MEDICAL CENTER | Age: 83
End: 2018-08-14

## 2018-08-15 NOTE — TELEPHONE ENCOUNTER
Spoke to patient over the phone around 6:20pm today on 8/14/18. Verbalized the results from the Thoracic spine Xray to patient and informed her that the results show no acute fracture and that there is some fusion of her spine bones which may be responsible for some of her symptoms. Patient was relieved to hear that there is no acute fracture and states she looks forward to her appointment tomorrow (8/15/18) for follow-up.

## 2018-08-15 NOTE — TELEPHONE ENCOUNTER
----- Message from Jaswinder Iqbal M.D. sent at 8/14/2018  8:22 AM PDT -----  Dr. Giang  This level of detail in the Xray report  is too much for an MA. The patient won't understand it and the MAs cannot explain it. Unrealistic to expect an MA to deliver this message. This requires a call from a physician.  ----- Message -----  From: Mik Giang M.D.  Sent: 8/9/2018   8:13 AM  To: Jaswinder Iqbal M.D., [facility]- [facility]    Xray of thoracic spine reviewed. 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 Results will be reviewed with patient at next office visit. If worsening changes or symptoms, please inform patient to come seek medical attention immediately.

## 2018-08-16 ENCOUNTER — HOME HEALTH ADMISSION (OUTPATIENT)
Dept: HOME HEALTH SERVICES | Facility: HOME HEALTHCARE | Age: 83
End: 2018-08-16
Payer: MEDICARE

## 2018-08-16 ENCOUNTER — OFFICE VISIT (OUTPATIENT)
Dept: INTERNAL MEDICINE | Facility: MEDICAL CENTER | Age: 83
End: 2018-08-16
Payer: MEDICARE

## 2018-08-16 ENCOUNTER — HOSPITAL ENCOUNTER (OUTPATIENT)
Dept: RADIOLOGY | Facility: MEDICAL CENTER | Age: 83
DRG: 684 | End: 2018-08-16
Attending: INTERNAL MEDICINE
Payer: MEDICARE

## 2018-08-16 VITALS
OXYGEN SATURATION: 94 % | TEMPERATURE: 98.4 F | BODY MASS INDEX: 23.68 KG/M2 | DIASTOLIC BLOOD PRESSURE: 60 MMHG | SYSTOLIC BLOOD PRESSURE: 100 MMHG | HEART RATE: 80 BPM | HEIGHT: 58 IN | WEIGHT: 112.8 LBS

## 2018-08-16 DIAGNOSIS — M19.019 PRIMARY OSTEOARTHRITIS OF SHOULDER, UNSPECIFIED LATERALITY: ICD-10-CM

## 2018-08-16 DIAGNOSIS — M54.2 CHRONIC NECK PAIN: ICD-10-CM

## 2018-08-16 DIAGNOSIS — G89.29 CHRONIC NECK PAIN: ICD-10-CM

## 2018-08-16 DIAGNOSIS — I10 ESSENTIAL HYPERTENSION, BENIGN: ICD-10-CM

## 2018-08-16 DIAGNOSIS — K59.00 CONSTIPATION, UNSPECIFIED CONSTIPATION TYPE: ICD-10-CM

## 2018-08-16 DIAGNOSIS — R42 VERTIGO: ICD-10-CM

## 2018-08-16 DIAGNOSIS — K40.90 INGUINAL HERNIA OF LEFT SIDE WITHOUT OBSTRUCTION OR GANGRENE: ICD-10-CM

## 2018-08-16 PROCEDURE — 99214 OFFICE O/P EST MOD 30 MIN: CPT | Performed by: INTERNAL MEDICINE

## 2018-08-16 PROCEDURE — 74019 RADEX ABDOMEN 2 VIEWS: CPT

## 2018-08-16 RX ORDER — DOCUSATE SODIUM 100 MG/1
100 CAPSULE, LIQUID FILLED ORAL 2 TIMES DAILY
Qty: 60 CAP | Refills: 1 | Status: SHIPPED | OUTPATIENT
Start: 2018-08-16 | End: 2018-08-17

## 2018-08-16 RX ORDER — LACTULOSE 10 G/15ML
SOLUTION ORAL
Qty: 1 BOTTLE | Refills: 0 | Status: SHIPPED
Start: 2018-08-16 | End: 2018-08-17

## 2018-08-16 NOTE — PATIENT INSTRUCTIONS
Use metamucil and colace as directed for constipation  You can use lactulose for severe constipation if other meds are ineffective  Get xray of abdomen  Set up apppointment with sweet water.

## 2018-08-17 ENCOUNTER — HOME CARE VISIT (OUTPATIENT)
Dept: HOME HEALTH SERVICES | Facility: HOME HEALTHCARE | Age: 83
End: 2018-08-17
Payer: MEDICARE

## 2018-08-17 ENCOUNTER — HOSPITAL ENCOUNTER (INPATIENT)
Facility: MEDICAL CENTER | Age: 83
LOS: 1 days | DRG: 684 | End: 2018-08-18
Attending: EMERGENCY MEDICINE | Admitting: HOSPITALIST
Payer: MEDICARE

## 2018-08-17 ENCOUNTER — HOME CARE VISIT (OUTPATIENT)
Dept: HOME HEALTH SERVICES | Facility: HOME HEALTHCARE | Age: 83
End: 2018-08-17

## 2018-08-17 ENCOUNTER — APPOINTMENT (OUTPATIENT)
Dept: RADIOLOGY | Facility: MEDICAL CENTER | Age: 83
DRG: 684 | End: 2018-08-17
Attending: EMERGENCY MEDICINE
Payer: MEDICARE

## 2018-08-17 DIAGNOSIS — N19 RENAL FAILURE, UNSPECIFIED CHRONICITY: ICD-10-CM

## 2018-08-17 DIAGNOSIS — K59.09 OTHER CONSTIPATION: ICD-10-CM

## 2018-08-17 PROBLEM — D64.9 ANEMIA: Status: ACTIVE | Noted: 2018-08-17

## 2018-08-17 PROBLEM — N17.9 AKI (ACUTE KIDNEY INJURY) (HCC): Status: ACTIVE | Noted: 2018-08-17

## 2018-08-17 LAB
ALBUMIN SERPL BCP-MCNC: 3.7 G/DL (ref 3.2–4.9)
ALBUMIN/GLOB SERPL: 1.4 G/DL
ALP SERPL-CCNC: 47 U/L (ref 30–99)
ALT SERPL-CCNC: 11 U/L (ref 2–50)
ANION GAP SERPL CALC-SCNC: 9 MMOL/L (ref 0–11.9)
AST SERPL-CCNC: 23 U/L (ref 12–45)
BASOPHILS # BLD AUTO: 0.4 % (ref 0–1.8)
BASOPHILS # BLD: 0.02 K/UL (ref 0–0.12)
BILIRUB SERPL-MCNC: 0.4 MG/DL (ref 0.1–1.5)
BUN SERPL-MCNC: 32 MG/DL (ref 8–22)
CALCIUM SERPL-MCNC: 9.7 MG/DL (ref 8.5–10.5)
CHLORIDE SERPL-SCNC: 108 MMOL/L (ref 96–112)
CO2 SERPL-SCNC: 18 MMOL/L (ref 20–33)
CREAT SERPL-MCNC: 1.8 MG/DL (ref 0.5–1.4)
EOSINOPHIL # BLD AUTO: 0.1 K/UL (ref 0–0.51)
EOSINOPHIL NFR BLD: 2 % (ref 0–6.9)
ERYTHROCYTE [DISTWIDTH] IN BLOOD BY AUTOMATED COUNT: 45.7 FL (ref 35.9–50)
FERRITIN SERPL-MCNC: 169.7 NG/ML (ref 10–291)
GLOBULIN SER CALC-MCNC: 2.6 G/DL (ref 1.9–3.5)
GLUCOSE SERPL-MCNC: 98 MG/DL (ref 65–99)
HCT VFR BLD AUTO: 32.8 % (ref 37–47)
HGB BLD-MCNC: 10.8 G/DL (ref 12–16)
IMM GRANULOCYTES # BLD AUTO: 0.04 K/UL (ref 0–0.11)
IMM GRANULOCYTES NFR BLD AUTO: 0.8 % (ref 0–0.9)
IRON SATN MFR SERPL: 27 % (ref 15–55)
IRON SERPL-MCNC: 70 UG/DL (ref 40–170)
LACTATE BLD-SCNC: 1 MMOL/L (ref 0.5–2)
LIPASE SERPL-CCNC: 190 U/L (ref 11–82)
LYMPHOCYTES # BLD AUTO: 1.03 K/UL (ref 1–4.8)
LYMPHOCYTES NFR BLD: 21.1 % (ref 22–41)
MAGNESIUM SERPL-MCNC: 1.8 MG/DL (ref 1.5–2.5)
MCH RBC QN AUTO: 31 PG (ref 27–33)
MCHC RBC AUTO-ENTMCNC: 32.9 G/DL (ref 33.6–35)
MCV RBC AUTO: 94.3 FL (ref 81.4–97.8)
MONOCYTES # BLD AUTO: 0.54 K/UL (ref 0–0.85)
MONOCYTES NFR BLD AUTO: 11 % (ref 0–13.4)
NEUTROPHILS # BLD AUTO: 3.16 K/UL (ref 2–7.15)
NEUTROPHILS NFR BLD: 64.7 % (ref 44–72)
NRBC # BLD AUTO: 0 K/UL
NRBC BLD-RTO: 0 /100 WBC
PLATELET # BLD AUTO: 117 K/UL (ref 164–446)
PMV BLD AUTO: 9.6 FL (ref 9–12.9)
POTASSIUM SERPL-SCNC: 4.7 MMOL/L (ref 3.6–5.5)
PROT SERPL-MCNC: 6.3 G/DL (ref 6–8.2)
RBC # BLD AUTO: 3.48 M/UL (ref 4.2–5.4)
SODIUM SERPL-SCNC: 135 MMOL/L (ref 135–145)
TIBC SERPL-MCNC: 259 UG/DL (ref 250–450)
WBC # BLD AUTO: 4.9 K/UL (ref 4.8–10.8)

## 2018-08-17 PROCEDURE — 83605 ASSAY OF LACTIC ACID: CPT

## 2018-08-17 PROCEDURE — 700105 HCHG RX REV CODE 258: Performed by: STUDENT IN AN ORGANIZED HEALTH CARE EDUCATION/TRAINING PROGRAM

## 2018-08-17 PROCEDURE — 83735 ASSAY OF MAGNESIUM: CPT

## 2018-08-17 PROCEDURE — 83690 ASSAY OF LIPASE: CPT

## 2018-08-17 PROCEDURE — 74176 CT ABD & PELVIS W/O CONTRAST: CPT

## 2018-08-17 PROCEDURE — 99223 1ST HOSP IP/OBS HIGH 75: CPT | Mod: GC | Performed by: HOSPITALIST

## 2018-08-17 PROCEDURE — 99285 EMERGENCY DEPT VISIT HI MDM: CPT

## 2018-08-17 PROCEDURE — 83540 ASSAY OF IRON: CPT

## 2018-08-17 PROCEDURE — 83550 IRON BINDING TEST: CPT

## 2018-08-17 PROCEDURE — A9270 NON-COVERED ITEM OR SERVICE: HCPCS | Performed by: STUDENT IN AN ORGANIZED HEALTH CARE EDUCATION/TRAINING PROGRAM

## 2018-08-17 PROCEDURE — 82728 ASSAY OF FERRITIN: CPT

## 2018-08-17 PROCEDURE — 700111 HCHG RX REV CODE 636 W/ 250 OVERRIDE (IP): Performed by: STUDENT IN AN ORGANIZED HEALTH CARE EDUCATION/TRAINING PROGRAM

## 2018-08-17 PROCEDURE — 85025 COMPLETE CBC W/AUTO DIFF WBC: CPT

## 2018-08-17 PROCEDURE — 770006 HCHG ROOM/CARE - MED/SURG/GYN SEMI*

## 2018-08-17 PROCEDURE — 700102 HCHG RX REV CODE 250 W/ 637 OVERRIDE(OP): Performed by: STUDENT IN AN ORGANIZED HEALTH CARE EDUCATION/TRAINING PROGRAM

## 2018-08-17 PROCEDURE — 96360 HYDRATION IV INFUSION INIT: CPT

## 2018-08-17 PROCEDURE — 80053 COMPREHEN METABOLIC PANEL: CPT

## 2018-08-17 RX ORDER — ANTIOX #8/OM3/DHA/EPA/LUT/ZEAX 250-2.5 MG
1 CAPSULE ORAL 2 TIMES DAILY
Status: DISCONTINUED | OUTPATIENT
Start: 2018-08-17 | End: 2018-08-17

## 2018-08-17 RX ORDER — AMOXICILLIN 250 MG
2 CAPSULE ORAL 2 TIMES DAILY
Status: DISCONTINUED | OUTPATIENT
Start: 2018-08-17 | End: 2018-08-18 | Stop reason: HOSPADM

## 2018-08-17 RX ORDER — PANTOPRAZOLE SODIUM 40 MG/1
40 TABLET, DELAYED RELEASE ORAL 2 TIMES DAILY
Status: DISCONTINUED | OUTPATIENT
Start: 2018-08-17 | End: 2018-08-17

## 2018-08-17 RX ORDER — SULFAMETHOXAZOLE AND TRIMETHOPRIM 800; 160 MG/1; MG/1
1 TABLET ORAL 2 TIMES DAILY
COMMUNITY
Start: 2018-08-09 | End: 2018-08-29

## 2018-08-17 RX ORDER — HEPARIN SODIUM 5000 [USP'U]/ML
5000 INJECTION, SOLUTION INTRAVENOUS; SUBCUTANEOUS EVERY 12 HOURS
Status: DISCONTINUED | OUTPATIENT
Start: 2018-08-17 | End: 2018-08-18 | Stop reason: HOSPADM

## 2018-08-17 RX ORDER — SODIUM CHLORIDE 9 MG/ML
1000 INJECTION, SOLUTION INTRAVENOUS ONCE
Status: COMPLETED | OUTPATIENT
Start: 2018-08-17 | End: 2018-08-17

## 2018-08-17 RX ORDER — SODIUM CHLORIDE 9 MG/ML
INJECTION, SOLUTION INTRAVENOUS CONTINUOUS
Status: DISCONTINUED | OUTPATIENT
Start: 2018-08-17 | End: 2018-08-18 | Stop reason: HOSPADM

## 2018-08-17 RX ORDER — POLYETHYLENE GLYCOL 3350 17 G/17G
1 POWDER, FOR SOLUTION ORAL
Status: DISCONTINUED | OUTPATIENT
Start: 2018-08-17 | End: 2018-08-18 | Stop reason: HOSPADM

## 2018-08-17 RX ORDER — ASPIRIN 81 MG/1
81 TABLET, CHEWABLE ORAL DAILY
Status: DISCONTINUED | OUTPATIENT
Start: 2018-08-18 | End: 2018-08-18 | Stop reason: HOSPADM

## 2018-08-17 RX ORDER — VALSARTAN 80 MG/1
320 TABLET ORAL DAILY
Status: DISCONTINUED | OUTPATIENT
Start: 2018-08-18 | End: 2018-08-17

## 2018-08-17 RX ORDER — SODIUM CHLORIDE 9 MG/ML
500 INJECTION, SOLUTION INTRAVENOUS ONCE
Status: DISCONTINUED | OUTPATIENT
Start: 2018-08-17 | End: 2018-08-17

## 2018-08-17 RX ORDER — SPIRONOLACTONE 25 MG/1
0.5 TABLET ORAL EVERY EVENING
Refills: 1 | COMMUNITY
Start: 2018-07-30 | End: 2018-08-29

## 2018-08-17 RX ORDER — PANTOPRAZOLE SODIUM 40 MG/1
40 TABLET, DELAYED RELEASE ORAL 2 TIMES DAILY
COMMUNITY
End: 2018-08-29

## 2018-08-17 RX ORDER — DOXAZOSIN 2 MG/1
4 TABLET ORAL EVERY EVENING
Status: DISCONTINUED | OUTPATIENT
Start: 2018-08-17 | End: 2018-08-18 | Stop reason: HOSPADM

## 2018-08-17 RX ORDER — DOXAZOSIN 2 MG/1
2 TABLET ORAL
COMMUNITY

## 2018-08-17 RX ORDER — PANTOPRAZOLE SODIUM 20 MG/1
20 TABLET, DELAYED RELEASE ORAL 2 TIMES DAILY
Refills: 1 | COMMUNITY
Start: 2018-07-10 | End: 2018-08-28 | Stop reason: SDUPTHER

## 2018-08-17 RX ORDER — BISACODYL 10 MG
10 SUPPOSITORY, RECTAL RECTAL
Status: DISCONTINUED | OUTPATIENT
Start: 2018-08-17 | End: 2018-08-18 | Stop reason: HOSPADM

## 2018-08-17 RX ORDER — POLYETHYLENE GLYCOL 3350 17 G/17G
1 POWDER, FOR SOLUTION ORAL DAILY
Status: DISCONTINUED | OUTPATIENT
Start: 2018-08-17 | End: 2018-08-18 | Stop reason: HOSPADM

## 2018-08-17 RX ORDER — ACETAMINOPHEN 325 MG/1
650 TABLET ORAL EVERY 6 HOURS PRN
Status: DISCONTINUED | OUTPATIENT
Start: 2018-08-17 | End: 2018-08-18 | Stop reason: HOSPADM

## 2018-08-17 RX ORDER — VALSARTAN 320 MG/1
320 TABLET ORAL EVERY MORNING
COMMUNITY
End: 2020-04-07 | Stop reason: SDUPTHER

## 2018-08-17 RX ADMIN — DOCUSATE SODIUM -SENNOSIDES 2 TABLET: 50; 8.6 TABLET, COATED ORAL at 16:56

## 2018-08-17 RX ADMIN — SODIUM CHLORIDE 1000 ML: 9 INJECTION, SOLUTION INTRAVENOUS at 15:15

## 2018-08-17 RX ADMIN — ACETAMINOPHEN 650 MG: 325 TABLET, FILM COATED ORAL at 16:56

## 2018-08-17 RX ADMIN — SODIUM CHLORIDE: 9 INJECTION, SOLUTION INTRAVENOUS at 16:06

## 2018-08-17 RX ADMIN — DOXAZOSIN 4 MG: 2 TABLET ORAL at 18:21

## 2018-08-17 RX ADMIN — HEPARIN SODIUM 5000 UNITS: 5000 INJECTION, SOLUTION INTRAVENOUS; SUBCUTANEOUS at 16:55

## 2018-08-17 RX ADMIN — POLYETHYLENE GLYCOL 3350 1 PACKET: 17 POWDER, FOR SOLUTION ORAL at 16:06

## 2018-08-17 RX ADMIN — SODIUM CHLORIDE: 9 INJECTION, SOLUTION INTRAVENOUS at 21:33

## 2018-08-17 SDOH — ECONOMIC STABILITY: HOUSING INSECURITY

## 2018-08-17 ASSESSMENT — COGNITIVE AND FUNCTIONAL STATUS - GENERAL
SUGGESTED CMS G CODE MODIFIER DAILY ACTIVITY: CH
DAILY ACTIVITIY SCORE: 24
SUGGESTED CMS G CODE MODIFIER MOBILITY: CH
MOBILITY SCORE: 24

## 2018-08-17 ASSESSMENT — ENCOUNTER SYMPTOMS
SEIZURES: 0
WHEEZING: 0
BACK PAIN: 1
DOUBLE VISION: 0
BRUISES/BLEEDS EASILY: 0
ABDOMINAL PAIN: 0
SPEECH CHANGE: 0
SHORTNESS OF BREATH: 0
FOCAL WEAKNESS: 0
PALPITATIONS: 0
PHOTOPHOBIA: 0
SINUS PAIN: 0
INSOMNIA: 0
WEIGHT LOSS: 0
FEVER: 0
WEAKNESS: 0
DIARRHEA: 0
HEARTBURN: 1
COUGH: 0
SORE THROAT: 0
CHILLS: 0
LOSS OF CONSCIOUSNESS: 0
SENSORY CHANGE: 0
MEMORY LOSS: 0
TINGLING: 0
BLOOD IN STOOL: 0
NECK PAIN: 0
MYALGIAS: 0
HEADACHES: 0
NAUSEA: 0
NECK PAIN: 1
VOMITING: 0
CONSTIPATION: 0
EYE PAIN: 0
DEPRESSION: 0
CONSTIPATION: 1
BACK PAIN: 0
NERVOUS/ANXIOUS: 0
ABDOMINAL PAIN: 1
BLURRED VISION: 0
DIZZINESS: 0
TREMORS: 0
NAUSEA: 1
FALLS: 0

## 2018-08-17 ASSESSMENT — LIFESTYLE VARIABLES
SUBSTANCE_ABUSE: 0
ALCOHOL_USE: NO
EVER_SMOKED: NEVER
ALCOHOL_USE: NO

## 2018-08-17 ASSESSMENT — COPD QUESTIONNAIRES
IN THE PAST 12 MONTHS DO YOU DO LESS THAN YOU USED TO BECAUSE OF YOUR BREATHING PROBLEMS: DISAGREE/UNSURE
DO YOU EVER COUGH UP ANY MUCUS OR PHLEGM?: NO/ONLY WITH OCCASIONAL COLDS OR INFECTIONS
COPD SCREENING SCORE: 2
HAVE YOU SMOKED AT LEAST 100 CIGARETTES IN YOUR ENTIRE LIFE: NO/DON'T KNOW
DURING THE PAST 4 WEEKS HOW MUCH DID YOU FEEL SHORT OF BREATH: NONE/LITTLE OF THE TIME

## 2018-08-17 ASSESSMENT — PAIN SCALES - GENERAL
PAINLEVEL_OUTOF10: 5
PAINLEVEL_OUTOF10: 0

## 2018-08-17 ASSESSMENT — PATIENT HEALTH QUESTIONNAIRE - PHQ9
SUM OF ALL RESPONSES TO PHQ9 QUESTIONS 1 AND 2: 0
1. LITTLE INTEREST OR PLEASURE IN DOING THINGS: NOT AT ALL
2. FEELING DOWN, DEPRESSED, IRRITABLE, OR HOPELESS: NOT AT ALL

## 2018-08-17 NOTE — NON-PROVIDER
"      Internal Medicine Medical Student Admitting History and Physical  Note Author: Scotty Coleamn    Name Destiny Burns     1930   Age/Sex 87 y.o. female   MRN [id number]   Code Status DNAR/DNI       Chief Complaint:  Hasn't had a bowel movement in 5 days    HPI:  Ms. Burns is an 87 year-old female who presented to the ED for constipation. The patient states that she has not had a bowel movement in almost five days. The patient reports that she was hospitalized for constipation 3-4 years ago as well. She went to see her PCP, Dr. Iqbal, yesterday, who ordered an abdominal X-ray and prescribed Miralax and Colace. She reports that Dr. Patterson called her today to inform her that he was concerned about a possible hernia, so she decided to come to the ED. She denies having taken the Miralax or Colace that she was prescribed. The patient reports bloating with mild abdominal discomfort. She states that she does not drink water very often because she \"doesn't like the taste\" and \"doesn't want to have to get up to go to the bathroom at night\". Her diet consists mainly of cold-cut and peanut butter and jelly sandwiches. The patient reports bloating with mild abdominal discomfort and nausea. She denies any vomiting, diarrhea, hematochezia, or melena. The patient reports that she was recently treated for a UTI with a course of TMP-SMX, which she finished yesterday. She denies any dysuria, frequency, urgency, or hematuria.      Review of Systems   Constitutional: Negative for chills, fever, malaise/fatigue and weight loss.   HENT: Negative for ear pain, hearing loss, nosebleeds, sinus pain, sore throat and tinnitus.    Eyes: Negative for blurred vision, double vision and photophobia.   Respiratory: Negative for cough, shortness of breath and wheezing.    Cardiovascular: Negative for chest pain, palpitations and leg swelling.   Gastrointestinal: Positive for constipation, heartburn and nausea. Negative for " abdominal pain, blood in stool, diarrhea, melena and vomiting.   Genitourinary: Negative for dysuria, frequency and urgency.   Musculoskeletal: Positive for joint pain (arthritis, multiple joints). Negative for back pain, falls, myalgias and neck pain.   Skin: Negative for rash.   Neurological: Negative for dizziness, tingling, tremors, sensory change, speech change, focal weakness, seizures, weakness and headaches.   Endo/Heme/Allergies: Does not bruise/bleed easily.   Psychiatric/Behavioral: Negative for depression and memory loss. The patient is not nervous/anxious and does not have insomnia.              Past Medical History (chronic problems, known complications, current management)     Past Medical History:   Diagnosis Date   • Chronic venous insufficiency 5/26/2016   • Hypertension 5/26/2016   • LLQ abdominal mass 5/26/2016   • Palpitations 5/26/2016   • WILLIAN (renal artery stenosis) (HCC) 5/26/2016     Medications:    Current Facility-Administered Medications:   •  [START ON 8/18/2018] aspirin (ASA) chewable tab 81 mg, 81 mg, Oral, DAILY, Gray Benson M.D.  •  doxazosin (CARDURA) tablet 4 mg, 4 mg, Oral, Q EVENING, Gray Benson M.D.  •  [START ON 8/18/2018] vitamin D (cholecalciferol) tablet 1,000 Units, 1,000 Units, Oral, DAILY, Gray Benson M.D.  •  senna-docusate (PERICOLACE or SENOKOT S) 8.6-50 MG per tablet 2 Tab, 2 Tab, Oral, BID, 2 Tab at 08/17/18 1656 **AND** polyethylene glycol/lytes (MIRALAX) PACKET 1 Packet, 1 Packet, Oral, QDAY PRN **AND** magnesium hydroxide (MILK OF MAGNESIA) suspension 30 mL, 30 mL, Oral, QDAY PRN **AND** bisacodyl (DULCOLAX) suppository 10 mg, 10 mg, Rectal, QDAY PRN, Gray Benson M.D.  •  NS infusion, , Intravenous, Continuous, Gray Benson M.D., Last Rate: 150 mL/hr at 08/17/18 1606  •  acetaminophen (TYLENOL) tablet 650 mg, 650 mg, Oral, Q6HRS PRN, Gray Benson M.D., 650 mg at 08/17/18 1659  •  polyethylene glycol/lytes (MIRALAX) PACKET 1 Packet, 1 Packet, Oral,  DAILY, Gray Benson M.D., 1 Packet at 08/17/18 1606  •  heparin injection 5,000 Units, 5,000 Units, Subcutaneous, Q12HRS, Gray Benson M.D., 5,000 Units at 08/17/18 1655      Past Surgical History:  Tonsillectomy  Cataract removal  Fusion C-Spine  Fracture Repair - Left Ankle    Medication Allergy/Sensitivities:  Acyclovir; Ciprofloxacin; Citalopram; Hydralazine hcl; Lyrica; Neurontin [gabapentin]; Norvasc [amlodipine]; Morphine; and Tramadol      Family History:  Family History   Problem Relation Age of Onset   • Cancer Mother         ORAL   • Cancer Unknown         AUNT   • Cancer Unknown         SKIN   • Diabetes Father        Social History:  Social History     Social History   • Marital status: Single     Spouse name: N/A   • Number of children: N/A   • Years of education: N/A     Occupational History   • Not on file.     Social History Main Topics   • Smoking status: Never Smoker   • Smokeless tobacco: Never Used   • Alcohol use No   • Drug use: No   • Sexual activity: Not on file     Other Topics Concern   • Not on file     Social History Narrative   • No narrative on file     Living situation: The patient currently lives by herself here in [place]. She is a retired .      Physical Exam     Vitals:    08/17/18 1138 08/17/18 1344 08/17/18 1400 08/17/18 1422   BP: (!) 162/71  153/65 138/57   Pulse: (!) 56  (!) 52 (!) 53   Resp: 18  18 18   Temp:       SpO2:   97%    Weight:  50.3 kg (111 lb)       Body mass index is 23.2 kg/m².  /57   Pulse (!) 53   Temp 36.8 °C (98.2 °F)   Resp 18   Wt 50.3 kg (111 lb)   SpO2 97%   BMI 23.20 kg/m²   O2 therapy: Pulse Oximetry: 97 %, O2 Delivery: None (Room Air)    Physical Exam   Constitutional: She is oriented to person, place, and time and well-developed, well-nourished, and in no distress.   HENT:   Head: Normocephalic and atraumatic.   Right Ear: External ear normal.   Left Ear: External ear normal.   Mouth/Throat: Oropharynx is clear  and moist.   Eyes: Pupils are equal, round, and reactive to light. Conjunctivae and EOM are normal. Right eye exhibits no discharge. Left eye exhibits no discharge. No scleral icterus.   Neck: Normal range of motion. Neck supple. No JVD present.   Cardiovascular: Normal rate, regular rhythm and intact distal pulses.    Murmur heard.   Systolic murmur is present with a grade of 3/6   Pulmonary/Chest: Effort normal and breath sounds normal. She has no wheezes. She has no rales. She exhibits no tenderness.   Abdominal: Soft. She exhibits no shifting dullness, no pulsatile liver, no abdominal bruit and no mass. Bowel sounds are hyperactive. There is no hepatosplenomegaly. There is tenderness in the right lower quadrant. There is no rigidity, no guarding and no CVA tenderness. No hernia.   Musculoskeletal: She exhibits no edema or tenderness.   Lymphadenopathy:     She has no cervical adenopathy.   Neurological: She is alert and oriented to person, place, and time. She displays no weakness and normal reflexes. No cranial nerve deficit. Coordination normal. GCS score is 15.   Skin: Skin is warm and dry. Bruising (R. shin) noted.   Psychiatric: Mood, memory, affect and judgment normal.     Labs:    CBC:  Component      Latest Ref Rng & Units 8/17/2018          10:27 AM   WBC      4.8 - 10.8 K/uL 4.9   RBC      4.20 - 5.40 M/uL 3.48 (L)   Hemoglobin      12.0 - 16.0 g/dL 10.8 (L)   Hematocrit      37.0 - 47.0 % 32.8 (L)   MCV      81.4 - 97.8 fL 94.3   MCH      27.0 - 33.0 pg 31.0   MCHC      33.6 - 35.0 g/dL 32.9 (L)   RDW      35.9 - 50.0 fL 45.7   Platelet Count      164 - 446 K/uL 117 (L)   MPV      9.0 - 12.9 fL 9.6   Neutrophils-Polys      44.00 - 72.00 % 64.70   Lymphocytes      22.00 - 41.00 % 21.10 (L)   Monocytes      0.00 - 13.40 % 11.00   Eosinophils      0.00 - 6.90 % 2.00   Basophils      0.00 - 1.80 % 0.40   Immature Granulocytes      0.00 - 0.90 % 0.80   Nucleated RBC      /100 WBC 0.00   Neutrophils  (Absolute)      2.00 - 7.15 K/uL 3.16   Lymphs (Absolute)      1.00 - 4.80 K/uL 1.03   Monos (Absolute)      0.00 - 0.85 K/uL 0.54   Eos (Absolute)      0.00 - 0.51 K/uL 0.10   Baso (Absolute)      0.00 - 0.12 K/uL 0.02   Immature Granulocytes (abs)      0.00 - 0.11 K/uL 0.04   NRBC (Absolute)      K/uL 0.00     CMP:  Component      Latest Ref Rng & Units 8/17/2018          10:27 AM   Sodium      135 - 145 mmol/L 135   Potassium      3.6 - 5.5 mmol/L 4.7   Chloride      96 - 112 mmol/L 108   Co2      20 - 33 mmol/L 18 (L)   Anion Gap      0.0 - 11.9 9.0   Glucose      65 - 99 mg/dL 98   Bun      8 - 22 mg/dL 32 (H)   Creatinine      0.50 - 1.40 mg/dL 1.80 (H)   Calcium      8.5 - 10.5 mg/dL 9.7   AST(SGOT)      12 - 45 U/L 23   ALT(SGPT)      2 - 50 U/L 11   Alkaline Phosphatase      30 - 99 U/L 47   Total Bilirubin      0.1 - 1.5 mg/dL 0.4   Albumin      3.2 - 4.9 g/dL 3.7   Total Protein      6.0 - 8.2 g/dL 6.3   Globulin      1.9 - 3.5 g/dL 2.6   A-G Ratio      g/dL 1.4     IRON PANEL:  Component      Latest Ref Rng & Units 8/17/2018 8/17/2018          10:27 AM 10:27 AM   Iron      40 - 170 ug/dL  70   Total Iron Binding      250 - 450 ug/dL  259   % Saturation      15 - 55 %  27   Ferritin      10.0 - 291.0 ng/mL 169.7      LIPASE, LACTIC ACID, MAGNESIUM:  Component      Latest Ref Rng & Units 8/17/2018 8/17/2018 8/17/2018          10:27 AM 10:27 AM 10:27 AM   Lipase      11 - 82 U/L  190 (H)    Lactic Acid      0.5 - 2.0 mmol/L 1.0     Magnesium      1.5 - 2.5 mg/dL   1.8       Imaging:    CT-ABDOMEN-PELVIS-W/O - 08/17/2018:  No evidence small bowel obstruction. Constipation pattern.  Appendix is not delineated.  Mild right pelvocaliectasis nonspecific. No left hydronephrosis. Ureters are not well delineated.  Atherosclerotic changes.  No free fluid.    [id number] VIEWS - 08/16/2018:  Constipation pattern.  Suspect left inguinal hernia containing bowel as demonstrated on the upright view.  No evidence of  small bowel obstruction.      Assessment/Plan     Anemia:  Assessment:  Patent presented with Hgb of 10.9; last Hgb on 08/09/2018 was 12.0.  She does not report any blood in her stool or urine.  Last colonoscopy 3 yrs ago was normal.  Patient deferred rectal exam.  Iron panel unremarkable  Plan:  Continue to monitor H&H    URDY (Acute Kidney Injury):  Assessment:  BUN and Cr elevated on admission. Possibly due to dehydration vs. Bactrim therapy.   The patient reports that she drinks approximately 20 mL of fluid per day.   Plan:  Give 1L NS bolus  Continue IV  mL/hr for fluid maintenance  Continue to monitor BUN and Cr  Counseled patient on drinking more water    Other Constipation:  Assessment:  Patient has a history of constipation that is likely secondary to dehydration.   She reports that she normally has a bowel movement every 2-3 days.  The patient reports drinking ~20 mL of fluid per day.  Plan:  Start Miralax and bowel protocol   Give 1L NS bolus  Continue IV  mL/hr maintenance fluids    Heart Murmur, Systolic:  Assessment:  Systolic murmur heard on admission.   Plan:  Patient is stable. No interventions necessary at this time.    Esophageal Reflux:  Assessment:  Patient has a history of J CARLOS that is treated with pantoprazole.  Plan:  Continue pantoprazole    Essential Hypertension, Benign:  Assessment:  Patient presented with HTN on admission. BP is now well controlled.  She is on valsartan 320 mg po daily for blood pressure control.  Plan:  Continue on valsartan        Scotty Coleman

## 2018-08-17 NOTE — H&P
Internal Medicine Admitting History and Physical    Note Author: Gray Besnon M.D.       Name Destiny Burns 1930   Age/Sex 87 y.o. female   MRN [id number]   Code Status DNR     After 5PM or if no immediate response to page, please call for cross-coverage  Attending/Team: Noelle/Emiliano See Patient List for primary contact information  Call [phone] to page    1st Call - Day Intern (R1):   Dr. Benson 2nd Call - Day Sr. Resident (R2/R3):   Dr. Harrison/ Dr. Amezcua       Chief Complaint:  Constipation    HPI:  Patient is an 87 year old woman with a history of HTN, GERD, and constipation who presented to the ED stating she has not had a bowel movement in 4 days. She normally has a bowel movement every 2-3 days. She states that yesterday she felt some bloating and pressure and went to her PCP who took an abdominal x-ray and recommended that she take stool softeners to help her. The patient has not taken any of this medication. She denies any abdominal surgeries in the past. She denies any abdominal pain. She states she has had no nausea/vomiting. She denies any weight loss or change in appetite. However, yesterday she felt distended and bloated and did not feel like eating. She stated that this has happened before. She normally drinks about 20oz of water/day.     In the ED she was  Found to have an RUDY. She had labs drawn last week on 2018 that showed a BUN and Cr of 38 and 1.19 respectively. Today they are higher at 32 and 1.8 respectively. She recently completed a course of Bactrim on 2018 for a UTI.     Review of Systems   Constitutional: Negative for chills, fever and weight loss.   HENT: Positive for hearing loss. Negative for congestion and ear pain.         Wears hearing aids.   Eyes: Negative for blurred vision, double vision and pain.   Respiratory: Negative for cough, shortness of breath and wheezing.    Cardiovascular: Positive for leg swelling. Negative for chest pain and  palpitations.        Chronic leg swelling due to venous insufficiency   Gastrointestinal: Positive for abdominal pain and heartburn. Negative for blood in stool, constipation, diarrhea, melena, nausea and vomiting.        Chronic heartburn.  Mild abdominal pressure with bloating for 1 day   Genitourinary: Negative for dysuria, hematuria and urgency.   Musculoskeletal: Positive for back pain, joint pain and neck pain. Negative for falls and myalgias.        Patient has arthritis and history of back surgeries   Skin: Negative for rash.   Neurological: Negative for dizziness, loss of consciousness and headaches.   Endo/Heme/Allergies: Positive for environmental allergies. Does not bruise/bleed easily.        Seasonal allergies   Psychiatric/Behavioral: Negative for depression, substance abuse and suicidal ideas.             Past Medical History:   Past Medical History:   Diagnosis Date   • Chronic venous insufficiency 5/26/2016   • Hypertension 5/26/2016   • LLQ abdominal mass 5/26/2016   • Palpitations 5/26/2016   • WILLIAN (renal artery stenosis) (HCC) 5/26/2016       Past Surgical History:  History reviewed. No pertinent surgical history.  Patient has history of multiple back surgeries.   She has no history of abdominal surgeries.     Current Outpatient Medications:  Home Medications     Reviewed by Gray Benson M.D. (Resident) on 08/17/18 at 1434  Med List Status: Complete   Medication Last Dose Status   aspirin (ASA) 81 MG CHEW chewable tablet 8/17/2018 Active   doxazosin (CARDURA) 2 MG Tab 8/16/2018 Active   Multiple Vitamins-Minerals (PRESERVISION AREDS 2) Cap 8/17/2018 Active   pantoprazole (PROTONIX) 40 MG Tablet Delayed Response 8/17/2018 Active   ranitidine (ZANTAC) 150 MG Tab 8/16/2018 Active   sulfamethoxazole-trimethoprim (BACTRIM DS) 800-160 MG tablet 8/16/2018 Active   valsartan (DIOVAN) 320 MG tablet 8/17/2018 Active   vitamin D (CHOLECALCIFEROL) 1000 UNIT Tab 8/17/2018 Active                Medication  Allergy/Sensitivities:  Allergies   Allergen Reactions   • Acyclovir    • Ciprofloxacin    • Citalopram    • Hydralazine Hcl    • Lyrica    • Neurontin [Gabapentin]    • Norvasc [Amlodipine]    • Morphine    • Tramadol      Drowsy, dizzy, depression         Family History:  Family History   Problem Relation Age of Onset   • Cancer Mother         ORAL   • Cancer Unknown         AUNT   • Cancer Unknown         SKIN   Her father had type 2 diabetes    Social History:  Social History     Social History   • Marital status: Single     Spouse name: N/A   • Number of children: N/A   • Years of education: N/A     Occupational History   • Not on file.     Social History Main Topics   • Smoking status: Never Smoker   • Smokeless tobacco: Never Used   • Alcohol use No   • Drug use: No   • Sexual activity: Not on file     Other Topics Concern   • Not on file     Social History Narrative   • No narrative on file     Living situation: Lives alone. She is a retired teacher.   PCP : Jaswinder Iqbal M.D.      Physical Exam     Vitals:    08/17/18 1138 08/17/18 1344 08/17/18 1400 08/17/18 1422   BP: (!) 162/71  153/65 138/57   Pulse: (!) 56  (!) 52 (!) 53   Resp: 18  18 18   Temp:       SpO2:   97%    Weight:  50.3 kg (111 lb)       Body mass index is 23.2 kg/m².  /57   Pulse (!) 53   Temp 36.8 °C (98.2 °F)   Resp 18   Wt 50.3 kg (111 lb)   SpO2 97%   BMI 23.20 kg/m²   O2 therapy: Pulse Oximetry: 97 %, O2 Delivery: None (Room Air)    Physical Exam   Constitutional: She is oriented to person, place, and time and well-developed, well-nourished, and in no distress. No distress.   HENT:   Head: Normocephalic and atraumatic.   Oropharynx dry   Eyes: Pupils are equal, round, and reactive to light. Conjunctivae and EOM are normal.   Neck:   Patient has scar along back from a spinal fusion   Cardiovascular: Normal rate and regular rhythm.  Exam reveals no gallop and no friction rub.    Murmur heard.  Systolic murmur    Pulmonary/Chest: Effort normal and breath sounds normal. No respiratory distress. She has no wheezes. She has no rales. She exhibits no tenderness.   Abdominal: Soft. Bowel sounds are normal. She exhibits distension. She exhibits no mass. There is no tenderness. There is no rebound and no guarding.   Genitourinary:   Genitourinary Comments: Patient deferred rectal   Musculoskeletal: Normal range of motion. She exhibits edema.   1+ pitting edema in lower extremities. Patient states this is normal.   Neurological: She is alert and oriented to person, place, and time. GCS score is 15.   Skin: Skin is warm and dry. She is not diaphoretic.   Nursing note and vitals reviewed.      Data Review       Old Records Request:   Deferred  Current Records review/summary: Completed    Lab Data Review:  Recent Results (from the past 24 hour(s))   CBC WITH DIFFERENTIAL    Collection Time: 08/17/18 10:27 AM   Result Value Ref Range    WBC 4.9 4.8 - 10.8 K/uL    RBC 3.48 (L) 4.20 - 5.40 M/uL    Hemoglobin 10.8 (L) 12.0 - 16.0 g/dL    Hematocrit 32.8 (L) 37.0 - 47.0 %    MCV 94.3 81.4 - 97.8 fL    MCH 31.0 27.0 - 33.0 pg    MCHC 32.9 (L) 33.6 - 35.0 g/dL    RDW 45.7 35.9 - 50.0 fL    Platelet Count 117 (L) 164 - 446 K/uL    MPV 9.6 9.0 - 12.9 fL    Neutrophils-Polys 64.70 44.00 - 72.00 %    Lymphocytes 21.10 (L) 22.00 - 41.00 %    Monocytes 11.00 0.00 - 13.40 %    Eosinophils 2.00 0.00 - 6.90 %    Basophils 0.40 0.00 - 1.80 %    Immature Granulocytes 0.80 0.00 - 0.90 %    Nucleated RBC 0.00 /100 WBC    Neutrophils (Absolute) 3.16 2.00 - 7.15 K/uL    Lymphs (Absolute) 1.03 1.00 - 4.80 K/uL    Monos (Absolute) 0.54 0.00 - 0.85 K/uL    Eos (Absolute) 0.10 0.00 - 0.51 K/uL    Baso (Absolute) 0.02 0.00 - 0.12 K/uL    Immature Granulocytes (abs) 0.04 0.00 - 0.11 K/uL    NRBC (Absolute) 0.00 K/uL   COMP METABOLIC PANEL    Collection Time: 08/17/18 10:27 AM   Result Value Ref Range    Sodium 135 135 - 145 mmol/L    Potassium 4.7 3.6 - 5.5  mmol/L    Chloride 108 96 - 112 mmol/L    Co2 18 (L) 20 - 33 mmol/L    Anion Gap 9.0 0.0 - 11.9    Glucose 98 65 - 99 mg/dL    Bun 32 (H) 8 - 22 mg/dL    Creatinine 1.80 (H) 0.50 - 1.40 mg/dL    Calcium 9.7 8.5 - 10.5 mg/dL    AST(SGOT) 23 12 - 45 U/L    ALT(SGPT) 11 2 - 50 U/L    Alkaline Phosphatase 47 30 - 99 U/L    Total Bilirubin 0.4 0.1 - 1.5 mg/dL    Albumin 3.7 3.2 - 4.9 g/dL    Total Protein 6.3 6.0 - 8.2 g/dL    Globulin 2.6 1.9 - 3.5 g/dL    A-G Ratio 1.4 g/dL   LIPASE    Collection Time: 08/17/18 10:27 AM   Result Value Ref Range    Lipase 190 (H) 11 - 82 U/L   LACTIC ACID    Collection Time: 08/17/18 10:27 AM   Result Value Ref Range    Lactic Acid 1.0 0.5 - 2.0 mmol/L   ESTIMATED GFR    Collection Time: 08/17/18 10:27 AM   Result Value Ref Range    GFR If  32 (A) >60 mL/min/1.73 m 2    GFR If Non  27 (A) >60 mL/min/1.73 m 2       Imaging/Procedures Review:    Independant Imaging Review: Completed  CT-ABDOMEN-PELVIS W/O   Final Result      No evidence small bowel obstruction. Constipation pattern.   Appendix is not delineated.   Mild right pelvocaliectasis nonspecific. No left hydronephrosis. Ureters are not well delineated.   Atherosclerotic changes.   No free fluid.        Records reviewed and summarized in current documentation :  Yes           Assessment/Plan     Anemia   Assessment & Plan    Patient came in with hemoglobin 10.9 which is decreased from 12 last week. She denies any blood in stool or urine. She does not appear to be actively bleeding. Patient states that she had a colonoscopy 3 years ago that was normal. Patient deferred rectal exam. MCV is normal.    Plan:  - Continue to monitor H&H.            RUDY (acute kidney injury) (HCC)   Assessment & Plan    Probably secondary to dehydration. Patient's BUN/Cr was ok a week ago. Patient states she has not been drinking enough water (she approximates 20 oz a day).     Plan:  - Bolus 1L ns  - Continue maintenance  fluids  - Avoid nephrotoxic medications  - Monitor kidney function via BMP tomorrow   - Counseled patient to drink more water            Other constipation   Assessment & Plan    Patient has a history of constipation. This is secondary to dehydration. She states that she has been drinking about 20 oz of fluid a day. She normally has a bowel movement every 2-3 days.     Plan:  - Start Miralax and bowel protocol.  - Bolus 1L of fluids  - Encouraged patient to drink water and foods that are high in fiber.         Heart murmur, systolic- (present on admission)   Assessment & Plan    Stable, will continue to monitor        Esophageal reflux- (present on admission)   Assessment & Plan    Patient has a history of GERD. Patient is currently on liquid diet.    - Continue home meds when patient advances to full diet.         Essential hypertension, benign- (present on admission)   Assessment & Plan    Patient presented with HTN. Blood pressures have been controlled.    - Continue home HTN medications.          Anticipated Hospital stay: Observation admit    Quality Measures  Quality-Core Measures   Reviewed items::  Labs reviewed, Medications reviewed and Radiology images reviewed  Tom catheter::  No Tom  DVT prophylaxis pharmacological::  Heparin    PCP: CATHY Pelaez M.D.    The patient was seen by me face to face. I personally had a discussion with the patient. The case was discussed with our resident team, I examined the patient and confirmed the essential components of the history, physical examination, diagnosis and treatment plan as needed. I agree with the patient care as documented by the resident and edited as above by me. See resident's note above for complete details of service. The overall treatment regimen will be carried out as described above.     Thank you:  Sterling Drake MD, FACP  [facility]  Pager: Use [employer] Text (use resident info under treatment team for  day to day floor issues)  Office: [phone] Ext. [phone]  Fax: [phone] (non PHI only)

## 2018-08-17 NOTE — CARE PLAN
Problem: Safety  Goal: Will remain free from injury  Outcome: PROGRESSING AS EXPECTED  Assisting pt up to bed, tolerates well      Problem: Bowel/Gastric:  Goal: Normal bowel function is maintained or improved  Outcome: PROGRESSING SLOWER THAN EXPECTED  Medication given per MAR

## 2018-08-17 NOTE — ASSESSMENT & PLAN NOTE
Patient has a history of constipation. This is likely secondary to dehydration. She states that she has been drinking about 20 oz of fluid a day. She normally has a bowel movement every 2-3 days. Patient had a large bowel movement overnight.    Plan:  - Patient is ok to discharge.   - Encouraged patient to drink water and foods that are high in fiber. Encouraged patient to take stool softeners.

## 2018-08-17 NOTE — ASSESSMENT & PLAN NOTE
Likely secondary to dehydration. Patient's BUN/Cr was ok a week ago. Patient states she has not been drinking enough water (she approximates 20 oz a day). Creatinine has improved this morning.    Plan:  - Counseled patient to drink more water  - Patient is ok to discharge.

## 2018-08-17 NOTE — ED PROVIDER NOTES
"ED Provider Note    Scribed for Mark Samuel M.D. by Williams Mcdonough. 8/17/2018, 9:39 AM.    Primary care provider: Jaswinder Iqbal M.D.  Means of arrival: Walk In  History obtained from: Patient  History limited by: None     CHIEF COMPLAINT  Chief Complaint   Patient presents with   • Constipation     4 days. Didnt try over the counter meds. called PCP and he told her to come ED.      HPI  Destiny Burns is a 87 y.o. female who presents to the Emergency Department for constipation.   The patient complains of constipation for the last 5 days. Her last bowel movement was 5 days ago. The patient complains episodes of abdominal bloating after eating. She describes \"three inches\" of abdominal bloating after eating. She complains of mild diffuse abdominal discomfort associated with her bloating. She describes her current symptoms as feeling similar to her history of constipation.   The patient presented to her PCP office yesterday, Dr. Iqbal, who ordered Abdominal X Ray and prescribed the patient Miralax and Colace for treatment.     The patient states she did not try to medicate her constipation with the medications prescribed yesterday by her PCP yet. The patient denies any alleviating factors of her constipation. The patient denies noticing any hard or firm abdominal masses associated with her abdominal pain.     The patient denies any hematochezia, nausea, vomiting, fever, dysuria, shortness of breath, diarrhea.       REVIEW OF SYSTEMS  Pertinent positives include constipation, abdominal bloating, abdominal discomfort0. Pertinent negatives include no hematochezia, nausea, vomiting, fever, dysuria, shortness of breath, diarrhea. As above, all other systems reviewed and are negative.   See HPI for further details.     PAST MEDICAL HISTORY   has a past medical history of Chronic venous insufficiency (5/26/2016); Hypertension (5/26/2016); LLQ abdominal mass (5/26/2016); Palpitations (5/26/2016); and WILLIAN " (renal artery stenosis) (HCC) (5/26/2016).    SURGICAL HISTORY  patient denies any surgical history    SOCIAL HISTORY  Social History   Substance Use Topics   • Smoking status: Never Smoker   • Smokeless tobacco: Never Used   • Alcohol use No      History   Drug Use No     FAMILY HISTORY  Family History   Problem Relation Age of Onset   • Cancer Mother         ORAL   • Cancer Unknown         AUNT   • Cancer Unknown         SKIN     CURRENT MEDICATIONS  Home Medications     Reviewed by Jane Marcial R.N. (Registered Nurse) on 08/17/18 at 0933  Med List Status: Partial   Medication Last Dose Status   acetaminophen (TYLENOL) 325 MG Tab 8/16/2018 Active   aspirin (ASA) 81 MG CHEW chewable tablet 8/16/2018 Active   docusate sodium (COLACE) 100 MG Cap  Active   doxazosin (CARDURA) 2 MG Tab 8/16/2018 Active   lactulose 10 GM/15ML Solution  Active   Multiple Vitamins-Minerals (PRESERVISION AREDS 2) Cap 8/16/2018 Active   pantoprazole (PROTONIX) 20 MG tablet 8/16/2018 Active   Polyethylene Glycol 3350 (MIRALAX PO) 8/16/2018 Active   psyllium (METAMUCIL) 58.12 % Pack  Active   ranitidine (ZANTAC) 150 MG Tab 8/16/2018 Active   simethicone (GAS-X) 80 MG CHEW 8/16/2018 Active   vitamin D (CHOLECALCIFEROL) 1000 UNIT Tab 8/16/2018 Active              ALLERGIES  Allergies   Allergen Reactions   • Acyclovir    • Ciprofloxacin    • Citalopram    • Hydralazine Hcl    • Lyrica    • Neurontin [Gabapentin]    • Norvasc [Amlodipine]    • Morphine    • Tramadol      Drowsy, dizzy, depression     PHYSICAL EXAM  VITAL SIGNS: /73   Pulse 78   Temp 36.8 °C (98.2 °F)   Resp 16   Wt 50.6 kg (111 lb 8.8 oz)   SpO2 96%   BMI 23.31 kg/m²   Vitals reviewed.  Constitutional: Alert in no apparent distress.  HENT: No signs of trauma, Bilateral external ears normal, Nose normal.   Eyes: Pupils are equal and reactive, Conjunctiva normal, Non-icteric.   Neck: Normal range of motion, No tenderness, Supple, No stridor.   Lymphatic: No  lymphadenopathy noted.   Cardiovascular: Regular rate and rhythm, no murmurs.   Thorax & Lungs: Normal breath sounds, No respiratory distress, No wheezing, No chest tenderness.   Abdomen: High pitched bowel sounds. Diffuse tenderness, No peritoneal signs. Soft, No peritoneal signs, On exam no reducible hernias are appreciated in left inguinal area.   Skin: Warm, Dry, No erythema, No rash.   Back: No bony tenderness, No CVA tenderness.   Extremities: Intact distal pulses, No edema, No tenderness, No cyanosis  Musculoskeletal: Good range of motion in all major joints. No tenderness to palpation or major deformities noted.   Neurologic: Alert , Normal motor function, Normal sensory function, No focal deficits noted.   Psychiatric: Affect normal, Judgment normal, Mood normal.       DIAGNOSTIC STUDIES / PROCEDURES    LABS  Labs Reviewed   CBC WITH DIFFERENTIAL - Abnormal; Notable for the following:        Result Value    RBC 3.48 (*)     Hemoglobin 10.8 (*)     Hematocrit 32.8 (*)     MCHC 32.9 (*)     Platelet Count 117 (*)     Lymphocytes 21.10 (*)     All other components within normal limits   COMP METABOLIC PANEL - Abnormal; Notable for the following:     Co2 18 (*)     Bun 32 (*)     Creatinine 1.80 (*)     All other components within normal limits   LIPASE - Abnormal; Notable for the following:     Lipase 190 (*)     All other components within normal limits   ESTIMATED GFR - Abnormal; Notable for the following:     GFR If  32 (*)     GFR If Non  27 (*)     All other components within normal limits   LACTIC ACID      All labs reviewed by me.    RADIOLOGY  CT-ABDOMEN-PELVIS W/O   Final Result      No evidence small bowel obstruction. Constipation pattern.   Appendix is not delineated.   Mild right pelvocaliectasis nonspecific. No left hydronephrosis. Ureters are not well delineated.   Atherosclerotic changes.   No free fluid.        The radiologist's interpretation of all  radiological studies have been reviewed by me.    COURSE & MEDICAL DECISION MAKING  Nursing notes, VS, PMSFHx reviewed in chart.  Differential diagnoses include but not limited to: Bowel obstruction, Incarcerated hernia.      Obtained and reviewed past medical records from 8/16/18 which indicated Abdominal X Ray was performed and showed Impression: Constipation pattern. Suspect left inguinal hernia containing bowel as demonstrated on the upright view. No evidence of small bowel obstruction.    9:39 AM Patient seen and examined at bedside. Ordered for CT Abdomen, GFR, lactic acid, CBC, CMP, lipase to evaluate.     11:12 AM Lab work is remarkable for elevated creatinine of 1.80. Therefore, CT was ordered without contrast.     12:02 PM Lab work reviewed and is remarkable for acute renal failure.     12:10 PM Spoke with Dr. Woodall, [facility], about the patient's condition.          Disposition:  Patient will be admitted to the hospital under the care of Dr. Woodall (Hospitalist) in guarded condition.     FINAL IMPRESSION  1. Other constipation    2. Renal failure, unspecified chronicity          Williams RIVERA (Luke), am scribing for, and in the presence of, Mark Samuel M.D..    Electronically signed by: Williams Mcdonough (Luke), 8/17/2018    Mark RIVERA M.D. personally performed the services described in this documentation, as scribed by Williams Mcdonough in my presence, and it is both accurate and complete.    The note accurately reflects work and decisions made by me.  Mark Samuel  8/17/2018  12:55 PM

## 2018-08-17 NOTE — ED TRIAGE NOTES
Chief Complaint   Patient presents with   • Constipation     4 days. Didnt try over the counter meds. called PCP and he told her to come ED.      Pt states mild nausea and cramping in abdomen

## 2018-08-17 NOTE — ED TRIAGE NOTES
Chief Complaint   Patient presents with   • Constipation     4 days. Didnt try over the counter meds. called PCP and he told her to come ED.

## 2018-08-17 NOTE — ASSESSMENT & PLAN NOTE
Patient has a history of GERD. Patient is currently on liquid diet.    - Continue home meds when patient advances to full diet.

## 2018-08-17 NOTE — ED NOTES
Med rec complete per patient with medication list at bedside (returned)   Allergies reviewed    Patient completed a 5 day Bactrim course 8-16-18

## 2018-08-17 NOTE — ASSESSMENT & PLAN NOTE
Patient presented with HTN. Blood pressures have been controlled.    - Continue home HTN medications.

## 2018-08-18 VITALS
OXYGEN SATURATION: 97 % | WEIGHT: 111.55 LBS | HEIGHT: 55 IN | BODY MASS INDEX: 25.82 KG/M2 | HEART RATE: 58 BPM | RESPIRATION RATE: 16 BRPM | SYSTOLIC BLOOD PRESSURE: 162 MMHG | TEMPERATURE: 98 F | DIASTOLIC BLOOD PRESSURE: 75 MMHG

## 2018-08-18 LAB
ANION GAP SERPL CALC-SCNC: 6 MMOL/L (ref 0–11.9)
BASOPHILS # BLD AUTO: 1 % (ref 0–1.8)
BASOPHILS # BLD: 0.04 K/UL (ref 0–0.12)
BUN SERPL-MCNC: 19 MG/DL (ref 8–22)
CALCIUM SERPL-MCNC: 8.6 MG/DL (ref 8.5–10.5)
CHLORIDE SERPL-SCNC: 113 MMOL/L (ref 96–112)
CO2 SERPL-SCNC: 18 MMOL/L (ref 20–33)
CREAT SERPL-MCNC: 0.99 MG/DL (ref 0.5–1.4)
EOSINOPHIL # BLD AUTO: 0.2 K/UL (ref 0–0.51)
EOSINOPHIL NFR BLD: 5 % (ref 0–6.9)
ERYTHROCYTE [DISTWIDTH] IN BLOOD BY AUTOMATED COUNT: 47.2 FL (ref 35.9–50)
GLUCOSE SERPL-MCNC: 83 MG/DL (ref 65–99)
HCT VFR BLD AUTO: 33.4 % (ref 37–47)
HGB BLD-MCNC: 10.7 G/DL (ref 12–16)
IMM GRANULOCYTES # BLD AUTO: 0.03 K/UL (ref 0–0.11)
IMM GRANULOCYTES NFR BLD AUTO: 0.7 % (ref 0–0.9)
LYMPHOCYTES # BLD AUTO: 1.32 K/UL (ref 1–4.8)
LYMPHOCYTES NFR BLD: 32.7 % (ref 22–41)
MCH RBC QN AUTO: 30.7 PG (ref 27–33)
MCHC RBC AUTO-ENTMCNC: 32 G/DL (ref 33.6–35)
MCV RBC AUTO: 96 FL (ref 81.4–97.8)
MONOCYTES # BLD AUTO: 0.37 K/UL (ref 0–0.85)
MONOCYTES NFR BLD AUTO: 9.2 % (ref 0–13.4)
NEUTROPHILS # BLD AUTO: 2.08 K/UL (ref 2–7.15)
NEUTROPHILS NFR BLD: 51.4 % (ref 44–72)
NRBC # BLD AUTO: 0 K/UL
NRBC BLD-RTO: 0 /100 WBC
PLATELET # BLD AUTO: 111 K/UL (ref 164–446)
PMV BLD AUTO: 9.4 FL (ref 9–12.9)
POTASSIUM SERPL-SCNC: 4.8 MMOL/L (ref 3.6–5.5)
RBC # BLD AUTO: 3.48 M/UL (ref 4.2–5.4)
SODIUM SERPL-SCNC: 137 MMOL/L (ref 135–145)
WBC # BLD AUTO: 4 K/UL (ref 4.8–10.8)

## 2018-08-18 PROCEDURE — 700111 HCHG RX REV CODE 636 W/ 250 OVERRIDE (IP): Performed by: STUDENT IN AN ORGANIZED HEALTH CARE EDUCATION/TRAINING PROGRAM

## 2018-08-18 PROCEDURE — A9270 NON-COVERED ITEM OR SERVICE: HCPCS | Performed by: STUDENT IN AN ORGANIZED HEALTH CARE EDUCATION/TRAINING PROGRAM

## 2018-08-18 PROCEDURE — 36415 COLL VENOUS BLD VENIPUNCTURE: CPT

## 2018-08-18 PROCEDURE — 85025 COMPLETE CBC W/AUTO DIFF WBC: CPT

## 2018-08-18 PROCEDURE — 700102 HCHG RX REV CODE 250 W/ 637 OVERRIDE(OP): Performed by: STUDENT IN AN ORGANIZED HEALTH CARE EDUCATION/TRAINING PROGRAM

## 2018-08-18 PROCEDURE — 80048 BASIC METABOLIC PNL TOTAL CA: CPT

## 2018-08-18 PROCEDURE — 99239 HOSP IP/OBS DSCHRG MGMT >30: CPT | Performed by: HOSPITALIST

## 2018-08-18 RX ADMIN — ASPIRIN 81 MG: 81 TABLET, CHEWABLE ORAL at 05:41

## 2018-08-18 RX ADMIN — VITAMIN D, TAB 1000IU (100/BT) 1000 UNITS: 25 TAB at 05:41

## 2018-08-18 RX ADMIN — POLYETHYLENE GLYCOL 3350 1 PACKET: 17 POWDER, FOR SOLUTION ORAL at 08:08

## 2018-08-18 ASSESSMENT — ENCOUNTER SYMPTOMS
NAUSEA: 0
BLURRED VISION: 0
FALLS: 0
VOMITING: 0
DOUBLE VISION: 0
CONSTIPATION: 0
COUGH: 0
FEVER: 0
DIZZINESS: 0
WEIGHT LOSS: 0
DIARRHEA: 0
BLOOD IN STOOL: 0
MYALGIAS: 0
LOSS OF CONSCIOUSNESS: 0
BRUISES/BLEEDS EASILY: 0
HEADACHES: 0
ABDOMINAL PAIN: 0
CHILLS: 0
SHORTNESS OF BREATH: 0
HEARTBURN: 0
PALPITATIONS: 0
DEPRESSION: 0

## 2018-08-18 ASSESSMENT — PAIN SCALES - GENERAL
PAINLEVEL_OUTOF10: 0
PAINLEVEL_OUTOF10: 0

## 2018-08-18 NOTE — CARE PLAN
Problem: Safety  Goal: Will remain free from injury  Bed alarm is on, safety teaching reinforced

## 2018-08-18 NOTE — PROGRESS NOTES
2 RN skin assessment complete with SANDRO Manriquez. Found:   No open wound noted, skin dry, fragile generally   Scab on mid posterior back     Bruising on both ULEs   Redness, blanching on R heel

## 2018-08-18 NOTE — DISCHARGE INSTRUCTIONS
Discharge Instructions    Discharged to home by car with relative. Discharged via wheelchair, hospital escort: Yes.  Special equipment needed: Not Applicable    Be sure to schedule a follow-up appointment with your primary care doctor or any specialists as instructed.     Discharge Plan:   Diet Plan: (P) Discussed  Activity Level: (P) Discussed  Confirmed Follow up Appointment: (P) Patient to Call and Schedule Appointment  Confirmed Symptoms Management: (P) Discussed  Medication Reconciliation Updated: (P) Yes  Influenza Vaccine Indication: Patient Refuses    I understand that a diet low in cholesterol, fat, and sodium is recommended for good health. Unless I have been given specific instructions below for another diet, I accept this instruction as my diet prescription.   Other diet: Regular    Special Instructions: None    · Is patient discharged on Warfarin / Coumadin?   No     Depression / Suicide Risk    As you are discharged from this [facility], it is important to learn how to keep safe from harming yourself.    Recognize the warning signs:  · Abrupt changes in personality, positive or negative- including increase in energy   · Giving away possessions  · Change in eating patterns- significant weight changes-  positive or negative  · Change in sleeping patterns- unable to sleep or sleeping all the time   · Unwillingness or inability to communicate  · Depression  · Unusual sadness, discouragement and loneliness  · Talk of wanting to die  · Neglect of personal appearance   · Rebelliousness- reckless behavior  · Withdrawal from people/activities they love  · Confusion- inability to concentrate     If you or a loved one observes any of these behaviors or has concerns about self-harm, here's what you can do:  · Talk about it- your feelings and reasons for harming yourself  · Remove any means that you might use to hurt yourself (examples: pills, rope, extension cords, firearm)  · Get professional help  from the community (Mental Health, Substance Abuse, psychological counseling)  · Do not be alone:Call your Safe Contact- someone whom you trust who will be there for you.  · Call your local CRISIS HOTLINE 110-0128 or 194-003-9514  · Call your local Children's Mobile Crisis Response Team [place] (678) 447-2133 or www.[employer]  · Call the toll free National Suicide Prevention Hotlines   · National Suicide Prevention Lifeline 727-105-RLXO (7744)  · [employer] Hope Line Network 800-SUICIDE (221-4147)        Constipation, Adult  Constipation is when a person:  · Poops (has a bowel movement) fewer times in a week than normal.  · Has a hard time pooping.  · Has poop that is dry, hard, or bigger than normal.  Follow these instructions at home:  Eating and drinking  · Eat foods that have a lot of fiber, such as:  ¨ Fresh fruits and vegetables.  ¨ Whole grains.  ¨ Beans.  · Eat less of foods that are high in fat, low in fiber, or overly processed, such as:  ¨ French fries.  ¨ Hamburgers.  ¨ Cookies.  ¨ Candy.  ¨ Soda.  · Drink enough fluid to keep your pee (urine) clear or pale yellow.  General instructions  · Exercise regularly or as told by your doctor.  · Go to the restroom when you feel like you need to poop. Do not hold it in.  · Take over-the-counter and prescription medicines only as told by your doctor. These include any fiber supplements.  · Do pelvic floor retraining exercises, such as:  ¨ Doing deep breathing while relaxing your lower belly (abdomen).  ¨ Relaxing your pelvic floor while pooping.  · Watch your condition for any changes.  · Keep all follow-up visits as told by your doctor. This is important.  Contact a doctor if:  · You have pain that gets worse.  · You have a fever.  · You have not pooped for 4 days.  · You throw up (vomit).  · You are not hungry.  · You lose weight.  · You are bleeding from the anus.  · You have thin, pencil-like poop (stool).  Get help right away if:  · You have a  fever, and your symptoms suddenly get worse.  · You leak poop or have blood in your poop.  · Your belly feels hard or bigger than normal (is bloated).  · You have very bad belly pain.  · You feel dizzy or you faint.  This information is not intended to replace advice given to you by your health care provider. Make sure you discuss any questions you have with your health care provider.  Document Released: 06/05/2009 Document Revised: 07/07/2017 Document Reviewed: 06/07/2017  [employer] Interactive Patient Education © 2017 [employer]      Acute Kidney Injury, Adult  Acute kidney injury (RUDY) occurs when there is sudden (acute) damage to the kidneys. A small amount of kidney damage may not cause problems, but a large amount of damage may make it difficult or impossible for the kidneys to work the way they should. RUDY may develop into long-lasting (chronic) kidney disease. Early detection and treatment of RUDY may prevent kidney damage from becoming permanent or getting worse.  What are the causes?  Common causes of this condition include:  · A problem with blood flow to the kidneys. This may be caused by:  ¨ Blood loss.  ¨ Heart and blood vessel (cardiovascular) disease.  ¨ Severe burns.  ¨ Liver disease.  · Direct damage to the kidneys. This may be caused by:  ¨ Some medicines.  ¨ A kidney infection.  ¨ Poisoning.  ¨ Being around or in contact with poisonous (toxic) substances.  ¨ A surgical wound.  ¨ A hard, direct force to the kidney area.  · A sudden blockage of urine flow. This may be caused by:  ¨ Cancer.  ¨ Kidney stones.  ¨ Enlarged prostate in males.  What are the signs or symptoms?  Symptoms develop slowly and may not be obvious until the kidney damage becomes severe. It is possible to have RUDY for years without showing any symptoms. Symptoms of this condition can include:  · Swelling (edema) of the face, legs, ankles, or feet.  · Numbness, tingling, or loss of feeling (sensation) in the hands or  feet.  · Tiredness (lethargy).  · Nausea or vomiting.  · Confusion or trouble concentrating.  · Problems with urination, such as:  ¨ Painful or burning feeling during urination.  ¨ Decreased urine production.  ¨ Frequent urination, especially at night.  ¨ Bloody urine.  · Muscle twitches and cramps, especially in the legs.  · Shortness of breath.  · Weakness.  · Constant itchiness.  · Loss of appetite.  · Metallic taste in the mouth.  · Trouble sleeping.  · Pale lining of the eyelids and surface of the eye (conjunctiva).  How is this diagnosed?  This condition may be diagnosed with various tests. Tests may include:  · Blood tests.  · Urine tests.  · Imaging tests.  · A test in which a sample of tissue is removed from the kidneys to be looked at under a microscope (kidney biopsy).  How is this treated?  Treatment of RUDY varies depending on the cause and severity of the kidney damage. In mild cases, treatment may not be needed. The kidneys may heal on their own.  If RUDY is more severe, your health care provider will treat the cause of the kidney damage, help the kidneys heal, and prevent problems from occurring. Severe cases may require a procedure to remove toxic wastes from the body (dialysis) or surgery to repair kidney damage. Surgery may involve:  · Repair of a torn kidney.  · Removal of a urine flow obstruction.  Follow these instructions at home:  · Follow your prescribed diet.  · Take over-the-counter and prescription medicines only as told by your health care provider.  ¨ Do not take any new medicines unless approved by your health care provider. Many medicines can worsen your kidney damage.  ¨ Do not take any vitamin and mineral supplements unless approved by your health care provider. Many nutritional supplements can worsen your kidney damage.  ¨ The dose of some medicines that you take may need to be adjusted.  · Do not use any tobacco products, such as cigarettes, chewing tobacco, and e-cigarettes. If you  need help quitting, ask your health care provider.  · Keep all follow-up visits as told by your health care provider. This is important.  · Keep track of your blood pressure. Report changes in your blood pressure as told by your health care provider.  · Achieve and maintain a healthy weight. If you need help with this, ask your health care provider.  · Start or continue an exercise plan. Try to exercise at least 30 minutes a day, 5 days a week.  · Stay current with immunizations as told by your health care provider.  Where to find more information:  · American Association of Kidney Patients: www.aakp.org  · National Kidney Foundation: www.kidney.org  · American Kidney Fund: www.akfinc.org  · Life Options Rehabilitation Program: www.lifeoptions.org and www.kidneyschool.org  Contact a health care provider if:  · Your symptoms get worse.  · You develop new symptoms.  Get help right away if:  · You develop symptoms of end-stage kidney disease, which include:  ¨ Headaches.  ¨ Abnormally dark or light skin.  ¨ Numbness in the hands or feet.  ¨ Easy bruising.  ¨ Frequent hiccups.  ¨ Chest pain.  ¨ Shortness of breath.  ¨ End of menstruation in women.  · You have a fever.  · You have decreased urine production.  · You have pain or bleeding when you urinate.  This information is not intended to replace advice given to you by your health care provider. Make sure you discuss any questions you have with your health care provider.  Document Released: 07/02/2012 Document Revised: 07/27/2017 Document Reviewed: 08/16/2013  [employer] Interactive Patient Education © 2017 [employer]

## 2018-08-18 NOTE — PROGRESS NOTES
Pt discharged home with her sister. IV was removed, tip was intact. Discharge paperwork discussed with the patient, all questions answered at this time. Pt went to lobby via wheelchair with nurse apprentice. All belongings collected and sent home with pt.

## 2018-08-18 NOTE — PROGRESS NOTES
Internal Medicine Interval Note  Note Author: Gray Benson M.D.     Name Destiny Burns 1930   Age/Sex 87 y.o. female   MRN [id number]   Code Status DNR     After 5PM or if no immediate response to page, please call for cross-coverage  Attending/Team: Noelle/Emiliano See Patient List for primary contact information  Call [phone] to page    1st Call - Day Intern (R1):   Dr. Benson 2nd Call - Day Sr. Resident (R2/R3):   Dr. Harrison         Reason for interval visit  (Principal Problem)   Constipation, RUDY    Interval Problem Daily Status Update  (24 hours)   Patient is doing well this morning. She had one large bowel movement and one smaller one overnight. Her kidney function improved today. She stated that distention has improved. She is feeling well and would like to go home. Gave her one more packet of Miralax. She stated she had enough stool softeners/motility agents at home since she saw her PCP two days ago.     Review of Systems   Constitutional: Negative for chills, fever and weight loss.   HENT: Positive for hearing loss. Negative for ear pain.         Wears hearing aids.   Eyes: Negative for blurred vision and double vision.        Wears glasses   Respiratory: Negative for cough and shortness of breath.    Cardiovascular: Negative for chest pain and palpitations.   Gastrointestinal: Negative for abdominal pain, blood in stool, constipation, diarrhea, heartburn, melena, nausea and vomiting.   Genitourinary: Negative for dysuria, frequency and urgency.   Musculoskeletal: Negative for falls and myalgias.   Skin: Negative for itching and rash.   Neurological: Negative for dizziness, loss of consciousness and headaches.   Endo/Heme/Allergies: Negative for environmental allergies. Does not bruise/bleed easily.   Psychiatric/Behavioral: Negative for depression and suicidal ideas.       Consultants/Specialty  PCP: Jaswinder Iqbal M.D.    Disposition  Patient can be  discharged.    Quality Measures  Quality-Core Measures   Reviewed items::  Labs reviewed and Medications reviewed  Tom catheter::  No Tom  DVT prophylaxis pharmacological::  Heparin          Physical Exam       Vitals:    08/17/18 1920 08/17/18 2015 08/18/18 0340 08/18/18 0700   BP: 117/70 136/66 159/76 (!) 162/75   Pulse: 60 78 60 (!) 58   Resp: 18 16 18 16   Temp: 36.3 °C (97.4 °F) 36.8 °C (98.2 °F) 36.3 °C (97.3 °F) 36.7 °C (98 °F)   SpO2: 98% 96% 96% 97%   Weight:  50.6 kg (111 lb 8.8 oz)     Height:         Body mass index is 25.82 kg/m². Weight: 50.6 kg (111 lb 8.8 oz)  Oxygen Therapy:  Pulse Oximetry: 97 %, O2 (LPM): 0, O2 Delivery: None (Room Air)    Physical Exam   Constitutional: She is oriented to person, place, and time and well-developed, well-nourished, and in no distress. No distress.   HENT:   Head: Normocephalic and atraumatic.   Eyes: Pupils are equal, round, and reactive to light. Conjunctivae and EOM are normal.   Neck: Normal range of motion.   Cardiovascular: Normal rate and regular rhythm.  Exam reveals no gallop and no friction rub.    Murmur heard.  Systolic murmur   Pulmonary/Chest: Effort normal and breath sounds normal. No respiratory distress. She has no wheezes. She has no rales.   Abdominal: Soft. Bowel sounds are normal. She exhibits no distension. There is no tenderness. There is no rebound and no guarding.   Musculoskeletal: She exhibits no edema.   Neurological: She is alert and oriented to person, place, and time. GCS score is 15.   Skin: Skin is warm and dry. She is not diaphoretic.         Lab Data Review:         8/18/2018  8:35 AM    Recent Labs      08/17/18   1027  08/18/18   0527   SODIUM  135  137   POTASSIUM  4.7  4.8   CHLORIDE  108  113*   CO2  18*  18*   BUN  32*  19   CREATININE  1.80*  0.99   MAGNESIUM  1.8   --    CALCIUM  9.7  8.6       Recent Labs      08/17/18   1027  08/18/18   0527   ALTSGPT  11   --    ASTSGOT  23   --    ALKPHOSPHAT  47   --    TBILIRUBIN   0.4   --    LIPASE  190*   --    GLUCOSE  98  83       Recent Labs      08/17/18   1027  08/18/18   0527   RBC  3.48*  3.48*   HEMOGLOBIN  10.8*  10.7*   HEMATOCRIT  32.8*  33.4*   PLATELETCT  117*  111*   IRON  70   --    FERRITIN  169.7   --    [facility]  259   --        Recent Labs      08/17/18   1027  08/18/18   0527   WBC  4.9  4.0*   NEUTSPOLYS  64.70  51.40   LYMPHOCYTES  21.10*  32.70   MONOCYTES  11.00  9.20   EOSINOPHILS  2.00  5.00   BASOPHILS  0.40  1.00   ASTSGOT  23   --    ALTSGPT  11   --    ALKPHOSPHAT  47   --    TBILIRUBIN  0.4   --            Assessment/Plan     Anemia   Assessment & Plan    Patient came in with hemoglobin 10.9 which is decreased from 12 last week. She denies any blood in stool or urine. She does not appear to be actively bleeding. Patient states that she had a colonoscopy 3 years ago that was normal. Patient deferred rectal exam. MCV is normal.    Plan:  - Follow up with PCP regarding anemia.             RUDY (acute kidney injury) (HCC)   Assessment & Plan    Likely secondary to dehydration. Patient's BUN/Cr was ok a week ago. Patient states she has not been drinking enough water (she approximates 20 oz a day). Creatinine has improved this morning.    Plan:  - Counseled patient to drink more water  - Patient is ok to discharge.           Other constipation   Assessment & Plan    Patient has a history of constipation. This is likely secondary to dehydration. She states that she has been drinking about 20 oz of fluid a day. She normally has a bowel movement every 2-3 days. Patient had a large bowel movement overnight.    Plan:  - Patient is ok to discharge.   - Encouraged patient to drink water and foods that are high in fiber. Encouraged patient to take stool softeners.        Heart murmur, systolic- (present on admission)   Assessment & Plan    Stable, will continue to monitor        Esophageal reflux- (present on admission)   Assessment & Plan    Patient has a history of GERD.  Patient is currently on liquid diet.    - Continue home meds when patient advances to full diet.         Essential hypertension, benign- (present on admission)   Assessment & Plan    Patient presented with HTN. Blood pressures have been controlled.    - Continue home HTN medications.          Gray Benson M.D.    The patient was seen by me face to face. I personally had a discussion with the patient. The case was discussed with our resident team, I examined the patient and confirmed the essential components of the history, physical examination, diagnosis and treatment plan as needed. I agree with the patient care as documented by the resident and edited as above by me. See resident's note above for complete details of service. The overall treatment regimen will be carried out as described above.     Thank you:  Sterling Drake MD, FACP  [facility]  Pager: Use [employer] Text (use resident info under treatment team for day to day floor issues)  Office: [phone] Ext. [phone]  Fax: [phone] (non PHI only)

## 2018-08-18 NOTE — PROGRESS NOTES
"Assumed care of pt. Pt is A&O x4. Pt reported to RN and MD that she had 2 BM's yesterday. Pt denies pain, reports feeling she is \"still backed up\", PRN medication administered. Pt calls for assistance appropriately. RN educated pt on importance of staying hydrated while at home, pt and sister verbalized understanding.   "

## 2018-08-18 NOTE — SENIOR ADMIT NOTE
Ms.Anna Still is a 87 year old female with past medical history significant for hypertension, GERD, fraility presents to the ED with c/o abdominal distension and not having a bowel movement for about 4 days. Reports that she has had a UTI last week for which she has been taking antibiotics, last day being yesterday. ROS positive for bloating and some nausea. Has previous similar episode in the past once, where she had to come to the hospital. Having a bowel movement every 2 days is normal for her. Reports poor water intake. Has not taken laxatives given by her PCP during her visit yesterday to the clinic for the same problem. Denies fever, chills, vomiting, abdominal pain, cough, chest pain, shortness of breath, urinary symptoms. Does not smoke, does not drink alcohol, does not use recreational drugs. Lives alone and is able to perform all her ADLs, occasionally uses a cane.    In the ED, patient's vitals were stable and she is no acute distress  HEENT : NC, AT, PERRLA  Heart/Lungs : S1 S2 heard, systolic murmur heard, CTAB, no wheezes  Abdomen : Distended, soft, non-tender, Bowel sounds hyperactive  Extremities : No edema or tenderness    Labs : Elevated BUN/Cr , elevated lipase, lactic acid 1.0    Imaging : Constipation pattern on CT abdomen.     Assessment :  - Severe constipation likely due to recent UTI vs. Poor fluid intake  - RUDY likely due to dehydration  - Poor PO intake    Plan :  - admit to observation  - abdominal exams every shift  - IV fluids with aggressive bowel regimen  - on clear liquid diet  - trend lipase to make sure its going down  - will need PO laxatives to use as needed at the time of discharge  - holding home valsartan due to RUDY  - on heparin for DVT prophylaxis  - patient is DNR    Please refer to 's H&P for further details.

## 2018-08-18 NOTE — DISCHARGE SUMMARY
Discharge Summary    CHIEF COMPLAINT ON ADMISSION  Chief Complaint   Patient presents with   • Constipation     4 days. Didnt try over the counter meds. called PCP and he told her to come ED.        Reason for Admission  Constipation     Admission Date  8/17/2018    CODE STATUS  DNAR/DNI    HPI & HOSPITAL COURSE  This is a 87 y.o. female here with constipation and RUDY. The patient has a history of HTN and chronic constipation. She does not take any stool softeners. She drinks about 20oz of water a day. She stated that she had not had a bowel movement in 3-4 days. She normally has one every 2-3 days. The patient was given fluids and miralax on admission. Overnight she had 1 large bowel movement and one smaller one. Her RUDY improved in the morning with her Cr at 0.99. She was given 1 more packet of miralax in the morning. She was counseled on the importance of getting enough fluid intake. She was encouraged to keep taking stool softeners at home. She stated that she had enough since her PCP gave her some when she saw him earlier this week.        Therefore, she is discharged in good and stable condition to home with close outpatient follow-up.    The patient recovered much more quickly than anticipated on admission.    Discharge Date  8/18/2018    FOLLOW UP ITEMS POST DISCHARGE  Follow up with PCP regarding anemia.    DISCHARGE DIAGNOSES  Active Problems:    Other constipation POA: Unknown    RUDY (acute kidney injury) (HCC) POA: Unknown    Anemia POA: Unknown    Essential hypertension, benign POA: Yes    Esophageal reflux POA: Yes    Heart murmur, systolic POA: Yes  Resolved Problems:    * No resolved hospital problems. *      FOLLOW UP  Future Appointments  Date Time Provider Department Center   8/29/2018 1:20 PM Jaswinder Iqbal M.D. [facility] None   10/29/2018 8:35 AM [facility] [address] [facility] [address]     No follow-up provider specified.    MEDICATIONS ON DISCHARGE     Medication List      CONTINUE taking these  medications      Instructions   aspirin 81 MG Chew chewable tablet  Commonly known as:  ASA   Take 81 mg by mouth every day.  Dose:  81 mg     doxazosin 2 MG Tabs  Commonly known as:  CARDURA   Take 4 mg by mouth every day.  Dose:  4 mg     * pantoprazole 40 MG Tbec  Commonly known as:  PROTONIX   Take 40 mg by mouth 2 times a day.  Dose:  40 mg     * pantoprazole 20 MG tablet  Commonly known as:  PROTONIX   Take 20 mg by mouth 2 Times a Day. TAKE ONE TABLET BY MOUTH TWICE A DAY  Dose:  20 mg     PRESERVISION AREDS 2 Caps   Take 1 Tab by mouth 2 Times a Day.  Dose:  1 Tab     raNITidine 150 MG Tabs  Commonly known as:  ZANTAC   TAKE ONE TABLET BY MOUTH ONCE DAILY     spironolactone 25 MG Tabs  Commonly known as:  ALDACTONE   Take 0.5 Tabs by mouth every evening.  Dose:  0.5 Tab     sulfamethoxazole-trimethoprim 800-160 MG tablet  Commonly known as:  BACTRIM DS   Take 1 Tab by mouth 2 times a day. 5 DAY COURSE  Dose:  1 Tab     valsartan 320 MG tablet  Commonly known as:  DIOVAN   Take 320 mg by mouth every day.  Dose:  320 mg     vitamin D 1000 UNIT Tabs  Commonly known as:  cholecalciferol   Take 1,000 Units by mouth every day.  Dose:  1000 Units        * This list has 2 medication(s) that are the same as other medications prescribed for you. Read the directions carefully, and ask your doctor or other care provider to review them with you.                Allergies  Allergies   Allergen Reactions   • Acyclovir    • Ciprofloxacin    • Citalopram    • Hydralazine Hcl    • Lyrica    • Neurontin [Gabapentin]    • Norvasc [Amlodipine]    • Morphine    • Tramadol      Drowsy, dizzy, depression       DIET  Orders Placed This Encounter   Procedures   • Diet Order Clear Liquids - No Red Foods     Standing Status:   Standing     Number of Occurrences:   1     Order Specific Question:   Diet:     Answer:   Clear Liquids - No Red Foods [12]       ACTIVITY  As tolerated.  Weight bearing as  tolerated    CONSULTATIONS  None    PROCEDURES  None    LABORATORY  Lab Results   Component Value Date    SODIUM 137 08/18/2018    POTASSIUM 4.8 08/18/2018    CHLORIDE 113 (H) 08/18/2018    CO2 18 (L) 08/18/2018    GLUCOSE 83 08/18/2018    BUN 19 08/18/2018    CREATININE 0.99 08/18/2018    CREATININE 1.0 03/11/2009        Lab Results   Component Value Date    WBC 4.0 (L) 08/18/2018    HEMOGLOBIN 10.7 (L) 08/18/2018    HEMATOCRIT 33.4 (L) 08/18/2018    PLATELETCT 111 (L) 08/18/2018        Total time of the discharge process exceeds 45 minutes.    Gray Benson M.D.    The patient was seen by me face to face. I personally had a discussion with the patient. The case was discussed with our resident team, I examined the patient and confirmed the essential components of the history, physical examination, diagnosis and treatment plan as needed. I agree with the patient care as documented by the resident and edited as above by me. See resident's note above for complete details of service. The overall treatment regimen will be carried out as described above.     Thank you:  Sterling Drake MD, FACP  [facility]  Pager: Use [employer] (use resident info under treatment team for day to day floor issues)  Office: [phone] Ext. [phone]  Fax: [phone] (non PHI only)

## 2018-08-19 ENCOUNTER — PATIENT OUTREACH (OUTPATIENT)
Dept: HEALTH INFORMATION MANAGEMENT | Facility: OTHER | Age: 83
End: 2018-08-19

## 2018-08-20 ENCOUNTER — TELEPHONE (OUTPATIENT)
Dept: INTERNAL MEDICINE | Facility: MEDICAL CENTER | Age: 83
End: 2018-08-20

## 2018-08-20 NOTE — TELEPHONE ENCOUNTER
1. Caller Name: Pt                      Call Back Number: [phone] (home)     2. Message: Patient called and left a message stating she needs a call from Dr. Iqbal when he has the chance.    3. Patient approves office to leave a detailed voicemail/MyChart message: N\A

## 2018-08-20 NOTE — TELEPHONE ENCOUNTER
1. Caller Name: Lorenza ([facility]  supervisor)                      Call Back Number: [phone]    2. Message: Lorenza called and left a message requesting a new referral to start services for pt for her hypertension. Due to the fact that she was admitted to hospital the day they were going to start services on her.    3. Patient approves office to leave a detailed voicemail/MyChart message: N\A

## 2018-08-21 NOTE — TELEPHONE ENCOUNTER
Called patient. Doing well. Taking miralax as directed but having bloating right after the dose  She had a large BM on Saturday  No abdominal pain and no nausea    Suggested she take 1/2 packet of Miralax bid for now. Continue with colace (liquid)

## 2018-08-28 RX ORDER — PANTOPRAZOLE SODIUM 20 MG/1
20 TABLET, DELAYED RELEASE ORAL 2 TIMES DAILY
Qty: 60 TAB | Refills: 0 | Status: SHIPPED | OUTPATIENT
Start: 2018-08-28 | End: 2018-09-20 | Stop reason: SDUPTHER

## 2018-08-28 NOTE — TELEPHONE ENCOUNTER
Last seen: 08/16/18 by Dr. Iqbal  Next appt: 08/29/18 with Dr. Iqbal    Was the patient seen in the last year in this department? Yes   Does patient have an active prescription for medications requested? No   Received Request Via: Pharmacy

## 2018-08-29 ENCOUNTER — OFFICE VISIT (OUTPATIENT)
Dept: INTERNAL MEDICINE | Facility: MEDICAL CENTER | Age: 83
End: 2018-08-29
Payer: MEDICARE

## 2018-08-29 VITALS
OXYGEN SATURATION: 97 % | WEIGHT: 116.4 LBS | HEART RATE: 75 BPM | BODY MASS INDEX: 24.43 KG/M2 | TEMPERATURE: 98.5 F | SYSTOLIC BLOOD PRESSURE: 142 MMHG | HEIGHT: 58 IN | DIASTOLIC BLOOD PRESSURE: 92 MMHG

## 2018-08-29 DIAGNOSIS — K59.09 OTHER CONSTIPATION: ICD-10-CM

## 2018-08-29 DIAGNOSIS — M50.90 CERVICAL DISC DISEASE: ICD-10-CM

## 2018-08-29 PROCEDURE — 99214 OFFICE O/P EST MOD 30 MIN: CPT | Performed by: INTERNAL MEDICINE

## 2018-08-29 RX ORDER — ACETAMINOPHEN 325 MG/1
650 TABLET ORAL EVERY 4 HOURS PRN
COMMUNITY
End: 2019-08-25

## 2018-08-29 RX ORDER — DOCUSATE SODIUM 100 MG/1
100 CAPSULE, LIQUID FILLED ORAL 2 TIMES DAILY
COMMUNITY
End: 2019-08-25

## 2018-08-29 RX ORDER — SIMETHICONE 80 MG
80 TABLET,CHEWABLE ORAL EVERY 6 HOURS PRN
COMMUNITY
End: 2019-08-25

## 2018-08-29 NOTE — PATIENT INSTRUCTIONS
Stop metamucil and switch to miralax one scoop of powder per day.  continue colace    Use gas x one pill per day    Use 2-3 milliliters of lactulose twice for constipation greater than 2 days. When bowel movement discontinue until you need it again.    Reduce pantroprazole to only one pill in the morning

## 2018-08-29 NOTE — PROGRESS NOTES
Established Patient    Ms. Lund a 87 y.o. female who presents today with:  CC: Follow-Up (constipation, hypertension)        Assessment and Plan    Constipation.-She is describing mostly distention and bloating causing abdominal discomfort.  Last bowel movement was 3 days ago.  Recent hospitalization CT scan was negative.  She had no obstruction or ileus.  She also did not have an inguinal hernia with bowel in it.  It does not appear that she has an ileus or small bowel obstruction.    She is compliant with Metamucil and Colace.  She prefers MiraLAX which is okay for her to take.  I suggested that she resume Gas-X 1 tablet daily.  At the last visit I gave her prescription for lactulose to use as needed for moderate to severe constipation.-I suggest that she begin to use that 2- 3 cc twice a day until she has a bowel movement and then discontinue until she needs that yet.  2.  Chronic neck pain-patient will follow up with the pain clinic.  3. GERD-stable patient concerned about kidney function therefore okay for patient to discontinue the afternoon dose of Protonix    Current Outpatient Prescriptions   Medication Sig Dispense Refill   • Polyethylene Glycol 3350 (MIRALAX PO) Take  by mouth.     • acetaminophen (TYLENOL) 325 MG Tab Take 650 mg by mouth every four hours as needed.     • docusate sodium (COLACE) 100 MG Cap Take 100 mg by mouth 2 times a day.     • simethicone (GAS-X) 80 MG Chew Tab Take 80 mg by mouth every 6 hours as needed for Flatulence.     • pantoprazole (PROTONIX) 20 MG tablet Take 1 Tab by mouth 2 Times a Day. 60 Tab 0   • valsartan (DIOVAN) 320 MG tablet Take 320 mg by mouth every day.     • doxazosin (CARDURA) 2 MG Tab Take 4 mg by mouth every day.     • ranitidine (ZANTAC) 150 MG Tab TAKE ONE TABLET BY MOUTH ONCE DAILY 90 Tab 2   • vitamin D (CHOLECALCIFEROL) 1000 UNIT Tab Take 1,000 Units by mouth every day.     • Multiple Vitamins-Minerals (PRESERVISION AREDS 2) Cap Take 1 Tab by mouth 2  Times a Day.     • aspirin (ASA) 81 MG CHEW chewable tablet Take 81 mg by mouth every day.     • pantoprazole (PROTONIX) 40 MG Tablet Delayed Response Take 40 mg by mouth 2 times a day.     • sulfamethoxazole-trimethoprim (BACTRIM DS) 800-160 MG tablet Take 1 Tab by mouth 2 times a day. 5 DAY COURSE     • spironolactone (ALDACTONE) 25 MG Tab Take 0.5 Tabs by mouth every evening.  1     No current facility-administered medications for this visit.          followup No Follow-up on file.    This note was created using voice recognition software (Dragon). The accuracy of the dictation is limited by the abilities of the software. I have reviewed the note prior to signing, however some errors in grammar and context are still possible. If you have any questions related to this note please do not hesitate to contact our office.   _______________________________________________________    HPI:   Ms. Burns is a pleasant 87-year-old woman with chronic constipation.  She is here for follow-up from a recent hospitalization for abdominal pain and constipation.  I performed an abdominal x-ray at the last visit due to complaints of no bowel movement for 3 or 4 days.  The x-ray was consistent with moderate constipation but also a question of a left inguinal hernia with bowel.  I called the patient the next day and she still did not have a bowel movement and I directed her to the emergency department.  She was admitted and treated.  She had a large bowel movement while there.  CT scan did not reveal significant abdominal pathology and there was no hernia.  She has no left inguinal swelling or pain.   She was discharged with Metamucil and Colace.  Feeling worse than when she was in the hospital. Pain in the right/middle  abdomen after meals. When she is sitting  And lying down the pain is minimal. At its worst the pain is 8/10. Strongly associated with meals either during or shortly after. No burping or flatulence.  Significant  "bloating . No vomiting. Mild nausea occasionally. BM on Saturday and Sunday. None in 3 days. After her BM her abdominal pain improved. She is taking Metamucil daily. One pack daily. She is taking colace mg twice per day. She did take Gas X twice per day for a short period of time several months ago which helped with gas production.  At this moment, she does not have abdominal pain but feels very bloated.  Taking protonix 20mg bid but worried about effect on her kidney.     We discussed nutrition at the last visit and I directed her to Meals on Wheels.  She currently lives alone.  She does not cook because she has a hard time reaching the counter top.  She hasnt set up meals on wheels.  I referred her to home health but because of the recent hospitalization she could not set it up.    She has chronic neck pain due to cervical DJD and DDD.  I referred her to pain clinic for injection therapy.  Needs another referral to [facility] because she didn't contact them       has a past medical history of Chronic venous insufficiency (5/26/2016); Hypertension (5/26/2016); LLQ abdominal mass (5/26/2016); Palpitations (5/26/2016); and WILLIAN (renal artery stenosis) (HCC) (5/26/2016).     reports that she has never smoked. She has never used smokeless tobacco. She reports that she does not drink alcohol or use drugs.      ROS: Pertinent positives as stated in HPI, all others reviewed as negative:  Constitutional ROS: No unexpected change in weight, No weakness, No fatigue, No unexplained fevers, sweats, or chills  Gastrointestinal ROS: Positive for constipation significant bloating, heartburn       Physical Exam  /92   Pulse 75   Temp 36.9 °C (98.5 °F)   Ht 1.473 m (4' 10\")   Wt 52.8 kg (116 lb 6.4 oz)   SpO2 97%   BMI 24.33 kg/m²   Constitutional:  oriented to person, place, and time. No distress.   Eyes: Pupils are equal, round, and reactive to light. No scleral icterus.   Neck: Neck supple. No thyromegaly present. "   Cardiovascular: Normal rate, regular rhythm and normal heart sounds.  Exam reveals no gallop and no friction rub.    No murmur heard.  Pulmonary/Chest: Breath sounds normal. Chest wall is not dull to percussion.   Musculoskeletal:   no edema.   Lymphadenopathy: no cervical adenopathy  Neurological: alert and oriented to person, place, and time.   Skin: No cyanosis. Nails show no clubbing.  Other: Abdomen: Significant distention of the abdomen but without pain.  Positive bowel sounds in all 4.  No masses.        Current Outpatient Prescriptions on File Prior to Visit   Medication Sig Dispense Refill   • pantoprazole (PROTONIX) 20 MG tablet Take 1 Tab by mouth 2 Times a Day. 60 Tab 0   • valsartan (DIOVAN) 320 MG tablet Take 320 mg by mouth every day.     • doxazosin (CARDURA) 2 MG Tab Take 4 mg by mouth every day.     • ranitidine (ZANTAC) 150 MG Tab TAKE ONE TABLET BY MOUTH ONCE DAILY 90 Tab 2   • vitamin D (CHOLECALCIFEROL) 1000 UNIT Tab Take 1,000 Units by mouth every day.     • Multiple Vitamins-Minerals (PRESERVISION AREDS 2) Cap Take 1 Tab by mouth 2 Times a Day.     • aspirin (ASA) 81 MG CHEW chewable tablet Take 81 mg by mouth every day.     • pantoprazole (PROTONIX) 40 MG Tablet Delayed Response Take 40 mg by mouth 2 times a day.     • sulfamethoxazole-trimethoprim (BACTRIM DS) 800-160 MG tablet Take 1 Tab by mouth 2 times a day. 5 DAY COURSE     • spironolactone (ALDACTONE) 25 MG Tab Take 0.5 Tabs by mouth every evening.  1     No current facility-administered medications on file prior to visit.            Signed by: Jaswinder Iqbal M.D.

## 2018-09-18 ENCOUNTER — OFFICE VISIT (OUTPATIENT)
Dept: INTERNAL MEDICINE | Facility: MEDICAL CENTER | Age: 83
End: 2018-09-18
Payer: MEDICARE

## 2018-09-18 VITALS
SYSTOLIC BLOOD PRESSURE: 140 MMHG | TEMPERATURE: 98.3 F | OXYGEN SATURATION: 95 % | DIASTOLIC BLOOD PRESSURE: 80 MMHG | BODY MASS INDEX: 24.9 KG/M2 | HEIGHT: 58 IN | HEART RATE: 74 BPM | WEIGHT: 118.6 LBS

## 2018-09-18 DIAGNOSIS — R60.0 BILATERAL LEG EDEMA: ICD-10-CM

## 2018-09-18 PROCEDURE — 99213 OFFICE O/P EST LOW 20 MIN: CPT | Performed by: INTERNAL MEDICINE

## 2018-09-18 NOTE — TELEPHONE ENCOUNTER
Last seen: 08/29/18 by Dr. Iqbal  Next appt: 09/18/18 with Dr. Iqbal    Was the patient seen in the last year in this department? Yes   Does patient have an active prescription for medications requested? No   Received Request Via: Pharmacy

## 2018-09-18 NOTE — PROGRESS NOTES
Established Patient    Ms. Lund a 87 y.o. female who presents today with:  CC: Follow-Up (constipation); Depression (feeling a little down); and Water Retention (feels like retaining water again)        Assessment and Plan    Bilateral leg swelling.-Significant nonpitting leg edema over the last 2 weeks that does not improve after legs are elevated. No obvious evidence of venous insufficiency. Unlikely lymphatic or IVC obstruction. Right ventricular pressures on echocardiogram 2016 60 mmHg. Currently no respiratory symptoms. No evidence of congestive heart failure. No prior history of cirrhosis. Patient will continue with compression hoses. I will check a BNP, CBC creatinine LFTs, urine protein, chest and abdomen x-ray and TSH. Patient may require echocardiogram CT of the abdomen and pelvis.  Current Outpatient Prescriptions   Medication Sig Dispense Refill   • Polyethylene Glycol 3350 (MIRALAX PO) Take  by mouth.     • acetaminophen (TYLENOL) 325 MG Tab Take 650 mg by mouth every four hours as needed.     • docusate sodium (COLACE) 100 MG Cap Take 100 mg by mouth 2 times a day.     • simethicone (GAS-X) 80 MG Chew Tab Take 80 mg by mouth every 6 hours as needed for Flatulence.     • pantoprazole (PROTONIX) 20 MG tablet Take 1 Tab by mouth 2 Times a Day. 60 Tab 0   • valsartan (DIOVAN) 320 MG tablet Take 320 mg by mouth every day.     • doxazosin (CARDURA) 2 MG Tab Take 4 mg by mouth every day.     • ranitidine (ZANTAC) 150 MG Tab TAKE ONE TABLET BY MOUTH ONCE DAILY 90 Tab 2   • vitamin D (CHOLECALCIFEROL) 1000 UNIT Tab Take 1,000 Units by mouth every day.     • Multiple Vitamins-Minerals (PRESERVISION AREDS 2) Cap Take 1 Tab by mouth 2 Times a Day.     • aspirin (ASA) 81 MG CHEW chewable tablet Take 81 mg by mouth every day.       No current facility-administered medications for this visit.          followup No Follow-up on file.    This note was created using voice recognition software (Dragon). The accuracy of  "the dictation is limited by the abilities of the software. I have reviewed the note prior to signing, however some errors in grammar and context are still possible. If you have any questions related to this note please do not hesitate to contact our office.   _______________________________________________________    HPI:   She is here with complaints of leg swelling. She is an 87-year-old woman with a history of essential hypertension constipation leg cramps and osteoarthritis. Legs are swelling bilaterally to the thighs for 2 weeks. Swelling is not better in the morning. No orthopnea. No SOB. No cough. Weight gain overtime. (7lbs in 2months). Urinating more she thinks. No abdominal pain, currently no abdominal distention although she's had distention with constipation in the past.. No specific calf pain, redness or cramping currently.. No rashes, hives or wheals. No prior history of significant venous insufficiency.  Echocardiogram 2016 with normal left ventricular systolic function and EF of 65%, grade 1 diastolic dysfunction with right ventricular function normal. Right ventricular systolic pressure 60.   has a past medical history of Chronic venous insufficiency (5/26/2016); Hypertension (5/26/2016); LLQ abdominal mass (5/26/2016); Palpitations (5/26/2016); and WILLIAN (renal artery stenosis) ([facility]) (5/26/2016).     reports that she has never smoked. She has never used smokeless tobacco. She reports that she does not drink alcohol or use drugs.      ROS: Pertinent positives as stated in HPI, all others reviewed as negative:  Constitutional ROS: No unexpected change in weight, No fatigue      Physical Exam  /80   Pulse 74   Temp 36.8 °C (98.3 °F)   Ht 1.473 m (4' 10\")   Wt 53.8 kg (118 lb 9.6 oz)   SpO2 95%   BMI 24.79 kg/m²   Constitutional:  oriented to person, place, and time. No distress.   Eyes: Pupils are equal, round, and reactive to light. No scleral icterus.   Neck: Neck supple. No thyromegaly " present.   Cardiovascular: Normal rate, regular rhythm and normal heart sounds.  Exam reveals no gallop and no friction rub.    No murmur heard. No S3 or JVD  Pulmonary/Chest: Breath sounds normal. Chest wall is not dull to percussion. No crackles  Musculoskeletal: Nonpitting edema to the proximal legs. No obvious varicose veins, no pitting no ulcerations.  Lymphadenopathy: no cervical adenopathy no inguinal lymphadenopathy          Current Outpatient Prescriptions on File Prior to Visit   Medication Sig Dispense Refill   • Polyethylene Glycol 3350 (MIRALAX PO) Take  by mouth.     • acetaminophen (TYLENOL) 325 MG Tab Take 650 mg by mouth every four hours as needed.     • docusate sodium (COLACE) 100 MG Cap Take 100 mg by mouth 2 times a day.     • simethicone (GAS-X) 80 MG Chew Tab Take 80 mg by mouth every 6 hours as needed for Flatulence.     • pantoprazole (PROTONIX) 20 MG tablet Take 1 Tab by mouth 2 Times a Day. 60 Tab 0   • valsartan (DIOVAN) 320 MG tablet Take 320 mg by mouth every day.     • doxazosin (CARDURA) 2 MG Tab Take 4 mg by mouth every day.     • ranitidine (ZANTAC) 150 MG Tab TAKE ONE TABLET BY MOUTH ONCE DAILY 90 Tab 2   • vitamin D (CHOLECALCIFEROL) 1000 UNIT Tab Take 1,000 Units by mouth every day.     • Multiple Vitamins-Minerals (PRESERVISION AREDS 2) Cap Take 1 Tab by mouth 2 Times a Day.     • aspirin (ASA) 81 MG CHEW chewable tablet Take 81 mg by mouth every day.       No current facility-administered medications on file prior to visit.            Signed by: Jaswinder Iqbal M.D.

## 2018-09-19 ENCOUNTER — HOSPITAL ENCOUNTER (OUTPATIENT)
Dept: LAB | Facility: MEDICAL CENTER | Age: 83
End: 2018-09-19
Attending: INTERNAL MEDICINE
Payer: MEDICARE

## 2018-09-19 ENCOUNTER — HOSPITAL ENCOUNTER (OUTPATIENT)
Dept: RADIOLOGY | Facility: MEDICAL CENTER | Age: 83
End: 2018-09-19
Attending: INTERNAL MEDICINE
Payer: MEDICARE

## 2018-09-19 DIAGNOSIS — R60.0 BILATERAL LEG EDEMA: ICD-10-CM

## 2018-09-19 LAB
ALBUMIN SERPL BCP-MCNC: 4 G/DL (ref 3.2–4.9)
ALBUMIN/GLOB SERPL: 1.6 G/DL
ALP SERPL-CCNC: 75 U/L (ref 30–99)
ALT SERPL-CCNC: 11 U/L (ref 2–50)
ANION GAP SERPL CALC-SCNC: 9 MMOL/L (ref 0–11.9)
APPEARANCE UR: CLEAR
AST SERPL-CCNC: 20 U/L (ref 12–45)
BACTERIA #/AREA URNS HPF: NEGATIVE /HPF
BASOPHILS # BLD AUTO: 0.5 % (ref 0–1.8)
BASOPHILS # BLD: 0.03 K/UL (ref 0–0.12)
BILIRUB SERPL-MCNC: 0.5 MG/DL (ref 0.1–1.5)
BILIRUB UR QL STRIP.AUTO: NEGATIVE
BNP SERPL-MCNC: 154 PG/ML (ref 0–100)
BUN SERPL-MCNC: 17 MG/DL (ref 8–22)
CALCIUM SERPL-MCNC: 9.8 MG/DL (ref 8.5–10.5)
CHLORIDE SERPL-SCNC: 104 MMOL/L (ref 96–112)
CO2 SERPL-SCNC: 24 MMOL/L (ref 20–33)
COLOR UR: YELLOW
CREAT SERPL-MCNC: 0.74 MG/DL (ref 0.5–1.4)
EOSINOPHIL # BLD AUTO: 0.14 K/UL (ref 0–0.51)
EOSINOPHIL NFR BLD: 2.5 % (ref 0–6.9)
EPI CELLS #/AREA URNS HPF: NEGATIVE /HPF
ERYTHROCYTE [DISTWIDTH] IN BLOOD BY AUTOMATED COUNT: 48.7 FL (ref 35.9–50)
GLOBULIN SER CALC-MCNC: 2.5 G/DL (ref 1.9–3.5)
GLUCOSE SERPL-MCNC: 87 MG/DL (ref 65–99)
GLUCOSE UR STRIP.AUTO-MCNC: NEGATIVE MG/DL
HCT VFR BLD AUTO: 36.8 % (ref 37–47)
HGB BLD-MCNC: 11.3 G/DL (ref 12–16)
HYALINE CASTS #/AREA URNS LPF: NORMAL /LPF
IMM GRANULOCYTES # BLD AUTO: 0.03 K/UL (ref 0–0.11)
IMM GRANULOCYTES NFR BLD AUTO: 0.5 % (ref 0–0.9)
KETONES UR STRIP.AUTO-MCNC: NEGATIVE MG/DL
LEUKOCYTE ESTERASE UR QL STRIP.AUTO: ABNORMAL
LYMPHOCYTES # BLD AUTO: 1.52 K/UL (ref 1–4.8)
LYMPHOCYTES NFR BLD: 27.2 % (ref 22–41)
MCH RBC QN AUTO: 29.9 PG (ref 27–33)
MCHC RBC AUTO-ENTMCNC: 30.7 G/DL (ref 33.6–35)
MCV RBC AUTO: 97.4 FL (ref 81.4–97.8)
MICRO URNS: ABNORMAL
MONOCYTES # BLD AUTO: 0.59 K/UL (ref 0–0.85)
MONOCYTES NFR BLD AUTO: 10.6 % (ref 0–13.4)
NEUTROPHILS # BLD AUTO: 3.28 K/UL (ref 2–7.15)
NEUTROPHILS NFR BLD: 58.7 % (ref 44–72)
NITRITE UR QL STRIP.AUTO: NEGATIVE
NRBC # BLD AUTO: 0 K/UL
NRBC BLD-RTO: 0 /100 WBC
PH UR STRIP.AUTO: 5.5 [PH]
PLATELET # BLD AUTO: 153 K/UL (ref 164–446)
PMV BLD AUTO: 9.8 FL (ref 9–12.9)
POTASSIUM SERPL-SCNC: 4.2 MMOL/L (ref 3.6–5.5)
PROT SERPL-MCNC: 6.5 G/DL (ref 6–8.2)
PROT UR QL STRIP: NEGATIVE MG/DL
RBC # BLD AUTO: 3.78 M/UL (ref 4.2–5.4)
RBC # URNS HPF: NORMAL /HPF
RBC UR QL AUTO: NEGATIVE
SODIUM SERPL-SCNC: 137 MMOL/L (ref 135–145)
SP GR UR STRIP.AUTO: 1.01
TSH SERPL DL<=0.005 MIU/L-ACNC: 2.33 UIU/ML (ref 0.38–5.33)
UROBILINOGEN UR STRIP.AUTO-MCNC: 0.2 MG/DL
WBC # BLD AUTO: 5.6 K/UL (ref 4.8–10.8)
WBC #/AREA URNS HPF: NORMAL /HPF

## 2018-09-19 PROCEDURE — 85025 COMPLETE CBC W/AUTO DIFF WBC: CPT

## 2018-09-19 PROCEDURE — 80053 COMPREHEN METABOLIC PANEL: CPT

## 2018-09-19 PROCEDURE — 81001 URINALYSIS AUTO W/SCOPE: CPT

## 2018-09-19 PROCEDURE — 74019 RADEX ABDOMEN 2 VIEWS: CPT

## 2018-09-19 PROCEDURE — 83880 ASSAY OF NATRIURETIC PEPTIDE: CPT | Mod: GA

## 2018-09-19 PROCEDURE — 71046 X-RAY EXAM CHEST 2 VIEWS: CPT

## 2018-09-19 PROCEDURE — 36415 COLL VENOUS BLD VENIPUNCTURE: CPT

## 2018-09-19 PROCEDURE — 84443 ASSAY THYROID STIM HORMONE: CPT

## 2018-09-19 RX ORDER — RANITIDINE 150 MG/1
TABLET ORAL
Qty: 90 TAB | Refills: 1 | Status: SHIPPED | OUTPATIENT
Start: 2018-09-19 | End: 2019-03-14 | Stop reason: SDUPTHER

## 2018-09-20 NOTE — TELEPHONE ENCOUNTER
Last seen: 09/18/18 by Dr. Iqbal  Next appt: 10/02/18 with Dr. Iqbal    Was the patient seen in the last year in this department? Yes   Does patient have an active prescription for medications requested? No   Received Request Via: Pharmacy

## 2018-09-21 DIAGNOSIS — M79.89 LEG SWELLING: ICD-10-CM

## 2018-09-21 RX ORDER — FUROSEMIDE 20 MG/1
10 TABLET ORAL DAILY
Qty: 15 TAB | Refills: 2 | Status: SHIPPED | OUTPATIENT
Start: 2018-09-21 | End: 2018-10-02

## 2018-09-21 RX ORDER — PANTOPRAZOLE SODIUM 20 MG/1
20 TABLET, DELAYED RELEASE ORAL 2 TIMES DAILY
Qty: 180 TAB | Refills: 0 | Status: SHIPPED | OUTPATIENT
Start: 2018-09-21 | End: 2018-12-13 | Stop reason: SDUPTHER

## 2018-09-26 ENCOUNTER — HOSPITAL ENCOUNTER (OUTPATIENT)
Dept: CARDIOLOGY | Facility: MEDICAL CENTER | Age: 83
End: 2018-09-26
Attending: INTERNAL MEDICINE
Payer: MEDICARE

## 2018-09-26 DIAGNOSIS — M79.89 LEG SWELLING: ICD-10-CM

## 2018-09-26 LAB
LV EJECT FRACT  99904: 55
LV EJECT FRACT MOD 2C 99903: 64.67
LV EJECT FRACT MOD 4C 99902: 65.2
LV EJECT FRACT MOD BP 99901: 65.47

## 2018-09-26 PROCEDURE — 93306 TTE W/DOPPLER COMPLETE: CPT | Mod: 26 | Performed by: INTERNAL MEDICINE

## 2018-09-26 PROCEDURE — 93306 TTE W/DOPPLER COMPLETE: CPT

## 2018-09-28 ENCOUNTER — HOSPITAL ENCOUNTER (OUTPATIENT)
Dept: LAB | Facility: MEDICAL CENTER | Age: 83
End: 2018-09-28
Attending: INTERNAL MEDICINE
Payer: MEDICARE

## 2018-09-28 DIAGNOSIS — M79.89 LEG SWELLING: ICD-10-CM

## 2018-09-28 LAB
ANION GAP SERPL CALC-SCNC: 10 MMOL/L (ref 0–11.9)
BUN SERPL-MCNC: 21 MG/DL (ref 8–22)
CALCIUM SERPL-MCNC: 9.5 MG/DL (ref 8.5–10.5)
CHLORIDE SERPL-SCNC: 104 MMOL/L (ref 96–112)
CO2 SERPL-SCNC: 23 MMOL/L (ref 20–33)
CREAT SERPL-MCNC: 0.77 MG/DL (ref 0.5–1.4)
GLUCOSE SERPL-MCNC: 83 MG/DL (ref 65–99)
POTASSIUM SERPL-SCNC: 4 MMOL/L (ref 3.6–5.5)
SODIUM SERPL-SCNC: 137 MMOL/L (ref 135–145)

## 2018-09-28 PROCEDURE — 80048 BASIC METABOLIC PNL TOTAL CA: CPT

## 2018-09-28 PROCEDURE — 36415 COLL VENOUS BLD VENIPUNCTURE: CPT

## 2018-10-02 ENCOUNTER — OFFICE VISIT (OUTPATIENT)
Dept: INTERNAL MEDICINE | Facility: MEDICAL CENTER | Age: 83
End: 2018-10-02
Payer: MEDICARE

## 2018-10-02 VITALS
TEMPERATURE: 98.3 F | WEIGHT: 118 LBS | HEART RATE: 100 BPM | HEIGHT: 58 IN | BODY MASS INDEX: 24.77 KG/M2 | DIASTOLIC BLOOD PRESSURE: 100 MMHG | OXYGEN SATURATION: 95 % | SYSTOLIC BLOOD PRESSURE: 130 MMHG

## 2018-10-02 DIAGNOSIS — M79.89 LEG SWELLING: ICD-10-CM

## 2018-10-02 PROCEDURE — 99214 OFFICE O/P EST MOD 30 MIN: CPT | Performed by: INTERNAL MEDICINE

## 2018-10-02 NOTE — PROGRESS NOTES
When I did somewhat    Previous serious never mind I didn't yet reviewed. She is well And currently okay; Lanoxin mom is was yet get it for you continue to the possible side effects E7 and B12 you the results Established Patient    Ms. Lund a 88 y.o. female who presents today with:  CC: Follow-Up (labs and imaging)        Assessment and Plan    1. Leg swelling  Suspected bilateral leg swelling secondary to venous insufficiency more than cardiomyopathy. Her TTE revealed a ejection fraction of 55% with a BNP of 154. Mild to moderate pulmonary hypertension. I prescribed 10 mg furosemide which may or dizzy upon standing. She has no symptoms orthopnea, shortness of breath hypoxia cough or decreased exercise tolerance. Suggest that she discontinue Lasix. Also recommended thigh-high compression hoses mild compression.      Current Outpatient Prescriptions   Medication Sig Dispense Refill   • pantoprazole (PROTONIX) 20 MG tablet Take 1 Tab by mouth 2 Times a Day. 180 Tab 0   • furosemide (LASIX) 20 MG Tab Take 0.5 Tabs by mouth every day. 15 Tab 2   • raNITidine (ZANTAC) 150 MG Tab TAKE ONE TABLET BY MOUTH ONCE DAILY 90 Tab 1   • Polyethylene Glycol 3350 (MIRALAX PO) Take  by mouth.     • acetaminophen (TYLENOL) 325 MG Tab Take 650 mg by mouth every four hours as needed.     • docusate sodium (COLACE) 100 MG Cap Take 100 mg by mouth 2 times a day.     • simethicone (GAS-X) 80 MG Chew Tab Take 80 mg by mouth every 6 hours as needed for Flatulence.     • valsartan (DIOVAN) 320 MG tablet Take 320 mg by mouth every day.     • doxazosin (CARDURA) 2 MG Tab Take 4 mg by mouth every day.     • vitamin D (CHOLECALCIFEROL) 1000 UNIT Tab Take 1,000 Units by mouth every day.     • Multiple Vitamins-Minerals (PRESERVISION AREDS 2) Cap Take 1 Tab by mouth 2 Times a Day.     • aspirin (ASA) 81 MG CHEW chewable tablet Take 81 mg by mouth every day.       No current facility-administered medications for this visit.          followup No  Follow-up on file.    This note was created using voice recognition software (Dragon). The accuracy of the dictation is limited by the abilities of the software. I have reviewed the note prior to signing, however some errors in grammar and context are still possible. If you have any questions related to this note please do not hesitate to contact our office.   _______________________________________________________    HPI:   1. Leg swelling  At the last visit she had leg swelling that extended above the knees.  The swelling is intermittent. Right > Left. Slightly uncomfortable. At home she tries to elevate her legs. No SOBE. No orthopnea. Elevation helps the swelling. She is wearing her compression hoses- knee high. . Previously prescribed sprinolactone but discontinued due to lightheadedness. BP at home are in the 160-180s with diastolic in the 90-100s. Not much effect with lasix on the leg swelling.   I prescribed 10mg of furosemide due to elevated BNP. Echo with 55% EF. Feeling slightly lightheaded upon standing briefly. TTE with mild to moderate pulmonary hypertension.  No SOB, hypoxia or cough. with No restless sleep. No daytime sleepiness or headaches.   I review her most recent lab results and diagnostic studies with her today.       has a past medical history of Chronic venous insufficiency (5/26/2016); Hypertension (5/26/2016); LLQ abdominal mass (5/26/2016); Palpitations (5/26/2016); and WILLIAN (renal artery stenosis) (HCC) (5/26/2016).     reports that she has never smoked. She has never used smokeless tobacco. She reports that she does not drink alcohol or use drugs.      ROS: Pertinent positives as stated in HPI, all others reviewed as negative:  Gastrointestinal ROS: No abdominal pain, No change in bowel habits, No significant change in appetite, No nausea, vomiting, diarrhea, or constipation, No regurgitation, heartburn, foul taste in mouth, or frequent belching. Currently no constipation. takin colace  "daily and not miralax      Physical Exam  /100 (BP Location: Left arm, Patient Position: Sitting, BP Cuff Size: Adult)   Pulse 100   Temp 36.8 °C (98.3 °F) (Temporal)   Ht 1.473 m (4' 10\")   Wt 53.5 kg (118 lb)   SpO2 95%   BMI 24.66 kg/m²   Constitutional:  oriented to person, place, and time. No distress.   Eyes: Pupils are equal, round, and reactive to light. No scleral icterus.   Neck: Neck supple. No thyromegaly present.   Cardiovascular: Normal rate, regular rhythm and normal heart sounds.  Exam reveals no gallop and no friction rub.    No murmur heard.  Pulmonary/Chest: Breath sounds normal. Chest wall is not dull to percussion.   Musculoskeletal:   Bilateral leg edema, non pitting. Varicose veins bilateral  Lymphadenopathy: no cervical adenopathy  Neurological: alert and oriented to person, place, and time.   Skin: No cyanosis. Nails show no clubbing.          Current Outpatient Prescriptions on File Prior to Visit   Medication Sig Dispense Refill   • pantoprazole (PROTONIX) 20 MG tablet Take 1 Tab by mouth 2 Times a Day. 180 Tab 0   • furosemide (LASIX) 20 MG Tab Take 0.5 Tabs by mouth every day. 15 Tab 2   • raNITidine (ZANTAC) 150 MG Tab TAKE ONE TABLET BY MOUTH ONCE DAILY 90 Tab 1   • Polyethylene Glycol 3350 (MIRALAX PO) Take  by mouth.     • acetaminophen (TYLENOL) 325 MG Tab Take 650 mg by mouth every four hours as needed.     • docusate sodium (COLACE) 100 MG Cap Take 100 mg by mouth 2 times a day.     • simethicone (GAS-X) 80 MG Chew Tab Take 80 mg by mouth every 6 hours as needed for Flatulence.     • valsartan (DIOVAN) 320 MG tablet Take 320 mg by mouth every day.     • doxazosin (CARDURA) 2 MG Tab Take 4 mg by mouth every day.     • vitamin D (CHOLECALCIFEROL) 1000 UNIT Tab Take 1,000 Units by mouth every day.     • Multiple Vitamins-Minerals (PRESERVISION AREDS 2) Cap Take 1 Tab by mouth 2 Times a Day.     • aspirin (ASA) 81 MG CHEW chewable tablet Take 81 mg by mouth every day.   "     No current facility-administered medications on file prior to visit.            Signed by: Jaswinder Iqbal M.D.

## 2018-10-29 ENCOUNTER — HOSPITAL ENCOUNTER (OUTPATIENT)
Dept: RADIOLOGY | Facility: MEDICAL CENTER | Age: 83
End: 2018-10-29
Attending: INTERNAL MEDICINE
Payer: MEDICARE

## 2018-10-29 DIAGNOSIS — Z12.31 VISIT FOR SCREENING MAMMOGRAM: ICD-10-CM

## 2018-10-29 PROCEDURE — 77067 SCR MAMMO BI INCL CAD: CPT

## 2018-12-14 RX ORDER — PANTOPRAZOLE SODIUM 20 MG/1
20 TABLET, DELAYED RELEASE ORAL 2 TIMES DAILY
Qty: 180 TAB | Refills: 0 | Status: SHIPPED | OUTPATIENT
Start: 2018-12-14 | End: 2019-05-14 | Stop reason: SDUPTHER

## 2018-12-14 NOTE — TELEPHONE ENCOUNTER
PT SEEN: 10/2/18 WITH Dr. MUNOZ NEXT APPT 1/3/19 WITH Dr. MUNOZ  Was the patient seen in the last year in this department? Yes     Does patient have an active prescription for medications requested? No     Received Request Via: Pharmacy

## 2019-01-03 ENCOUNTER — OFFICE VISIT (OUTPATIENT)
Dept: INTERNAL MEDICINE | Facility: MEDICAL CENTER | Age: 84
End: 2019-01-03
Payer: MEDICARE

## 2019-01-03 VITALS
OXYGEN SATURATION: 97 % | HEIGHT: 58 IN | SYSTOLIC BLOOD PRESSURE: 140 MMHG | WEIGHT: 119.2 LBS | HEART RATE: 72 BPM | DIASTOLIC BLOOD PRESSURE: 90 MMHG | TEMPERATURE: 97.5 F | BODY MASS INDEX: 25.02 KG/M2

## 2019-01-03 DIAGNOSIS — R15.1 FECAL SMEARING: ICD-10-CM

## 2019-01-03 PROCEDURE — 99213 OFFICE O/P EST LOW 20 MIN: CPT | Performed by: INTERNAL MEDICINE

## 2019-01-03 ASSESSMENT — PATIENT HEALTH QUESTIONNAIRE - PHQ9: CLINICAL INTERPRETATION OF PHQ2 SCORE: 0

## 2019-01-03 NOTE — PROGRESS NOTES
Established Patient    Ms. Lund a 88 y.o. female who presents today with:  CC: Follow-Up (leg swelling)        Assessment and Plan    1.  Fecal smearing-fecal incontinence.  She has a sensation of a mass when she defecates and on exam she has external and internal hemorrhoids but there is stool in the vault.  She has been having fecal incontinence increase in frequency recently.  Suspect that she will need to have anoscopy and thus I will refer her to gastroenterology.    Current Outpatient Prescriptions   Medication Sig Dispense Refill   • pantoprazole (PROTONIX) 20 MG tablet Take 1 Tab by mouth 2 Times a Day. 180 Tab 0   • raNITidine (ZANTAC) 150 MG Tab TAKE ONE TABLET BY MOUTH ONCE DAILY 90 Tab 1   • Polyethylene Glycol 3350 (MIRALAX PO) Take  by mouth.     • acetaminophen (TYLENOL) 325 MG Tab Take 650 mg by mouth every four hours as needed.     • docusate sodium (COLACE) 100 MG Cap Take 100 mg by mouth 2 times a day.     • simethicone (GAS-X) 80 MG Chew Tab Take 80 mg by mouth every 6 hours as needed for Flatulence.     • valsartan (DIOVAN) 320 MG tablet Take 320 mg by mouth every day.     • doxazosin (CARDURA) 2 MG Tab Take 4 mg by mouth every day.     • vitamin D (CHOLECALCIFEROL) 1000 UNIT Tab Take 1,000 Units by mouth every day.     • Multiple Vitamins-Minerals (PRESERVISION AREDS 2) Cap Take 1 Tab by mouth 2 Times a Day.     • aspirin (ASA) 81 MG CHEW chewable tablet Take 81 mg by mouth every day.       No current facility-administered medications for this visit.          followup No Follow-up on file.    This note was created using voice recognition software (Dragon). The accuracy of the dictation is limited by the abilities of the software. I have reviewed the note prior to signing, however some errors in grammar and context are still possible. If you have any questions related to this note please do not hesitate to contact our office.   _______________________________________________________    HPI:  "  88 year old woman with a history of intermittent abdominal pain, hypertension, gastroesophageal reflux disease, inguinal hernia, leg cramps and osteoarthritis of the neck and shoulder with a complaint of fecal incontinence   In October, while defecating she had the sensation that her 'insides' turned inside out. She still has that sensation. When she defecates, she does not have to strain.  She occasionally has constipation but not recently.. Today when she rebel from the chair to come the appointment she has some fecal incontinence.  Since October, when she has gas and pressure she can have some fecal incontinence. No pain in the rectum. She has a history hemorrhoids. Cannot feel a mass but feels like something is hanging there. Occasionally she has 'extra' tissue from the vagina when she has a bowel movement.  This extra tissue receives very rapidly.. No problem with initiating urination.  No hematochezia or melena no increased abdominal pain.  Occasionally uses fiber products.     has a past medical history of Chronic venous insufficiency (5/26/2016); Hypertension (5/26/2016); LLQ abdominal mass (5/26/2016); Palpitations (5/26/2016); and WILLIAN (renal artery stenosis) (HCC) (5/26/2016).     reports that she has never smoked. She has never used smokeless tobacco. She reports that she does not drink alcohol or use drugs.      ROS: Pertinent positives as stated in HPI, all others reviewed as negative:        Physical Exam  /90 (BP Location: Left arm, Patient Position: Sitting, BP Cuff Size: Adult)   Pulse 72   Temp 36.4 °C (97.5 °F) (Temporal)   Ht 1.473 m (4' 10\")   Wt 54.1 kg (119 lb 3.2 oz)   SpO2 97%   BMI 24.91 kg/m²   Constitutional:  oriented to person, place, and time. No distress.     Other: Abdomen: Soft nontender positive bowel sounds in all 4 no masses palpated  Rectal exam: mulitple external hemorrhoids and skin tags. Internal hemorrhoids notice on exam.  Stool on examiner's " finger      Current Outpatient Prescriptions on File Prior to Visit   Medication Sig Dispense Refill   • pantoprazole (PROTONIX) 20 MG tablet Take 1 Tab by mouth 2 Times a Day. 180 Tab 0   • raNITidine (ZANTAC) 150 MG Tab TAKE ONE TABLET BY MOUTH ONCE DAILY 90 Tab 1   • Polyethylene Glycol 3350 (MIRALAX PO) Take  by mouth.     • acetaminophen (TYLENOL) 325 MG Tab Take 650 mg by mouth every four hours as needed.     • docusate sodium (COLACE) 100 MG Cap Take 100 mg by mouth 2 times a day.     • simethicone (GAS-X) 80 MG Chew Tab Take 80 mg by mouth every 6 hours as needed for Flatulence.     • valsartan (DIOVAN) 320 MG tablet Take 320 mg by mouth every day.     • doxazosin (CARDURA) 2 MG Tab Take 4 mg by mouth every day.     • vitamin D (CHOLECALCIFEROL) 1000 UNIT Tab Take 1,000 Units by mouth every day.     • Multiple Vitamins-Minerals (PRESERVISION AREDS 2) Cap Take 1 Tab by mouth 2 Times a Day.     • aspirin (ASA) 81 MG CHEW chewable tablet Take 81 mg by mouth every day.       No current facility-administered medications on file prior to visit.            Signed by: Jaswinder Iqbal M.D.

## 2019-01-10 ENCOUNTER — HOSPITAL ENCOUNTER (OUTPATIENT)
Dept: LAB | Facility: MEDICAL CENTER | Age: 84
End: 2019-01-10
Attending: INTERNAL MEDICINE
Payer: MEDICARE

## 2019-01-10 LAB
ALBUMIN SERPL BCP-MCNC: 3.9 G/DL (ref 3.2–4.9)
ALBUMIN/GLOB SERPL: 1.3 G/DL
ALP SERPL-CCNC: 96 U/L (ref 30–99)
ALT SERPL-CCNC: 13 U/L (ref 2–50)
ANION GAP SERPL CALC-SCNC: 4 MMOL/L (ref 0–11.9)
AST SERPL-CCNC: 21 U/L (ref 12–45)
BASOPHILS # BLD AUTO: 0.6 % (ref 0–1.8)
BASOPHILS # BLD: 0.04 K/UL (ref 0–0.12)
BILIRUB SERPL-MCNC: 0.4 MG/DL (ref 0.1–1.5)
BUN SERPL-MCNC: 22 MG/DL (ref 8–22)
CALCIUM SERPL-MCNC: 9.6 MG/DL (ref 8.5–10.5)
CHLORIDE SERPL-SCNC: 108 MMOL/L (ref 96–112)
CHOLEST SERPL-MCNC: 188 MG/DL (ref 100–199)
CO2 SERPL-SCNC: 27 MMOL/L (ref 20–33)
CREAT SERPL-MCNC: 0.89 MG/DL (ref 0.5–1.4)
CREAT UR-MCNC: 42.3 MG/DL
EOSINOPHIL # BLD AUTO: 0.2 K/UL (ref 0–0.51)
EOSINOPHIL NFR BLD: 3 % (ref 0–6.9)
ERYTHROCYTE [DISTWIDTH] IN BLOOD BY AUTOMATED COUNT: 45.7 FL (ref 35.9–50)
FASTING STATUS PATIENT QL REPORTED: NORMAL
GLOBULIN SER CALC-MCNC: 2.9 G/DL (ref 1.9–3.5)
GLUCOSE SERPL-MCNC: 90 MG/DL (ref 65–99)
HCT VFR BLD AUTO: 41.9 % (ref 37–47)
HDLC SERPL-MCNC: 65 MG/DL
HGB BLD-MCNC: 13.1 G/DL (ref 12–16)
IMM GRANULOCYTES # BLD AUTO: 0.04 K/UL (ref 0–0.11)
IMM GRANULOCYTES NFR BLD AUTO: 0.6 % (ref 0–0.9)
LDLC SERPL CALC-MCNC: 108 MG/DL
LYMPHOCYTES # BLD AUTO: 1.5 K/UL (ref 1–4.8)
LYMPHOCYTES NFR BLD: 22.6 % (ref 22–41)
MCH RBC QN AUTO: 29.4 PG (ref 27–33)
MCHC RBC AUTO-ENTMCNC: 31.3 G/DL (ref 33.6–35)
MCV RBC AUTO: 93.9 FL (ref 81.4–97.8)
MICROALBUMIN UR-MCNC: 2.5 MG/DL
MICROALBUMIN/CREAT UR: 59 MG/G (ref 0–30)
MONOCYTES # BLD AUTO: 0.67 K/UL (ref 0–0.85)
MONOCYTES NFR BLD AUTO: 10.1 % (ref 0–13.4)
NEUTROPHILS # BLD AUTO: 4.18 K/UL (ref 2–7.15)
NEUTROPHILS NFR BLD: 63.1 % (ref 44–72)
NRBC # BLD AUTO: 0 K/UL
NRBC BLD-RTO: 0 /100 WBC
PLATELET # BLD AUTO: 153 K/UL (ref 164–446)
PMV BLD AUTO: 10.6 FL (ref 9–12.9)
POTASSIUM SERPL-SCNC: 3.9 MMOL/L (ref 3.6–5.5)
PROT SERPL-MCNC: 6.8 G/DL (ref 6–8.2)
RBC # BLD AUTO: 4.46 M/UL (ref 4.2–5.4)
SODIUM SERPL-SCNC: 139 MMOL/L (ref 135–145)
TRIGL SERPL-MCNC: 77 MG/DL (ref 0–149)
TSH SERPL DL<=0.005 MIU/L-ACNC: 3.02 UIU/ML (ref 0.38–5.33)
WBC # BLD AUTO: 6.6 K/UL (ref 4.8–10.8)

## 2019-01-10 PROCEDURE — 36415 COLL VENOUS BLD VENIPUNCTURE: CPT

## 2019-01-10 PROCEDURE — 84443 ASSAY THYROID STIM HORMONE: CPT

## 2019-01-10 PROCEDURE — 85025 COMPLETE CBC W/AUTO DIFF WBC: CPT

## 2019-01-10 PROCEDURE — 80053 COMPREHEN METABOLIC PANEL: CPT

## 2019-01-10 PROCEDURE — 80061 LIPID PANEL: CPT

## 2019-01-10 PROCEDURE — 82570 ASSAY OF URINE CREATININE: CPT

## 2019-01-10 PROCEDURE — 82043 UR ALBUMIN QUANTITATIVE: CPT

## 2019-02-26 ENCOUNTER — HOSPITAL ENCOUNTER (OUTPATIENT)
Dept: RADIOLOGY | Facility: MEDICAL CENTER | Age: 84
End: 2019-02-26
Attending: INTERNAL MEDICINE
Payer: MEDICARE

## 2019-02-26 DIAGNOSIS — R15.1 FECAL SMEARING: ICD-10-CM

## 2019-02-26 DIAGNOSIS — K59.00 CONSTIPATION, UNSPECIFIED CONSTIPATION TYPE: ICD-10-CM

## 2019-02-26 DIAGNOSIS — R10.84 GENERALIZED ABDOMINAL PAIN: ICD-10-CM

## 2019-02-26 PROCEDURE — 74018 RADEX ABDOMEN 1 VIEW: CPT

## 2019-03-14 ENCOUNTER — HOSPITAL ENCOUNTER (OUTPATIENT)
Dept: LAB | Facility: MEDICAL CENTER | Age: 84
End: 2019-03-14
Attending: INTERNAL MEDICINE
Payer: MEDICARE

## 2019-03-14 LAB
ANION GAP SERPL CALC-SCNC: 7 MMOL/L (ref 0–11.9)
BUN SERPL-MCNC: 25 MG/DL (ref 8–22)
CALCIUM SERPL-MCNC: 9.9 MG/DL (ref 8.5–10.5)
CHLORIDE SERPL-SCNC: 109 MMOL/L (ref 96–112)
CO2 SERPL-SCNC: 26 MMOL/L (ref 20–33)
CREAT SERPL-MCNC: 0.94 MG/DL (ref 0.5–1.4)
GLUCOSE SERPL-MCNC: 105 MG/DL (ref 65–99)
POTASSIUM SERPL-SCNC: 3.7 MMOL/L (ref 3.6–5.5)
SODIUM SERPL-SCNC: 142 MMOL/L (ref 135–145)

## 2019-03-14 PROCEDURE — 36415 COLL VENOUS BLD VENIPUNCTURE: CPT

## 2019-03-14 PROCEDURE — 80048 BASIC METABOLIC PNL TOTAL CA: CPT

## 2019-03-14 RX ORDER — RANITIDINE 150 MG/1
TABLET ORAL
Qty: 90 TAB | Refills: 2 | Status: SHIPPED | OUTPATIENT
Start: 2019-03-14 | End: 2021-07-03

## 2019-03-14 NOTE — TELEPHONE ENCOUNTER
Last seen: 01/03/19 by Dr. Iqbal  Next appt: 04/03/19 with Dr. Iqbal    Was the patient seen in the last year in this department? Yes   Does patient have an active prescription for medications requested? No   Received Request Via: Pharmacy

## 2019-04-03 ENCOUNTER — OFFICE VISIT (OUTPATIENT)
Dept: INTERNAL MEDICINE | Facility: MEDICAL CENTER | Age: 84
End: 2019-04-03
Payer: MEDICARE

## 2019-04-03 VITALS
HEIGHT: 58 IN | SYSTOLIC BLOOD PRESSURE: 140 MMHG | DIASTOLIC BLOOD PRESSURE: 100 MMHG | WEIGHT: 117.8 LBS | TEMPERATURE: 97.9 F | BODY MASS INDEX: 24.73 KG/M2

## 2019-04-03 DIAGNOSIS — K59.09 CHRONIC CONSTIPATION: ICD-10-CM

## 2019-04-03 DIAGNOSIS — I10 ESSENTIAL HYPERTENSION: ICD-10-CM

## 2019-04-03 PROCEDURE — 99214 OFFICE O/P EST MOD 30 MIN: CPT | Performed by: INTERNAL MEDICINE

## 2019-04-03 NOTE — PATIENT INSTRUCTIONS
Take Mirlax daily as directed, increase appropriately  Call [facility] to discuss side effect of spirinolactone  Take blood pressure cuff with you to Dr. Bloch's appointment

## 2019-08-25 ENCOUNTER — APPOINTMENT (OUTPATIENT)
Dept: RADIOLOGY | Facility: MEDICAL CENTER | Age: 84
DRG: 069 | End: 2019-08-25
Attending: EMERGENCY MEDICINE
Payer: COMMERCIAL

## 2019-08-25 ENCOUNTER — HOSPITAL ENCOUNTER (INPATIENT)
Facility: MEDICAL CENTER | Age: 84
LOS: 2 days | DRG: 069 | End: 2019-08-28
Attending: EMERGENCY MEDICINE | Admitting: INTERNAL MEDICINE
Payer: COMMERCIAL

## 2019-08-25 DIAGNOSIS — E86.0 DEHYDRATION: ICD-10-CM

## 2019-08-25 DIAGNOSIS — R79.89 ELEVATED TROPONIN: ICD-10-CM

## 2019-08-25 DIAGNOSIS — E78.5 DYSLIPIDEMIA: ICD-10-CM

## 2019-08-25 DIAGNOSIS — G89.29 CHRONIC ABDOMINAL PAIN: ICD-10-CM

## 2019-08-25 DIAGNOSIS — R01.1 HEART MURMUR, SYSTOLIC: ICD-10-CM

## 2019-08-25 DIAGNOSIS — K21.9 GASTROESOPHAGEAL REFLUX DISEASE WITHOUT ESOPHAGITIS: ICD-10-CM

## 2019-08-25 DIAGNOSIS — I95.1 ORTHOSTATIC HYPOTENSION: ICD-10-CM

## 2019-08-25 DIAGNOSIS — M50.90 CERVICAL DISC DISEASE: ICD-10-CM

## 2019-08-25 DIAGNOSIS — R10.9 CHRONIC ABDOMINAL PAIN: ICD-10-CM

## 2019-08-25 DIAGNOSIS — R42 VERTIGO: ICD-10-CM

## 2019-08-25 DIAGNOSIS — G45.9 TIA (TRANSIENT ISCHEMIC ATTACK): ICD-10-CM

## 2019-08-25 DIAGNOSIS — I70.1 RAS (RENAL ARTERY STENOSIS) (HCC): ICD-10-CM

## 2019-08-25 DIAGNOSIS — N17.9 AKI (ACUTE KIDNEY INJURY) (HCC): ICD-10-CM

## 2019-08-25 DIAGNOSIS — D69.6 THROMBOCYTOPENIA (HCC): ICD-10-CM

## 2019-08-25 DIAGNOSIS — I10 ESSENTIAL HYPERTENSION: ICD-10-CM

## 2019-08-25 LAB
ALBUMIN SERPL BCP-MCNC: 3.9 G/DL (ref 3.2–4.9)
ALBUMIN/GLOB SERPL: 1.6 G/DL
ALP SERPL-CCNC: 71 U/L (ref 30–99)
ALT SERPL-CCNC: 10 U/L (ref 2–50)
ANION GAP SERPL CALC-SCNC: 11 MMOL/L (ref 0–11.9)
AST SERPL-CCNC: 21 U/L (ref 12–45)
BASOPHILS # BLD AUTO: 0.5 % (ref 0–1.8)
BASOPHILS # BLD: 0.04 K/UL (ref 0–0.12)
BILIRUB SERPL-MCNC: 0.4 MG/DL (ref 0.1–1.5)
BUN SERPL-MCNC: 22 MG/DL (ref 8–22)
CALCIUM SERPL-MCNC: 10 MG/DL (ref 8.5–10.5)
CHLORIDE SERPL-SCNC: 111 MMOL/L (ref 96–112)
CO2 SERPL-SCNC: 21 MMOL/L (ref 20–33)
CREAT SERPL-MCNC: 1.58 MG/DL (ref 0.5–1.4)
EKG IMPRESSION: NORMAL
EOSINOPHIL # BLD AUTO: 0.18 K/UL (ref 0–0.51)
EOSINOPHIL NFR BLD: 2.2 % (ref 0–6.9)
ERYTHROCYTE [DISTWIDTH] IN BLOOD BY AUTOMATED COUNT: 49 FL (ref 35.9–50)
GLOBULIN SER CALC-MCNC: 2.4 G/DL (ref 1.9–3.5)
GLUCOSE SERPL-MCNC: 102 MG/DL (ref 65–99)
HCT VFR BLD AUTO: 41.1 % (ref 37–47)
HGB BLD-MCNC: 12.9 G/DL (ref 12–16)
IMM GRANULOCYTES # BLD AUTO: 0.04 K/UL (ref 0–0.11)
IMM GRANULOCYTES NFR BLD AUTO: 0.5 % (ref 0–0.9)
LYMPHOCYTES # BLD AUTO: 1.67 K/UL (ref 1–4.8)
LYMPHOCYTES NFR BLD: 20.6 % (ref 22–41)
MCH RBC QN AUTO: 29.5 PG (ref 27–33)
MCHC RBC AUTO-ENTMCNC: 31.4 G/DL (ref 33.6–35)
MCV RBC AUTO: 93.8 FL (ref 81.4–97.8)
MONOCYTES # BLD AUTO: 0.77 K/UL (ref 0–0.85)
MONOCYTES NFR BLD AUTO: 9.5 % (ref 0–13.4)
NEUTROPHILS # BLD AUTO: 5.41 K/UL (ref 2–7.15)
NEUTROPHILS NFR BLD: 66.7 % (ref 44–72)
NRBC # BLD AUTO: 0 K/UL
NRBC BLD-RTO: 0 /100 WBC
PLATELET # BLD AUTO: 143 K/UL (ref 164–446)
PMV BLD AUTO: 9.9 FL (ref 9–12.9)
POTASSIUM SERPL-SCNC: 3.9 MMOL/L (ref 3.6–5.5)
PROT SERPL-MCNC: 6.3 G/DL (ref 6–8.2)
RBC # BLD AUTO: 4.38 M/UL (ref 4.2–5.4)
SODIUM SERPL-SCNC: 143 MMOL/L (ref 135–145)
TROPONIN T SERPL-MCNC: 41 NG/L (ref 6–19)
WBC # BLD AUTO: 8.1 K/UL (ref 4.8–10.8)

## 2019-08-25 PROCEDURE — 80053 COMPREHEN METABOLIC PANEL: CPT

## 2019-08-25 PROCEDURE — 700105 HCHG RX REV CODE 258: Performed by: FAMILY MEDICINE

## 2019-08-25 PROCEDURE — 700102 HCHG RX REV CODE 250 W/ 637 OVERRIDE(OP): Performed by: FAMILY MEDICINE

## 2019-08-25 PROCEDURE — G0378 HOSPITAL OBSERVATION PER HR: HCPCS

## 2019-08-25 PROCEDURE — 99285 EMERGENCY DEPT VISIT HI MDM: CPT

## 2019-08-25 PROCEDURE — 700105 HCHG RX REV CODE 258: Performed by: EMERGENCY MEDICINE

## 2019-08-25 PROCEDURE — 85025 COMPLETE CBC W/AUTO DIFF WBC: CPT

## 2019-08-25 PROCEDURE — 99220 PR INITIAL OBSERVATION CARE,LEVL III: CPT | Performed by: FAMILY MEDICINE

## 2019-08-25 PROCEDURE — 93005 ELECTROCARDIOGRAM TRACING: CPT | Performed by: EMERGENCY MEDICINE

## 2019-08-25 PROCEDURE — A9270 NON-COVERED ITEM OR SERVICE: HCPCS | Performed by: FAMILY MEDICINE

## 2019-08-25 PROCEDURE — 70450 CT HEAD/BRAIN W/O DYE: CPT

## 2019-08-25 PROCEDURE — 83036 HEMOGLOBIN GLYCOSYLATED A1C: CPT

## 2019-08-25 PROCEDURE — 84484 ASSAY OF TROPONIN QUANT: CPT

## 2019-08-25 RX ORDER — ASPIRIN 81 MG/1
324 TABLET, CHEWABLE ORAL DAILY
Status: DISCONTINUED | OUTPATIENT
Start: 2019-08-25 | End: 2019-08-26 | Stop reason: ALTCHOICE

## 2019-08-25 RX ORDER — LABETALOL HYDROCHLORIDE 5 MG/ML
10 INJECTION, SOLUTION INTRAVENOUS EVERY 4 HOURS PRN
Status: DISCONTINUED | OUTPATIENT
Start: 2019-08-25 | End: 2019-08-26

## 2019-08-25 RX ORDER — EPLERENONE 50 MG/1
50 TABLET, FILM COATED ORAL DAILY
COMMUNITY
End: 2021-07-03

## 2019-08-25 RX ORDER — ONDANSETRON 2 MG/ML
4 INJECTION INTRAMUSCULAR; INTRAVENOUS EVERY 4 HOURS PRN
Status: DISCONTINUED | OUTPATIENT
Start: 2019-08-25 | End: 2019-08-28 | Stop reason: HOSPADM

## 2019-08-25 RX ORDER — ONDANSETRON 4 MG/1
4 TABLET, ORALLY DISINTEGRATING ORAL EVERY 4 HOURS PRN
Status: DISCONTINUED | OUTPATIENT
Start: 2019-08-25 | End: 2019-08-28 | Stop reason: HOSPADM

## 2019-08-25 RX ORDER — BISACODYL 10 MG
10 SUPPOSITORY, RECTAL RECTAL
Status: DISCONTINUED | OUTPATIENT
Start: 2019-08-25 | End: 2019-08-28 | Stop reason: HOSPADM

## 2019-08-25 RX ORDER — ATORVASTATIN CALCIUM 40 MG/1
40 TABLET, FILM COATED ORAL EVERY EVENING
Status: DISCONTINUED | OUTPATIENT
Start: 2019-08-25 | End: 2019-08-28 | Stop reason: HOSPADM

## 2019-08-25 RX ORDER — AMOXICILLIN 250 MG
2 CAPSULE ORAL 2 TIMES DAILY
Status: DISCONTINUED | OUTPATIENT
Start: 2019-08-26 | End: 2019-08-28 | Stop reason: HOSPADM

## 2019-08-25 RX ORDER — ASPIRIN 325 MG
325 TABLET ORAL DAILY
Status: DISCONTINUED | OUTPATIENT
Start: 2019-08-25 | End: 2019-08-26 | Stop reason: ALTCHOICE

## 2019-08-25 RX ORDER — SODIUM CHLORIDE 9 MG/ML
INJECTION, SOLUTION INTRAVENOUS CONTINUOUS
Status: DISCONTINUED | OUTPATIENT
Start: 2019-08-25 | End: 2019-08-26 | Stop reason: ALTCHOICE

## 2019-08-25 RX ORDER — HYDRALAZINE HYDROCHLORIDE 20 MG/ML
10 INJECTION INTRAMUSCULAR; INTRAVENOUS
Status: DISCONTINUED | OUTPATIENT
Start: 2019-08-25 | End: 2019-08-26

## 2019-08-25 RX ORDER — SODIUM CHLORIDE 9 MG/ML
500 INJECTION, SOLUTION INTRAVENOUS ONCE
Status: COMPLETED | OUTPATIENT
Start: 2019-08-25 | End: 2019-08-25

## 2019-08-25 RX ORDER — ASPIRIN 300 MG/1
300 SUPPOSITORY RECTAL DAILY
Status: DISCONTINUED | OUTPATIENT
Start: 2019-08-25 | End: 2019-08-26 | Stop reason: ALTCHOICE

## 2019-08-25 RX ORDER — POLYETHYLENE GLYCOL 3350 17 G/17G
1 POWDER, FOR SOLUTION ORAL
Status: DISCONTINUED | OUTPATIENT
Start: 2019-08-25 | End: 2019-08-28 | Stop reason: HOSPADM

## 2019-08-25 RX ADMIN — SODIUM CHLORIDE 500 ML: 9 INJECTION, SOLUTION INTRAVENOUS at 17:01

## 2019-08-25 RX ADMIN — ATORVASTATIN CALCIUM 40 MG: 40 TABLET, FILM COATED ORAL at 22:31

## 2019-08-25 RX ADMIN — SODIUM CHLORIDE: 9 INJECTION, SOLUTION INTRAVENOUS at 22:38

## 2019-08-25 RX ADMIN — ASPIRIN 325 MG: 325 TABLET, FILM COATED ORAL at 22:31

## 2019-08-25 ASSESSMENT — LIFESTYLE VARIABLES
AVERAGE NUMBER OF DAYS PER WEEK YOU HAVE A DRINK CONTAINING ALCOHOL: 0
HAVE PEOPLE ANNOYED YOU BY CRITICIZING YOUR DRINKING: NO
HOW MANY TIMES IN THE PAST YEAR HAVE YOU HAD 5 OR MORE DRINKS IN A DAY: 0
TOTAL SCORE: 0
DO YOU DRINK ALCOHOL: NO
ON A TYPICAL DAY WHEN YOU DRINK ALCOHOL HOW MANY DRINKS DO YOU HAVE: 0
TOTAL SCORE: 0
EVER_SMOKED: NEVER
CONSUMPTION TOTAL: NEGATIVE
EVER HAD A DRINK FIRST THING IN THE MORNING TO STEADY YOUR NERVES TO GET RID OF A HANGOVER: NO
HAVE YOU EVER FELT YOU SHOULD CUT DOWN ON YOUR DRINKING: NO
EVER FELT BAD OR GUILTY ABOUT YOUR DRINKING: NO
TOTAL SCORE: 0
ALCOHOL_USE: NO
DOES PATIENT WANT TO STOP DRINKING: NO

## 2019-08-25 ASSESSMENT — COGNITIVE AND FUNCTIONAL STATUS - GENERAL
MOVING FROM LYING ON BACK TO SITTING ON SIDE OF FLAT BED: A LITTLE
HELP NEEDED FOR BATHING: A LITTLE
STANDING UP FROM CHAIR USING ARMS: A LITTLE
WALKING IN HOSPITAL ROOM: A LITTLE
DAILY ACTIVITIY SCORE: 19
DRESSING REGULAR UPPER BODY CLOTHING: A LITTLE
SUGGESTED CMS G CODE MODIFIER MOBILITY: CJ
CLIMB 3 TO 5 STEPS WITH RAILING: A LITTLE
DRESSING REGULAR LOWER BODY CLOTHING: A LITTLE
MOBILITY SCORE: 20
TOILETING: A LITTLE
SUGGESTED CMS G CODE MODIFIER DAILY ACTIVITY: CK
PERSONAL GROOMING: A LITTLE

## 2019-08-25 ASSESSMENT — PATIENT HEALTH QUESTIONNAIRE - PHQ9
2. FEELING DOWN, DEPRESSED, IRRITABLE, OR HOPELESS: NOT AT ALL
SUM OF ALL RESPONSES TO PHQ9 QUESTIONS 1 AND 2: 0
1. LITTLE INTEREST OR PLEASURE IN DOING THINGS: NOT AT ALL

## 2019-08-25 NOTE — ED PROVIDER NOTES
ED Provider Note    HPI: Patient is an 88-year-old female who presented to the emergency department August 25, 2019 at 3:53 PM with a chief complaint of altered level of consciousness and dizziness.    The patient's sister reports that she lot saw the patient normal at about 9:30 AM when she dropped her sister off after [employer].  When she saw that the patient again at about 3 PM she was having apparent confusion and speaking Hungarian which is not normal for her as she usually speaks English.  Her mental status is now improved and she is now speaking English again.  The duration of this abnormal mental status is indeterminate.  The patient reports chronic abdominal pain that is unchanged from previous.  No recent head trauma no fever no chills no cough no chronic anticoagulation.  No nausea or vomiting.  No other somatic complaint    Review of Systems: Positive for confusion and change in speech which is now resolved chronic abdominal pain negative recent head trauma fever chills cough nausea or vomiting.  Review of systems reviewed with patient, all other systems negative    Past medical/surgical history: Chronic abdominal pain renal artery stenosis left lower quadrant abdominal mass hypertension    Medications: Protonix Zantac MiraLAX Tylenol Colace Diovan Cardura aspirin    Allergies: Acyclovir ciprofloxacin citalopram hydralazine Lyrica Neurontin Norvasc morphine tramadol    Social History: No history of smoking no alcohol use      Physical exam: Constitutional: Elderly female awake alert  Vital signs: Temperature 97.5 pulse 125 respirations 16 blood pressure 99/67 pulse oximetry 94%  EYES: PERRL, EOMI, Conjunctivae and sclera normal, eyelids normal bilaterally.  Neck: Trachea midline. No cervical masses seen or palpated. Normal range of motion, supple. No meningeal signs elicited.  Cardiac: Regular rate and rhythm. S1-S2 present. No S3 or S4 present. No murmurs, rubs, or gallops heard. No edema or varicosities  were seen.   Lungs: Clear to auscultation with good aeration throughout. No wheezes, rales, or rhonchi heard. Patient's chest wall moved symmetrically with each respiratory effort. Patient was not making use of accessory muscles of respiration in breathing.  Abdomen: Slightly distended, soft nontender to palpation.  Patient has chronic abdominal pain which she states is unchanged from previous.  No rebound or guarding elicited. No organomegaly identified. No pulsatile abdominal masses identified.   Musculoskeletal:  no  pain with palpitation or movement of muscle, bone or joint , no obvious musculoskeletal deformities identified.  Neurologic: alert and awake answers questions appropriately. Moves all four extremities independently, no gross focal abnormalities identified. Normal strength and motor.  Skin: no rash or lesion seen, no palpable dermatologic lesions identified.  Mucous membranes appeared somewhat dry  Psychiatric: not anxious, delusional, or hallucinating.    Medical decision making:    Patient was last seen normal at 9 AM.  This would preclude the use of any sort of lytic treatment and her signs and symptoms are not consistent with any sort of large vessel occlusion that would be amenable to clot retrieval .  However as the patient could be having some sort of primary CNS problem per protocol she needs to be n.p.o. until evaluation is complete.    Due to hypotension the patient is given a 500 cc fluid bolus of 0.9 normal saline.  Repeat blood pressure improved 140/69.    EKG obtained (interpretation) Twelve-lead EKG sinus tachycardia rate 110.  Morphology of P waves unremarkable.  Patient has QRS configuration with LVH as well as some secondary repolarization abnormalities noted.  I sent no evidence of ST elevation or depression R wave progression was normal.  Interpreted as an abnormal EKG but not indicating acute ischemia or dysrhythmia    CT scan of the head obtained;Periventricular and subcortical  white matter changes are present which could be consistent with small vessel ischemia demyelination or gliosis.  There is no evidence of bleed shift or mass-effect.    Laboratory studies obtained (please see lab sheet for all results) significant findings included unremarkable CBC.  Patient appears to be somewhat dehydrated with creatinine 1.58 troponin elevated at 41 of uncertain significance.    Recheck showed the patient about the same.  She will be admitted for further evaluation of TIA symptoms.  As noted above she would not of met criteria for any sort of lytic or interventional treatment especially since she is now essentially back to her normal mental status.  Further care and hospital course per attending physician summary    Impression 1) TIA  2) chronic abdominal pain  3) elevated troponin  4) dehydration

## 2019-08-25 NOTE — ED TRIAGE NOTES
89 y/o female bib wheelchair by her sister for evaluation of ALOC and dizziness. Pt was last seen normal around 0930 today while being dropped off by her sister after [employer]. Sister states that she saw the patient again around 1500 and that she wasn't making any sense, and speaking in Georgian (which is not normal for her). Pt speaking in Georgian at times though she follows commands in English. No unilateral deficits noted.

## 2019-08-26 ENCOUNTER — APPOINTMENT (OUTPATIENT)
Dept: RADIOLOGY | Facility: MEDICAL CENTER | Age: 84
DRG: 069 | End: 2019-08-26
Attending: FAMILY MEDICINE
Payer: COMMERCIAL

## 2019-08-26 ENCOUNTER — APPOINTMENT (OUTPATIENT)
Dept: CARDIOLOGY | Facility: MEDICAL CENTER | Age: 84
DRG: 069 | End: 2019-08-26
Attending: FAMILY MEDICINE
Payer: COMMERCIAL

## 2019-08-26 PROBLEM — D69.6 THROMBOCYTOPENIA (HCC): Status: ACTIVE | Noted: 2019-08-26

## 2019-08-26 PROBLEM — G45.9 TIA (TRANSIENT ISCHEMIC ATTACK): Status: ACTIVE | Noted: 2019-08-26

## 2019-08-26 LAB
ALBUMIN SERPL BCP-MCNC: 3.5 G/DL (ref 3.2–4.9)
ALBUMIN/GLOB SERPL: 1.5 G/DL
ALP SERPL-CCNC: 65 U/L (ref 30–99)
ALT SERPL-CCNC: 9 U/L (ref 2–50)
ANION GAP SERPL CALC-SCNC: 8 MMOL/L (ref 0–11.9)
AST SERPL-CCNC: 19 U/L (ref 12–45)
BASOPHILS # BLD AUTO: 0.7 % (ref 0–1.8)
BASOPHILS # BLD: 0.04 K/UL (ref 0–0.12)
BILIRUB SERPL-MCNC: 0.6 MG/DL (ref 0.1–1.5)
BUN SERPL-MCNC: 21 MG/DL (ref 8–22)
CALCIUM SERPL-MCNC: 9.6 MG/DL (ref 8.5–10.5)
CHLORIDE SERPL-SCNC: 111 MMOL/L (ref 96–112)
CHOLEST SERPL-MCNC: 167 MG/DL (ref 100–199)
CO2 SERPL-SCNC: 23 MMOL/L (ref 20–33)
CREAT SERPL-MCNC: 1.22 MG/DL (ref 0.5–1.4)
EOSINOPHIL # BLD AUTO: 0.32 K/UL (ref 0–0.51)
EOSINOPHIL NFR BLD: 5.2 % (ref 0–6.9)
ERYTHROCYTE [DISTWIDTH] IN BLOOD BY AUTOMATED COUNT: 49.3 FL (ref 35.9–50)
EST. AVERAGE GLUCOSE BLD GHB EST-MCNC: 108 MG/DL
GLOBULIN SER CALC-MCNC: 2.4 G/DL (ref 1.9–3.5)
GLUCOSE SERPL-MCNC: 87 MG/DL (ref 65–99)
HBA1C MFR BLD: 5.4 % (ref 0–5.6)
HCT VFR BLD AUTO: 39.8 % (ref 37–47)
HDLC SERPL-MCNC: 47 MG/DL
HGB BLD-MCNC: 12.2 G/DL (ref 12–16)
IMM GRANULOCYTES # BLD AUTO: 0.03 K/UL (ref 0–0.11)
IMM GRANULOCYTES NFR BLD AUTO: 0.5 % (ref 0–0.9)
LDLC SERPL CALC-MCNC: 104 MG/DL
LV EJECT FRACT  99904: 60
LV EJECT FRACT MOD 2C 99903: 55.09
LV EJECT FRACT MOD 4C 99902: 71.33
LV EJECT FRACT MOD BP 99901: 59.7
LYMPHOCYTES # BLD AUTO: 1.75 K/UL (ref 1–4.8)
LYMPHOCYTES NFR BLD: 28.7 % (ref 22–41)
MCH RBC QN AUTO: 29.3 PG (ref 27–33)
MCHC RBC AUTO-ENTMCNC: 30.7 G/DL (ref 33.6–35)
MCV RBC AUTO: 95.4 FL (ref 81.4–97.8)
MONOCYTES # BLD AUTO: 0.52 K/UL (ref 0–0.85)
MONOCYTES NFR BLD AUTO: 8.5 % (ref 0–13.4)
NEUTROPHILS # BLD AUTO: 3.44 K/UL (ref 2–7.15)
NEUTROPHILS NFR BLD: 56.4 % (ref 44–72)
NRBC # BLD AUTO: 0 K/UL
NRBC BLD-RTO: 0 /100 WBC
PLATELET # BLD AUTO: 140 K/UL (ref 164–446)
PMV BLD AUTO: 10 FL (ref 9–12.9)
POTASSIUM SERPL-SCNC: 3.8 MMOL/L (ref 3.6–5.5)
PROT SERPL-MCNC: 5.9 G/DL (ref 6–8.2)
RBC # BLD AUTO: 4.17 M/UL (ref 4.2–5.4)
SODIUM SERPL-SCNC: 142 MMOL/L (ref 135–145)
TRIGL SERPL-MCNC: 81 MG/DL (ref 0–149)
WBC # BLD AUTO: 6.1 K/UL (ref 4.8–10.8)

## 2019-08-26 PROCEDURE — A9270 NON-COVERED ITEM OR SERVICE: HCPCS | Performed by: FAMILY MEDICINE

## 2019-08-26 PROCEDURE — 80061 LIPID PANEL: CPT

## 2019-08-26 PROCEDURE — 80053 COMPREHEN METABOLIC PANEL: CPT

## 2019-08-26 PROCEDURE — 770020 HCHG ROOM/CARE - TELE (206)

## 2019-08-26 PROCEDURE — 99232 SBSQ HOSP IP/OBS MODERATE 35: CPT | Performed by: INTERNAL MEDICINE

## 2019-08-26 PROCEDURE — 97165 OT EVAL LOW COMPLEX 30 MIN: CPT

## 2019-08-26 PROCEDURE — A9270 NON-COVERED ITEM OR SERVICE: HCPCS | Performed by: INTERNAL MEDICINE

## 2019-08-26 PROCEDURE — 97161 PT EVAL LOW COMPLEX 20 MIN: CPT

## 2019-08-26 PROCEDURE — 93306 TTE W/DOPPLER COMPLETE: CPT

## 2019-08-26 PROCEDURE — 36415 COLL VENOUS BLD VENIPUNCTURE: CPT

## 2019-08-26 PROCEDURE — 700102 HCHG RX REV CODE 250 W/ 637 OVERRIDE(OP): Performed by: FAMILY MEDICINE

## 2019-08-26 PROCEDURE — 70551 MRI BRAIN STEM W/O DYE: CPT

## 2019-08-26 PROCEDURE — 700102 HCHG RX REV CODE 250 W/ 637 OVERRIDE(OP): Performed by: INTERNAL MEDICINE

## 2019-08-26 PROCEDURE — 93306 TTE W/DOPPLER COMPLETE: CPT | Mod: 26 | Performed by: INTERNAL MEDICINE

## 2019-08-26 PROCEDURE — 700111 HCHG RX REV CODE 636 W/ 250 OVERRIDE (IP): Performed by: FAMILY MEDICINE

## 2019-08-26 PROCEDURE — 85025 COMPLETE CBC W/AUTO DIFF WBC: CPT

## 2019-08-26 RX ORDER — HYDRALAZINE HYDROCHLORIDE 20 MG/ML
20 INJECTION INTRAMUSCULAR; INTRAVENOUS
Status: DISCONTINUED | OUTPATIENT
Start: 2019-08-26 | End: 2019-08-28 | Stop reason: HOSPADM

## 2019-08-26 RX ORDER — DOXAZOSIN 2 MG/1
4 TABLET ORAL EVERY MORNING
Status: DISCONTINUED | OUTPATIENT
Start: 2019-08-26 | End: 2019-08-27

## 2019-08-26 RX ORDER — ENALAPRILAT 1.25 MG/ML
2.5 INJECTION INTRAVENOUS EVERY 6 HOURS PRN
Status: DISCONTINUED | OUTPATIENT
Start: 2019-08-26 | End: 2019-08-28 | Stop reason: HOSPADM

## 2019-08-26 RX ORDER — FAMOTIDINE 20 MG/1
20 TABLET, FILM COATED ORAL DAILY
Status: DISCONTINUED | OUTPATIENT
Start: 2019-08-26 | End: 2019-08-28 | Stop reason: HOSPADM

## 2019-08-26 RX ORDER — VALSARTAN 80 MG/1
320 TABLET ORAL
Status: DISCONTINUED | OUTPATIENT
Start: 2019-08-26 | End: 2019-08-28 | Stop reason: HOSPADM

## 2019-08-26 RX ORDER — SPIRONOLACTONE 25 MG/1
50 TABLET ORAL
Status: DISCONTINUED | OUTPATIENT
Start: 2019-08-26 | End: 2019-08-26

## 2019-08-26 RX ORDER — LABETALOL HYDROCHLORIDE 5 MG/ML
10 INJECTION, SOLUTION INTRAVENOUS EVERY 4 HOURS PRN
Status: DISCONTINUED | OUTPATIENT
Start: 2019-08-26 | End: 2019-08-28 | Stop reason: HOSPADM

## 2019-08-26 RX ORDER — SPIRONOLACTONE 25 MG/1
50 TABLET ORAL
Status: DISCONTINUED | OUTPATIENT
Start: 2019-08-27 | End: 2019-08-28 | Stop reason: HOSPADM

## 2019-08-26 RX ORDER — VALSARTAN 80 MG/1
320 TABLET ORAL
Status: DISCONTINUED | OUTPATIENT
Start: 2019-08-27 | End: 2019-08-26

## 2019-08-26 RX ORDER — PRAZOSIN HYDROCHLORIDE 2 MG/1
2 CAPSULE ORAL EVERY EVENING
Status: DISCONTINUED | OUTPATIENT
Start: 2019-08-26 | End: 2019-08-26

## 2019-08-26 RX ADMIN — ATORVASTATIN CALCIUM 40 MG: 40 TABLET, FILM COATED ORAL at 16:49

## 2019-08-26 RX ADMIN — SPIRONOLACTONE 50 MG: 25 TABLET ORAL at 18:44

## 2019-08-26 RX ADMIN — HYDRALAZINE HYDROCHLORIDE 20 MG: 20 INJECTION INTRAMUSCULAR; INTRAVENOUS at 23:42

## 2019-08-26 RX ADMIN — FAMOTIDINE 20 MG: 20 TABLET ORAL at 05:35

## 2019-08-26 RX ADMIN — ENALAPRILAT 2.5 MG: 1.25 INJECTION INTRAVENOUS at 22:28

## 2019-08-26 RX ADMIN — VALSARTAN 320 MG: 80 TABLET, FILM COATED ORAL at 22:04

## 2019-08-26 RX ADMIN — SENNOSIDES, DOCUSATE SODIUM 2 TABLET: 50; 8.6 TABLET, FILM COATED ORAL at 16:50

## 2019-08-26 RX ADMIN — DOXAZOSIN 4 MG: 2 TABLET ORAL at 18:44

## 2019-08-26 ASSESSMENT — ENCOUNTER SYMPTOMS
MYALGIAS: 0
FOCAL WEAKNESS: 0
HEARTBURN: 0
SPEECH CHANGE: 0
TINGLING: 0
NAUSEA: 0
NECK PAIN: 0
VOMITING: 0
BACK PAIN: 0
PALPITATIONS: 0
HEMOPTYSIS: 0
PHOTOPHOBIA: 0
EYE PAIN: 0
DIZZINESS: 0
ORTHOPNEA: 0
DEPRESSION: 0
HEADACHES: 0
DOUBLE VISION: 0
SENSORY CHANGE: 0
COUGH: 0
SHORTNESS OF BREATH: 0
ABDOMINAL PAIN: 0
FEVER: 0
CHILLS: 0
SPUTUM PRODUCTION: 0
SPEECH CHANGE: 1
BLURRED VISION: 0

## 2019-08-26 ASSESSMENT — COGNITIVE AND FUNCTIONAL STATUS - GENERAL
MOBILITY SCORE: 24
SUGGESTED CMS G CODE MODIFIER MOBILITY: CH
SUGGESTED CMS G CODE MODIFIER DAILY ACTIVITY: CH
DAILY ACTIVITIY SCORE: 24

## 2019-08-26 ASSESSMENT — GAIT ASSESSMENTS
GAIT LEVEL OF ASSIST: SUPERVISED
DISTANCE (FEET): 300

## 2019-08-26 ASSESSMENT — ACTIVITIES OF DAILY LIVING (ADL): TOILETING: INDEPENDENT

## 2019-08-26 ASSESSMENT — LIFESTYLE VARIABLES: SUBSTANCE_ABUSE: 0

## 2019-08-26 NOTE — PROGRESS NOTES
Monitor summary: SR/SB 46-67 reaching as low as 37, MD 0.20, QRS 0.08, QT 0.40 per strip from monitor room.

## 2019-08-26 NOTE — DISCHARGE PLANNING
Transitional Care Coordination     Referral from Dr. Bueno  Presented to ED with altered level of consciousness and dizziness.  Dx: TIA; chronic abdominal pain; elevated troponin; dehydration.   PT ambulation w/ min assist;   Current documentation does not support therapy need for IRF level of care as pt is walking with min assist per chart review.   No Physiatry for a consult ordered per protocol.   Thank you for referral.

## 2019-08-26 NOTE — CARE PLAN
Problem: Safety  Goal: Will remain free from injury  Outcome: PROGRESSING AS EXPECTED     Problem: Knowledge Deficit  Goal: Knowledge of disease process/condition, treatment plan, diagnostic tests, and medications will improve  Outcome: PROGRESSING AS EXPECTED  Goal: Knowledge of the prescribed therapeutic regimen will improve  Outcome: PROGRESSING AS EXPECTED     Problem: Pain Management  Goal: Pain level will decrease to patient's comfort goal  Outcome: PROGRESSING AS EXPECTED     Problem: Safety:  Goal: Will remain free from injury  Outcome: PROGRESSING AS EXPECTED     Problem: Nutritional:  Goal: Maintenance of adequate nutrition will improve  Outcome: PROGRESSING AS EXPECTED     Problem: Urinary:  Goal: Ability to maintain continence will improve  Outcome: PROGRESSING AS EXPECTED     Problem: Mobility:  Goal: Capacity to carry out activities will improve  Outcome: PROGRESSING AS EXPECTED  Goal: Mobility will improve  Outcome: PROGRESSING AS EXPECTED     Problem: Knowledge Deficit:  Goal: Knowledge of disease process/condition, treatment plan, diagnostic tests, and medications will improve  Outcome: PROGRESSING AS EXPECTED

## 2019-08-26 NOTE — ASSESSMENT & PLAN NOTE
Head CT was negative for any acute intracranial findings  MRI negative for CVA   Echocardiogram unremarkable   Resume home medication s  Continue statin /asa

## 2019-08-26 NOTE — DISCHARGE PLANNING
Care Transition Team Assessment    The information gathered for this assessment was provided by pt and chart review. Pt confirmed that the information on the facesheet was accurate. Pt lives in a one story house. Pt's main support system is her sister, Margarita. Pt states to be independent with ADL's/IADL's prior to being admitted. Pt has a cane but feels that she doesn't need it.     No discharge plan in place at the time of this assessment.          Information Source  Orientation : Oriented x 4  Information Given By: Patient(Tessy Burns)  Who is responsible for making decisions for patient? : Patient    Elopement Risk  Legal Hold: No  Ambulatory or Self Mobile in Wheelchair: Yes  Disoriented: No  Psychiatric Symptoms: None  History of Wandering: No  Elopement this Admit: No  Vocalizing Wanting to Leave: No  Displays Behaviors, Body Language Wanting to Leave: No-Not at Risk for Elopement  Elopement Risk: Not at Risk for Elopement    Interdisciplinary Discharge Planning  Lives with - Patient's Self Care Capacity: Alone and Able to Care For Self  Patient or legal guardian wants to designate a caregiver (see row info): No  Housing / Facility: [address] [facility]  Prior Services: None    Discharge Preparedness  What is your plan after discharge?: Uncertain - pending medical team collaboration  What are your discharge supports?: Sibling  Prior Functional Level: Independent with Activities of Daily Living  Difficulity with ADLs: Walking  Difficulty with ADLs Comment: (Uses cane and walking stick)  Difficulity with IADLs: None    Functional Assesment  Prior Functional Level: Independent with Activities of Daily Living    Finances  Financial Barriers to Discharge: No  Prescription Coverage: Yes    Vision / Hearing Impairment  Vision Impairment : Yes  Right Eye Vision: Wears Glasses  Left Eye Vision: Wears Glasses  Hearing Impairment : Yes  Hearing Impairment: Both Ears  Does Pt Need Special Equipment for the Hearing Impaired?:  No    Domestic Abuse  Have you ever been the victim of abuse or violence?: No  Physical Abuse or Sexual Abuse: No  Verbal Abuse or Emotional Abuse: No  Possible Abuse Reported to:: Not Applicable    Psychological Assessment  History of Substance Abuse: None  History of Psychiatric Problems: No    Discharge Risks or Barriers  Discharge risks or barriers?: Lives alone, no community support  Patient risk factors: Vulnerable adult    Anticipated Discharge Information  Anticipated discharge disposition: Discharge needs currently unknown

## 2019-08-26 NOTE — H&P
Hospital Medicine History & Physical Note    Date of Service  8/25/2019    Primary Care Physician  Jaswinder Iqbal M.D.    Consultants  None    Code Status  Full code    Chief Complaint  Altered mental status and garbled speech    History of Presenting Illness  88 y.o. female with past medical history of hypertension and fibromyalgia was brought in by her family members due to altered mental status and garbled speech.  Duration of those symptoms is unknown.  In the ER patient speech was intact and her mental status was back to baseline.  Head CT was negative for any acute intracranial abnormalities.  Denies any weakness or numbness.  Denies any difficulty swallowing.    Review of Systems  Review of Systems   Constitutional: Negative for chills and fever.   HENT: Negative for ear pain, hearing loss and tinnitus.    Eyes: Negative for blurred vision, double vision, photophobia and pain.   Respiratory: Negative for cough, hemoptysis and sputum production.    Cardiovascular: Negative for chest pain, palpitations and orthopnea.   Gastrointestinal: Negative for abdominal pain, heartburn, nausea and vomiting.   Genitourinary: Negative for dysuria, frequency and urgency.   Musculoskeletal: Negative for back pain, myalgias and neck pain.   Skin: Negative for rash.   Neurological: Positive for speech change. Negative for dizziness, focal weakness and headaches.   Psychiatric/Behavioral: Negative for depression, substance abuse and suicidal ideas.       Past Medical History   has a past medical history of Chronic venous insufficiency (5/26/2016), Hypertension (5/26/2016), LLQ abdominal mass (5/26/2016), Palpitations (5/26/2016), and WILLIAN (renal artery stenosis) (HCC) (5/26/2016).    Surgical History   has no past surgical history on file.     Family History  family history includes Cancer in her mother, unknown relative, and unknown relative; Diabetes in her father.     Social History   reports that she has never smoked.  She has never used smokeless tobacco. She reports that she does not drink alcohol or use drugs.    Allergies  Allergies   Allergen Reactions   • Acyclovir    • Ciprofloxacin    • Citalopram    • Hydralazine Hcl    • Lyrica    • Neurontin [Gabapentin]    • Norvasc [Amlodipine]    • Morphine    • Tramadol      Drowsy, dizzy, depression       Medications  Prior to Admission Medications   Prescriptions Last Dose Informant Patient Reported? Taking?   Eplerenone 50 MG Tab 8/25/2019 at Unknown time  Yes Yes   Sig: Take 50 mg by mouth every day.   aspirin (ASA) 81 MG CHEW chewable tablet unknown at Unknown time Patient Yes No   Sig: Take 81 mg by mouth every day.   doxazosin (CARDURA) 2 MG Tab 8/25/2019 at am Patient Yes No   Sig: Take 4 mg by mouth every morning.   pantoprazole (PROTONIX) 20 MG tablet 8/25/2019 at am  No No   Sig: TAKE 1 TABLET BY MOUTH TWICE A DAY   raNITidine (ZANTAC) 150 MG Tab 8/24/2019 at Unknown time  No No   Sig: TAKE 1 TABLET BY MOUTH EVERY DAY   valsartan (DIOVAN) 320 MG tablet 8/25/2019 at am Patient Yes No   Sig: Take 320 mg by mouth every morning.      Facility-Administered Medications: None       Physical Exam  Temp:  [36.4 °C (97.5 °F)-36.6 °C (97.8 °F)] 36.5 °C (97.7 °F)  Pulse:  [] 55  Resp:  [16-18] 16  BP: ()/() 180/94  SpO2:  [94 %-100 %] 97 %    Physical Exam   Constitutional: She is oriented to person, place, and time. No distress.   HENT:   Head: Normocephalic and atraumatic.   Mouth/Throat: No oropharyngeal exudate.   Eyes: Pupils are equal, round, and reactive to light. Conjunctivae are normal. No scleral icterus.   Neck: Normal range of motion. No tracheal deviation present. No thyromegaly present.   Cardiovascular: Normal rate and regular rhythm. Exam reveals no gallop and no friction rub.   Pulmonary/Chest: Effort normal. No respiratory distress. She has no wheezes.   Abdominal: Soft. She exhibits no distension. There is no tenderness.   Musculoskeletal: She  exhibits no tenderness or deformity.   Neurological: She is alert and oriented to person, place, and time. No cranial nerve deficit.   Skin: Skin is warm and dry. She is not diaphoretic.   Psychiatric: She has a normal mood and affect.       Laboratory:  Recent Labs     08/25/19  1655   WBC 8.1   RBC 4.38   HEMOGLOBIN 12.9   HEMATOCRIT 41.1   MCV 93.8   MCH 29.5   MCHC 31.4*   RDW 49.0   PLATELETCT 143*   MPV 9.9     Recent Labs     08/25/19  1655   SODIUM 143   POTASSIUM 3.9   CHLORIDE 111   CO2 21   GLUCOSE 102*   BUN 22   CREATININE 1.58*   CALCIUM 10.0     Recent Labs     08/25/19  1655   ALTSGPT 10   ASTSGOT 21   ALKPHOSPHAT 71   TBILIRUBIN 0.4   GLUCOSE 102*         No results for input(s): NTPROBNP in the last 72 hours.      Recent Labs     08/25/19  1655   TROPONINT 41*       Urinalysis:    No results found     Imaging:  CT-HEAD W/O   Final Result      No acute intracranial abnormality is identified.      Atrophy      There are periventricular and subcortical white matter changes present.  This finding is nonspecific and could be from previous small vessel ischemia, demyelination, or gliosis.      EC-ECHOCARDIOGRAM COMPLETE W/O CONT    (Results Pending)   MR-BRAIN-W/O    (Results Pending)         Assessment/Plan:  I anticipate this patient is appropriate for observation status at this time.    * TIA (transient ischemic attack)- (present on admission)  Assessment & Plan  Head CT was negative for any acute intracranial findings  MRI of the brain pending  Echocardiogram  Permissive hypertension for 24 hours and less acute stroke ruled out.    RUDY (acute kidney injury) (HCC)- (present on admission)  Assessment & Plan  Avoid nephrotoxins  Renal dose all medications  IV fluids    Thrombocytopenia (HCC)- (present on admission)  Assessment & Plan  Chronic  Continue to follow      VTE prophylaxis: SCD's

## 2019-08-26 NOTE — CARE PLAN
Problem: Nutritional:  Goal: Maintenance of adequate nutrition will improve  Outcome: PROGRESSING AS EXPECTED     Problem: Urinary:  Goal: Ability to maintain continence will improve  Outcome: PROGRESSING AS EXPECTED     Problem: Mobility:  Goal: Capacity to carry out activities will improve  Outcome: PROGRESSING AS EXPECTED  Goal: Mobility will improve  Outcome: PROGRESSING AS EXPECTED

## 2019-08-26 NOTE — PROGRESS NOTES
2 RN skin check:    Performed with: SANDRO Youssef    Sacrum, elbows, heels, occiput, ears, and shoulders intact and blanching. Old surgical scar noted to cervical/neck area. LE's dry and flaky, turgor loose and fragile. Varicose veins to bilateral veins. Ambulatory with minimal assistance. Education provided on activity and mobilization encouraged.

## 2019-08-27 ENCOUNTER — PATIENT OUTREACH (OUTPATIENT)
Dept: HEALTH INFORMATION MANAGEMENT | Facility: OTHER | Age: 84
End: 2019-08-27

## 2019-08-27 PROBLEM — M79.89 LEG SWELLING: Status: RESOLVED | Noted: 2018-01-11 | Resolved: 2019-08-27

## 2019-08-27 PROBLEM — G45.9 TIA (TRANSIENT ISCHEMIC ATTACK): Status: RESOLVED | Noted: 2019-08-26 | Resolved: 2019-08-27

## 2019-08-27 PROBLEM — N17.9 AKI (ACUTE KIDNEY INJURY) (HCC): Status: RESOLVED | Noted: 2018-08-17 | Resolved: 2019-08-27

## 2019-08-27 PROBLEM — I95.1 ORTHOSTATIC HYPOTENSION: Status: ACTIVE | Noted: 2019-08-27

## 2019-08-27 PROBLEM — R15.1 FECAL SMEARING: Status: RESOLVED | Noted: 2019-01-03 | Resolved: 2019-08-27

## 2019-08-27 PROBLEM — D64.9 ANEMIA: Status: RESOLVED | Noted: 2018-08-17 | Resolved: 2019-08-27

## 2019-08-27 LAB
ALBUMIN SERPL BCP-MCNC: 3.5 G/DL (ref 3.2–4.9)
ALBUMIN/GLOB SERPL: 1.5 G/DL
ALP SERPL-CCNC: 63 U/L (ref 30–99)
ALT SERPL-CCNC: 9 U/L (ref 2–50)
ANION GAP SERPL CALC-SCNC: 8 MMOL/L (ref 0–11.9)
AST SERPL-CCNC: 18 U/L (ref 12–45)
BASOPHILS # BLD AUTO: 0.3 % (ref 0–1.8)
BASOPHILS # BLD: 0.02 K/UL (ref 0–0.12)
BILIRUB SERPL-MCNC: 0.7 MG/DL (ref 0.1–1.5)
BUN SERPL-MCNC: 22 MG/DL (ref 8–22)
CALCIUM SERPL-MCNC: 9.7 MG/DL (ref 8.5–10.5)
CHLORIDE SERPL-SCNC: 111 MMOL/L (ref 96–112)
CHOLEST SERPL-MCNC: 146 MG/DL (ref 100–199)
CO2 SERPL-SCNC: 24 MMOL/L (ref 20–33)
CREAT SERPL-MCNC: 0.94 MG/DL (ref 0.5–1.4)
EKG IMPRESSION: NORMAL
EOSINOPHIL # BLD AUTO: 0.21 K/UL (ref 0–0.51)
EOSINOPHIL NFR BLD: 2.7 % (ref 0–6.9)
ERYTHROCYTE [DISTWIDTH] IN BLOOD BY AUTOMATED COUNT: 46.7 FL (ref 35.9–50)
GLOBULIN SER CALC-MCNC: 2.3 G/DL (ref 1.9–3.5)
GLUCOSE SERPL-MCNC: 101 MG/DL (ref 65–99)
HCT VFR BLD AUTO: 39.9 % (ref 37–47)
HDLC SERPL-MCNC: 46 MG/DL
HGB BLD-MCNC: 13 G/DL (ref 12–16)
IMM GRANULOCYTES # BLD AUTO: 0.03 K/UL (ref 0–0.11)
IMM GRANULOCYTES NFR BLD AUTO: 0.4 % (ref 0–0.9)
LDLC SERPL CALC-MCNC: 85 MG/DL
LYMPHOCYTES # BLD AUTO: 1.32 K/UL (ref 1–4.8)
LYMPHOCYTES NFR BLD: 16.7 % (ref 22–41)
MCH RBC QN AUTO: 30 PG (ref 27–33)
MCHC RBC AUTO-ENTMCNC: 32.6 G/DL (ref 33.6–35)
MCV RBC AUTO: 91.9 FL (ref 81.4–97.8)
MONOCYTES # BLD AUTO: 0.67 K/UL (ref 0–0.85)
MONOCYTES NFR BLD AUTO: 8.5 % (ref 0–13.4)
NEUTROPHILS # BLD AUTO: 5.65 K/UL (ref 2–7.15)
NEUTROPHILS NFR BLD: 71.4 % (ref 44–72)
NRBC # BLD AUTO: 0 K/UL
NRBC BLD-RTO: 0 /100 WBC
PLATELET # BLD AUTO: 125 K/UL (ref 164–446)
PMV BLD AUTO: 10 FL (ref 9–12.9)
POTASSIUM SERPL-SCNC: 3.6 MMOL/L (ref 3.6–5.5)
PROT SERPL-MCNC: 5.8 G/DL (ref 6–8.2)
RBC # BLD AUTO: 4.34 M/UL (ref 4.2–5.4)
SODIUM SERPL-SCNC: 143 MMOL/L (ref 135–145)
TRIGL SERPL-MCNC: 77 MG/DL (ref 0–149)
WBC # BLD AUTO: 7.9 K/UL (ref 4.8–10.8)

## 2019-08-27 PROCEDURE — A9270 NON-COVERED ITEM OR SERVICE: HCPCS | Performed by: INTERNAL MEDICINE

## 2019-08-27 PROCEDURE — 700105 HCHG RX REV CODE 258: Performed by: INTERNAL MEDICINE

## 2019-08-27 PROCEDURE — 700102 HCHG RX REV CODE 250 W/ 637 OVERRIDE(OP): Performed by: INTERNAL MEDICINE

## 2019-08-27 PROCEDURE — 80053 COMPREHEN METABOLIC PANEL: CPT

## 2019-08-27 PROCEDURE — A9270 NON-COVERED ITEM OR SERVICE: HCPCS | Performed by: HOSPITALIST

## 2019-08-27 PROCEDURE — 99232 SBSQ HOSP IP/OBS MODERATE 35: CPT | Performed by: INTERNAL MEDICINE

## 2019-08-27 PROCEDURE — 85025 COMPLETE CBC W/AUTO DIFF WBC: CPT

## 2019-08-27 PROCEDURE — A9270 NON-COVERED ITEM OR SERVICE: HCPCS | Performed by: FAMILY MEDICINE

## 2019-08-27 PROCEDURE — 80061 LIPID PANEL: CPT

## 2019-08-27 PROCEDURE — 93010 ELECTROCARDIOGRAM REPORT: CPT | Performed by: INTERNAL MEDICINE

## 2019-08-27 PROCEDURE — 36415 COLL VENOUS BLD VENIPUNCTURE: CPT

## 2019-08-27 PROCEDURE — 93005 ELECTROCARDIOGRAM TRACING: CPT | Performed by: INTERNAL MEDICINE

## 2019-08-27 PROCEDURE — 700102 HCHG RX REV CODE 250 W/ 637 OVERRIDE(OP): Performed by: HOSPITALIST

## 2019-08-27 PROCEDURE — 770020 HCHG ROOM/CARE - TELE (206)

## 2019-08-27 PROCEDURE — 700102 HCHG RX REV CODE 250 W/ 637 OVERRIDE(OP): Performed by: FAMILY MEDICINE

## 2019-08-27 RX ORDER — SODIUM CHLORIDE 9 MG/ML
INJECTION, SOLUTION INTRAVENOUS CONTINUOUS
Status: DISCONTINUED | OUTPATIENT
Start: 2019-08-27 | End: 2019-08-28

## 2019-08-27 RX ORDER — DOXAZOSIN 2 MG/1
4 TABLET ORAL EVERY MORNING
Status: DISCONTINUED | OUTPATIENT
Start: 2019-08-27 | End: 2019-08-28 | Stop reason: HOSPADM

## 2019-08-27 RX ORDER — EPLERENONE 50 MG/1
50 TABLET, FILM COATED ORAL DAILY
Status: DISCONTINUED | OUTPATIENT
Start: 2019-08-27 | End: 2019-08-27

## 2019-08-27 RX ORDER — VALSARTAN 80 MG/1
320 TABLET ORAL EVERY MORNING
Status: DISCONTINUED | OUTPATIENT
Start: 2019-08-27 | End: 2019-08-27

## 2019-08-27 RX ORDER — ATORVASTATIN CALCIUM 40 MG/1
40 TABLET, FILM COATED ORAL EVERY EVENING
Qty: 30 TAB | Refills: 0 | Status: SHIPPED | OUTPATIENT
Start: 2019-08-27 | End: 2021-07-03

## 2019-08-27 RX ADMIN — SODIUM CHLORIDE: 9 INJECTION, SOLUTION INTRAVENOUS at 18:38

## 2019-08-27 RX ADMIN — VALSARTAN 320 MG: 80 TABLET, FILM COATED ORAL at 06:34

## 2019-08-27 RX ADMIN — ATORVASTATIN CALCIUM 40 MG: 40 TABLET, FILM COATED ORAL at 16:47

## 2019-08-27 RX ADMIN — DOXAZOSIN 4 MG: 2 TABLET ORAL at 06:34

## 2019-08-27 RX ADMIN — SENNOSIDES, DOCUSATE SODIUM 2 TABLET: 50; 8.6 TABLET, FILM COATED ORAL at 06:33

## 2019-08-27 RX ADMIN — SODIUM CHLORIDE: 9 INJECTION, SOLUTION INTRAVENOUS at 12:30

## 2019-08-27 RX ADMIN — FAMOTIDINE 20 MG: 20 TABLET ORAL at 06:33

## 2019-08-27 RX ADMIN — ASPIRIN 81 MG: 81 TABLET, COATED ORAL at 06:33

## 2019-08-27 ASSESSMENT — ENCOUNTER SYMPTOMS
TINGLING: 0
WEAKNESS: 1
SPEECH CHANGE: 0
COUGH: 0
BLURRED VISION: 0
VOMITING: 0
NAUSEA: 0
MYALGIAS: 0
ABDOMINAL PAIN: 0
HEARTBURN: 0
NECK PAIN: 0
CHILLS: 0
SENSORY CHANGE: 0
DIZZINESS: 0
SHORTNESS OF BREATH: 0
HEADACHES: 0
FEVER: 0
DOUBLE VISION: 0
PALPITATIONS: 0

## 2019-08-27 NOTE — PROGRESS NOTES
Paged Dr. Bueno to inform of pt's resistant high blood pressure, hx of renal artery stenosis. Last /105, HR 52. Pt MRI was negative for an acute stroke but shows chronic. Pt received 4 mg Cardura and 50 mg aldactone this evening, valsarton ordered for morning.RN suggested to change PRN permissive HTN parameters. Dr. Bueno to place orders.

## 2019-08-27 NOTE — PROGRESS NOTES
Monitor Summary: SB-SR 52-80, ND .18, QRS .08, QT .44 with rare PAC and HR as low as 39 per strip from monitor room

## 2019-08-27 NOTE — PROGRESS NOTES
Pt was agreeable to night time valsartan, refused IV vasotec. Pt was agreeable after much education and encouragement. Will recheck BP in one hour.

## 2019-08-27 NOTE — ASSESSMENT & PLAN NOTE
Very resistant   Possible renal stenosis  Follows with Dr. Bloch  Many side effects from most of hypertensive treatment   Allergy to amlodipine and hydralazine   Confusion from spironolactone (eplerenone not available in the hospital)  Hr low for BB   Resume doxazosin/ valsartan at this time   Refusing spironolactone while she is here

## 2019-08-27 NOTE — THERAPY
"Occupational Therapy Evaluation completed.   Functional Status:  Pt currently able to perform basic ADL's & functional mobility with supervision.  Pt reports she receives Meals on Wheels & her sister visits every day.  Pt has help with her yard work but stated she would like to hire someone to help with the house cleaning.  Pt's sister drives her to the grocery store & appt.    Plan of Care: Patient with no further skilled OT needs in the acute care setting at this time  Discharge Recommendations:  Equipment: No Equipment Needed. Post-acute therapy Currently anticipate no further skilled therapy needs once patient is discharged from the inpatient setting.    See \"Rehab Therapy-Acute\" Patient Summary Report for complete documentation.    "

## 2019-08-27 NOTE — PROGRESS NOTES
Paged Dr. Jimenez to clarify morning BP meds, valsartan and cardura not scheduled for 0600 until 8/28. Orders received.

## 2019-08-27 NOTE — PROGRESS NOTES
Hospital Medicine Daily Progress Note    Date of Service  8/26/2019    Chief Complaint  88 y.o. female admitted 8/25/2019 with Altered mental status, garbled speech    Hospital Course    80-year-old female past medical history of resistant hypertension, questionable renal artery stenosis, fibromyalgia was brought by family members due to altered mental status and speech changes.  On arrival in the ER her speech was back to her baseline and mental status was back to her baseline.  Head CT was negative for any acute intracranial abnormalities.  MRI negative.  Echocardiogram unremarkable.  She was started on stroke protocol including permissive hypertension, statin, aspirin.  Arrival blood pressure slightly low but it continues to remain extremely high during hospital stay.  With fluids IV fluids were discontinued.  Her kidney function was improved.  Resume her home medications for  hypertension and patient was kept additional night for close monitoring of resistant hypertension, change in mental status, tele monitoring.      Interval Problem Update  Feeling better. mentation back to her baseline. Speech normal. No other complains     Consultants/Specialty  None     Code Status  Full code     Disposition  Inpatient     Review of Systems  Review of Systems   Constitutional: Negative for chills, fever and malaise/fatigue.   HENT: Negative for hearing loss.    Eyes: Negative for blurred vision and double vision.   Respiratory: Negative for cough and shortness of breath.    Cardiovascular: Negative for chest pain, palpitations and leg swelling.   Gastrointestinal: Negative for abdominal pain, heartburn, nausea and vomiting.   Genitourinary: Negative for dysuria and urgency.   Musculoskeletal: Negative for myalgias and neck pain.   Neurological: Negative for dizziness, tingling, sensory change, speech change and headaches.        Physical Exam  Temp:  [36.3 °C (97.4 °F)-36.7 °C (98.1 °F)] 36.7 °C (98.1 °F)  Pulse:  [52-87]  65  Resp:  [16-17] 16  BP: (156-208)/() 196/102  SpO2:  [95 %-100 %] 97 %    Physical Exam   Constitutional: She is oriented to person, place, and time. She appears well-developed and well-nourished. No distress.   HENT:   Head: Normocephalic and atraumatic.   Eyes: Pupils are equal, round, and reactive to light. EOM are normal. Right eye exhibits no discharge. Left eye exhibits no discharge. No scleral icterus.   Neck: Normal range of motion.   Cardiovascular: Normal rate, regular rhythm and normal heart sounds.   No murmur heard.  Pulmonary/Chest: Effort normal and breath sounds normal. No respiratory distress. She has no wheezes. She has no rales.   Abdominal: Soft. Bowel sounds are normal. She exhibits no distension. There is no tenderness. There is no rebound.   Musculoskeletal: Normal range of motion. She exhibits no edema.   Neurological: She is alert and oriented to person, place, and time. She displays normal reflexes. No cranial nerve deficit. She exhibits normal muscle tone. Coordination normal.   Skin: Skin is warm and dry.   Psychiatric: She has a normal mood and affect. Her behavior is normal. Thought content normal.       Fluids    Intake/Output Summary (Last 24 hours) at 8/26/2019 1801  Last data filed at 8/26/2019 1230  Gross per 24 hour   Intake 1010 ml   Output --   Net 1010 ml       Laboratory  Recent Labs     08/25/19  1655 08/26/19  0355   WBC 8.1 6.1   RBC 4.38 4.17*   HEMOGLOBIN 12.9 12.2   HEMATOCRIT 41.1 39.8   MCV 93.8 95.4   MCH 29.5 29.3   MCHC 31.4* 30.7*   RDW 49.0 49.3   PLATELETCT 143* 140*   MPV 9.9 10.0     Recent Labs     08/25/19  1655 08/26/19  0355   SODIUM 143 142   POTASSIUM 3.9 3.8   CHLORIDE 111 111   CO2 21 23   GLUCOSE 102* 87   BUN 22 21   CREATININE 1.58* 1.22   CALCIUM 10.0 9.6             Recent Labs     08/26/19  0355   TRIGLYCERIDE 81   HDL 47   *       Imaging  EC-ECHOCARDIOGRAM COMPLETE W/O CONT   Final Result      MR-BRAIN-W/O   Final Result      1.   No evidence of acute territorial infarct, intracranial hemorrhage or mass lesion.   2.  Chronic left cerebellar lacunar infarct.   3.  Moderate diffuse cerebral substance loss.   4.  Mild microangiopathic ischemic change versus demyelination or gliosis.      CT-HEAD W/O   Final Result      No acute intracranial abnormality is identified.      Atrophy      There are periventricular and subcortical white matter changes present.  This finding is nonspecific and could be from previous small vessel ischemia, demyelination, or gliosis.           Assessment/Plan  * TIA (transient ischemic attack)- (present on admission)  Assessment & Plan  Head CT was negative for any acute intracranial findings  MRI negative for CVA   Echocardiogram unremarkable   Resume home medication s  Continue statin /asa     RUDY (acute kidney injury) (HCC)- (present on admission)  Assessment & Plan  Improving   Avoid nephrotoxins  Renal dose all medications  Stop IV fluid. BP very elevated     Thrombocytopenia (HCC)- (present on admission)  Assessment & Plan  Chronic  Continue to follow    Essential hypertension  Assessment & Plan  Very resistant   Possible renal stenosis  Follows with Dr. Bloch  Many side effects from most of hypertensive treatment   Allergy to amlodipine and hydralazine   Confusion from spironolactone   Hr low for BB   Resume doxazosin/eplerenone and valsartan at this time     WILLIAN (renal artery stenosis) (HCC)- (present on admission)  Assessment & Plan  Per chart review. US 2016  Follows with Dr. Bloch for resistant high blood pressure          VTE prophylaxis: SCD

## 2019-08-27 NOTE — CARE PLAN
Problem: Safety  Goal: Will remain free from falls  Outcome: PROGRESSING AS EXPECTED     Problem: Mobility:  Goal: Capacity to carry out activities will improve  Outcome: PROGRESSING AS EXPECTED

## 2019-08-27 NOTE — PROGRESS NOTES
Patient sitting up in chair. Began having chest pain, SOB, back pain, and feeling dizzy. Upon getting vital signs pt was hypotensive. Myself and CNA put pt back in bed. She stated she felt better after laying down. Got orthostatic vitals on pt. Let Dr. Lancaster know the results of the vital signs. Ordered a stat EKG. Will continue to monitor pt.

## 2019-08-27 NOTE — ASSESSMENT & PLAN NOTE
significant orthostatic positive today (08/27)   Possible giving many BP meds yesterday?? Doxazosin ?? Hypovolemic/euvolemic   Start IV fluid at this time   If stopping doxazosin BP significantly high when she is lying   No safe discharge. Continue to monitor

## 2019-08-27 NOTE — CARE PLAN
Problem: Venous Thromboembolism (VTW)/Deep Vein Thrombosis (DVT) Prevention:  Goal: Patient will participate in Venous Thrombosis (VTE)/Deep Vein Thrombosis (DVT)Prevention Measures  Outcome: PROGRESSING AS EXPECTED     SCDs in place     Problem: Communication:  Goal: The ability to communicate needs accurately and effectively will improve  Outcome: PROGRESSING AS EXPECTED     Stroke Book provided for pt and reviewed at bedside   Educated patient about modifiable risk factors related to stroke such as:  High blood pressure   High cholesterol  Atherosclerosis

## 2019-08-27 NOTE — PROGRESS NOTES
Hospital Medicine Daily Progress Note    Date of Service  8/27/2019    Chief Complaint  88 y.o. female admitted 8/25/2019 with Altered mental status, garbled speech    Hospital Course    80-year-old female past medical history of resistant hypertension, questionable renal artery stenosis, fibromyalgia was brought by family members due to altered mental status and speech changes.  On arrival in the ER her speech was back to her baseline and mental status was back to her baseline.  Head CT was negative for any acute intracranial abnormalities.  MRI negative.  Echocardiogram unremarkable.  She was started on stroke protocol including permissive hypertension, statin, aspirin.  Arrival blood pressure slightly low but it continues to remain extremely high during hospital stay.  fluids IV fluids were discontinued since BP was significantly elevated.  Her kidney function was improved.  Resumed her home medications for  hypertension and patient was kept additional night for close monitoring of resistant hypertension, change in mental status, tele monitoring. Following day 08/27 while pt was prepared for discharge noted significant orthostatic hypotension after starting home blood pressure meds. IV fluid started again and another close monitoring. Pt refused to take spironolactone and eplerenone not available. EKG junctional rhythm, but similar as before.          Interval Problem Update  Feeling better. mentation back to her baseline. Speech normal. No other complains   08/27- feeling more unsteady today. No other complains or pain.     Consultants/Specialty  None     Code Status  Full code     Disposition  Inpatient     Review of Systems  Review of Systems   Constitutional: Negative for chills, fever and malaise/fatigue.   HENT: Negative for hearing loss.    Eyes: Negative for blurred vision and double vision.   Respiratory: Negative for cough and shortness of breath.    Cardiovascular: Negative for chest pain, palpitations  and leg swelling.   Gastrointestinal: Negative for abdominal pain, heartburn, nausea and vomiting.   Genitourinary: Negative for dysuria and urgency.   Musculoskeletal: Negative for myalgias and neck pain.   Neurological: Positive for weakness. Negative for dizziness, tingling, sensory change, speech change and headaches.        Physical Exam  Temp:  [36.2 °C (97.2 °F)-37.1 °C (98.7 °F)] 36.8 °C (98.3 °F)  Pulse:  [] 49  Resp:  [16-18] 16  BP: ()/() 164/95  SpO2:  [93 %-97 %] 95 %    Physical Exam   Constitutional: She is oriented to person, place, and time. She appears well-developed and well-nourished. No distress.   HENT:   Head: Normocephalic and atraumatic.   Eyes: Pupils are equal, round, and reactive to light. Conjunctivae and EOM are normal. Right eye exhibits no discharge. Left eye exhibits no discharge. No scleral icterus.   Neck: Normal range of motion.   Cardiovascular: Normal rate, regular rhythm and normal heart sounds.   No murmur heard.  Pulmonary/Chest: Effort normal and breath sounds normal. No respiratory distress. She has no wheezes. She has no rales.   Abdominal: Soft. Bowel sounds are normal. She exhibits no distension. There is no tenderness. There is no rebound.   Musculoskeletal: Normal range of motion. She exhibits no edema.   Neurological: She is alert and oriented to person, place, and time. She displays normal reflexes. No cranial nerve deficit. She exhibits normal muscle tone. Coordination normal.   Skin: Skin is warm and dry.   Psychiatric: She has a normal mood and affect. Her behavior is normal. Thought content normal.       Fluids    Intake/Output Summary (Last 24 hours) at 8/27/2019 1222  Last data filed at 8/26/2019 2225  Gross per 24 hour   Intake 970 ml   Output --   Net 970 ml       Laboratory  Recent Labs     08/25/19  1655 08/26/19  0355 08/27/19  0357   WBC 8.1 6.1 7.9   RBC 4.38 4.17* 4.34   HEMOGLOBIN 12.9 12.2 13.0   HEMATOCRIT 41.1 39.8 39.9   MCV 93.8  95.4 91.9   MCH 29.5 29.3 30.0   MCHC 31.4* 30.7* 32.6*   RDW 49.0 49.3 46.7   PLATELETCT 143* 140* 125*   MPV 9.9 10.0 10.0     Recent Labs     08/25/19  1655 08/26/19  0355 08/27/19  0357   SODIUM 143 142 143   POTASSIUM 3.9 3.8 3.6   CHLORIDE 111 111 111   CO2 21 23 24   GLUCOSE 102* 87 101*   BUN 22 21 22   CREATININE 1.58* 1.22 0.94   CALCIUM 10.0 9.6 9.7             Recent Labs     08/26/19  0355 08/27/19  0357   TRIGLYCERIDE 81 77   HDL 47 46   * 85       Imaging  EC-ECHOCARDIOGRAM COMPLETE W/O CONT   Final Result      MR-BRAIN-W/O   Final Result      1.  No evidence of acute territorial infarct, intracranial hemorrhage or mass lesion.   2.  Chronic left cerebellar lacunar infarct.   3.  Moderate diffuse cerebral substance loss.   4.  Mild microangiopathic ischemic change versus demyelination or gliosis.      CT-HEAD W/O   Final Result      No acute intracranial abnormality is identified.      Atrophy      There are periventricular and subcortical white matter changes present.  This finding is nonspecific and could be from previous small vessel ischemia, demyelination, or gliosis.           Assessment/Plan  Orthostatic hypotension  Assessment & Plan  significant orthostatic positive today (08/27)   Possible giving many BP meds yesterday?? Doxazosin ?? Hypovolemic/euvolemic   Start IV fluid at this time   If stopping doxazosin BP significantly high when she is lying   No safe discharge. Continue to monitor       Thrombocytopenia (HCC)- (present on admission)  Assessment & Plan  Chronic  Continue to follow    Essential hypertension  Assessment & Plan  Very resistant   Possible renal stenosis  Follows with Dr. Bloch  Many side effects from most of hypertensive treatment   Allergy to amlodipine and hydralazine   Confusion from spironolactone (eplerenone not available in the hospital)  Hr low for BB   Resume doxazosin/ valsartan at this time   Refusing spironolactone while she is here     WILLIAN (renal artery  stenosis) (HCC)- (present on admission)  Assessment & Plan  Per chart review. US 2016  Follows with Dr. Bloch for resistant high blood pressure        VTE prophylaxis: SCD

## 2019-08-27 NOTE — PROGRESS NOTES
BP remains 192/98 left arm, 207/107 right arm. Medicating with PRN hydralazine due to HR 50~. Updated Dr. Bueno, will update if medication ineffective.

## 2019-08-28 VITALS
HEIGHT: 59 IN | HEART RATE: 52 BPM | WEIGHT: 120.59 LBS | OXYGEN SATURATION: 95 % | SYSTOLIC BLOOD PRESSURE: 167 MMHG | BODY MASS INDEX: 24.31 KG/M2 | TEMPERATURE: 98 F | RESPIRATION RATE: 18 BRPM | DIASTOLIC BLOOD PRESSURE: 82 MMHG

## 2019-08-28 PROBLEM — I95.1 ORTHOSTATIC HYPOTENSION: Status: RESOLVED | Noted: 2019-08-27 | Resolved: 2019-08-28

## 2019-08-28 LAB
ALBUMIN SERPL BCP-MCNC: 3.3 G/DL (ref 3.2–4.9)
ALBUMIN/GLOB SERPL: 1.5 G/DL
ALP SERPL-CCNC: 59 U/L (ref 30–99)
ALT SERPL-CCNC: 8 U/L (ref 2–50)
ANION GAP SERPL CALC-SCNC: 9 MMOL/L (ref 0–11.9)
AST SERPL-CCNC: 15 U/L (ref 12–45)
BASOPHILS # BLD AUTO: 0.4 % (ref 0–1.8)
BASOPHILS # BLD: 0.03 K/UL (ref 0–0.12)
BILIRUB SERPL-MCNC: 0.6 MG/DL (ref 0.1–1.5)
BUN SERPL-MCNC: 19 MG/DL (ref 8–22)
CALCIUM SERPL-MCNC: 8.9 MG/DL (ref 8.5–10.5)
CHLORIDE SERPL-SCNC: 113 MMOL/L (ref 96–112)
CO2 SERPL-SCNC: 21 MMOL/L (ref 20–33)
CREAT SERPL-MCNC: 0.94 MG/DL (ref 0.5–1.4)
EOSINOPHIL # BLD AUTO: 0.3 K/UL (ref 0–0.51)
EOSINOPHIL NFR BLD: 4 % (ref 0–6.9)
ERYTHROCYTE [DISTWIDTH] IN BLOOD BY AUTOMATED COUNT: 49.2 FL (ref 35.9–50)
GLOBULIN SER CALC-MCNC: 2.2 G/DL (ref 1.9–3.5)
GLUCOSE SERPL-MCNC: 91 MG/DL (ref 65–99)
HCT VFR BLD AUTO: 41.3 % (ref 37–47)
HGB BLD-MCNC: 13.2 G/DL (ref 12–16)
IMM GRANULOCYTES # BLD AUTO: 0.03 K/UL (ref 0–0.11)
IMM GRANULOCYTES NFR BLD AUTO: 0.4 % (ref 0–0.9)
LYMPHOCYTES # BLD AUTO: 1.54 K/UL (ref 1–4.8)
LYMPHOCYTES NFR BLD: 20.4 % (ref 22–41)
MAGNESIUM SERPL-MCNC: 1.3 MG/DL (ref 1.5–2.5)
MCH RBC QN AUTO: 30.1 PG (ref 27–33)
MCHC RBC AUTO-ENTMCNC: 32 G/DL (ref 33.6–35)
MCV RBC AUTO: 94.1 FL (ref 81.4–97.8)
MONOCYTES # BLD AUTO: 0.67 K/UL (ref 0–0.85)
MONOCYTES NFR BLD AUTO: 8.9 % (ref 0–13.4)
NEUTROPHILS # BLD AUTO: 4.97 K/UL (ref 2–7.15)
NEUTROPHILS NFR BLD: 65.9 % (ref 44–72)
NRBC # BLD AUTO: 0 K/UL
NRBC BLD-RTO: 0 /100 WBC
PHOSPHATE SERPL-MCNC: 3.1 MG/DL (ref 2.5–4.5)
PLATELET # BLD AUTO: 113 K/UL (ref 164–446)
PMV BLD AUTO: 9.5 FL (ref 9–12.9)
POTASSIUM SERPL-SCNC: 3.7 MMOL/L (ref 3.6–5.5)
PROT SERPL-MCNC: 5.5 G/DL (ref 6–8.2)
RBC # BLD AUTO: 4.39 M/UL (ref 4.2–5.4)
SODIUM SERPL-SCNC: 143 MMOL/L (ref 135–145)
WBC # BLD AUTO: 7.5 K/UL (ref 4.8–10.8)

## 2019-08-28 PROCEDURE — 83735 ASSAY OF MAGNESIUM: CPT

## 2019-08-28 PROCEDURE — 700105 HCHG RX REV CODE 258: Performed by: INTERNAL MEDICINE

## 2019-08-28 PROCEDURE — A9270 NON-COVERED ITEM OR SERVICE: HCPCS | Performed by: HOSPITALIST

## 2019-08-28 PROCEDURE — 99238 HOSP IP/OBS DSCHRG MGMT 30/<: CPT | Performed by: INTERNAL MEDICINE

## 2019-08-28 PROCEDURE — 700102 HCHG RX REV CODE 250 W/ 637 OVERRIDE(OP): Performed by: INTERNAL MEDICINE

## 2019-08-28 PROCEDURE — 700102 HCHG RX REV CODE 250 W/ 637 OVERRIDE(OP): Performed by: FAMILY MEDICINE

## 2019-08-28 PROCEDURE — 80053 COMPREHEN METABOLIC PANEL: CPT

## 2019-08-28 PROCEDURE — 84100 ASSAY OF PHOSPHORUS: CPT

## 2019-08-28 PROCEDURE — 700102 HCHG RX REV CODE 250 W/ 637 OVERRIDE(OP): Performed by: HOSPITALIST

## 2019-08-28 PROCEDURE — 85025 COMPLETE CBC W/AUTO DIFF WBC: CPT

## 2019-08-28 PROCEDURE — 700111 HCHG RX REV CODE 636 W/ 250 OVERRIDE (IP): Performed by: INTERNAL MEDICINE

## 2019-08-28 PROCEDURE — 36415 COLL VENOUS BLD VENIPUNCTURE: CPT

## 2019-08-28 PROCEDURE — A9270 NON-COVERED ITEM OR SERVICE: HCPCS | Performed by: INTERNAL MEDICINE

## 2019-08-28 PROCEDURE — A9270 NON-COVERED ITEM OR SERVICE: HCPCS | Performed by: FAMILY MEDICINE

## 2019-08-28 RX ORDER — MAGNESIUM SULFATE HEPTAHYDRATE 40 MG/ML
2 INJECTION, SOLUTION INTRAVENOUS ONCE
Status: COMPLETED | OUTPATIENT
Start: 2019-08-28 | End: 2019-08-28

## 2019-08-28 RX ADMIN — ASPIRIN 81 MG: 81 TABLET, COATED ORAL at 06:00

## 2019-08-28 RX ADMIN — SODIUM CHLORIDE: 9 INJECTION, SOLUTION INTRAVENOUS at 05:05

## 2019-08-28 RX ADMIN — VALSARTAN 320 MG: 80 TABLET, FILM COATED ORAL at 05:04

## 2019-08-28 RX ADMIN — DOXAZOSIN 4 MG: 2 TABLET ORAL at 05:04

## 2019-08-28 RX ADMIN — MAGNESIUM SULFATE 2 G: 2 INJECTION INTRAVENOUS at 09:31

## 2019-08-28 RX ADMIN — SPIRONOLACTONE 50 MG: 25 TABLET ORAL at 05:05

## 2019-08-28 RX ADMIN — FAMOTIDINE 20 MG: 20 TABLET ORAL at 05:04

## 2019-08-28 NOTE — PROGRESS NOTES
Monitor summary:  Sinus amilcar 50,SR 62-87, Sinus Tachycardia 111, OR 0.20, QRS 0.08, QT 0.48, per strip from monitor room.

## 2019-08-28 NOTE — PROGRESS NOTES
Assume bedside report and care at 1900.  Pt is A&O x 4.  Patient call light within reach, bed locked and in lowest position.  Pain is generalized. Pt states she always has some pain at baseline.  Denies numbness and tingling. Medications given per MAR.  Ambulation denied at this time, SCDs/MOISÉS hose on.  Skin intact and blanching.  All questions answered at this time.

## 2019-08-28 NOTE — CARE PLAN
Problem: Bowel/Gastric:  Goal: Normal bowel function is maintained or improved  Outcome: PROGRESSING AS EXPECTED     8/27 + large BM     Problem: Safety:  Goal: Will remain free from injury  Outcome: PROGRESSING AS EXPECTED     Bed alarm in place, treaded socks applied

## 2019-08-28 NOTE — DISCHARGE PLANNING
Tessy is not requiring 2 of 3 disciplines.  [facility] will no longer follow.  Please re-consult for further review.

## 2019-08-28 NOTE — DISCHARGE SUMMARY
Discharge Summary    CHIEF COMPLAINT ON ADMISSION  Chief Complaint   Patient presents with   • ALOC   • Dizziness       Reason for Admission  Disoriented     Admission Date  8/25/2019    CODE STATUS  Full Code    HPI & HOSPITAL COURSE     80-year-old female past medical history of resistant hypertension, questionable renal artery stenosis, fibromyalgia was brought by family members due to altered mental status and speech changes.  On arrival in the ER her speech was back to her baseline and mental status was back to her baseline.  Head CT was negative for any acute intracranial abnormalities.  MRI negative.  Echocardiogram unremarkable.  She was started on stroke protocol including permissive hypertension, statin, aspirin.  Arrival blood pressure slightly low but it continues to remain extremely high during hospital stay.  fluids IV fluids were discontinued since BP was significantly elevated.  Her kidney function was improved.  Resumed her home medications for  hypertension and patient was kept additional night for close monitoring of resistant hypertension,  tele monitoring. Following day 08/27 while pt was prepared for discharge noted significant orthostatic hypotension after starting home blood pressure meds. IV fluid started again and she was kept another night  For close monitoring of vitals. Pt refused to take spironolactone and eplerenone was not available as inpatient. EKG junctional rhythm, but similar as before. IV fluid were restarted. Following day orthostatic were resolved. Pt was discharge with close follow up with primary care and Dr. Bloch. I did discuss with her my concern about doxazosin as potential reason of her orthostatic and discuss with Dr. Iqbal or Dr. Bloch to try again amlodipine. I will defer that decision to them. At the end likely her symptoms were might be related to her labile hypertension and not true TIA. Since she is high risk for stroke, still recommended to continue statin.      Therefore, she is discharged in guarded and stable condition to home with close outpatient follow-up.    The patient met 2-midnight criteria for an inpatient stay at the time of discharge.    Discharge Date  08/28/2019     FOLLOW UP ITEMS POST DISCHARGE  None     DISCHARGE DIAGNOSES  Principal Problem (Resolved):    TIA (transient ischemic attack) POA: Yes  Active Problems:    WILLIAN (renal artery stenosis) (HCC) POA: Yes    Essential hypertension POA: Unknown    Thrombocytopenia (HCC) POA: Yes  Resolved Problems:    RUDY (acute kidney injury) ([facility]) POA: Yes    Orthostatic hypotension POA: Unknown      FOLLOW UP  No future appointments.  Jaswinder Iqbal M.D.  [address] [place] [id number]  [phone]    Go on 9/4/2019  Please arrive at 8:20AM for your follow up appointment at 8:40Am. Thank you.      MEDICATIONS ON DISCHARGE     Medication List      START taking these medications      Instructions   atorvastatin 40 MG Tabs  Commonly known as:  LIPITOR   Take 1 Tab by mouth every evening.  Dose:  40 mg        CONTINUE taking these medications      Instructions   aspirin 81 MG Chew chewable tablet  Commonly known as:  ASA   Take 81 mg by mouth every day.  Dose:  81 mg     doxazosin 2 MG Tabs  Commonly known as:  CARDURA   Take 4 mg by mouth every morning.  Dose:  4 mg     Eplerenone 50 MG Tabs   Take 50 mg by mouth every day.  Dose:  50 mg     pantoprazole 20 MG tablet  Commonly known as:  PROTONIX   TAKE 1 TABLET BY MOUTH TWICE A DAY     raNITidine 150 MG Tabs  Commonly known as:  ZANTAC   TAKE 1 TABLET BY MOUTH EVERY DAY     valsartan 320 MG tablet  Commonly known as:  DIOVAN   Take 320 mg by mouth every morning.  Dose:  320 mg            Allergies  Allergies   Allergen Reactions   • Acyclovir    • Ciprofloxacin    • Citalopram    • Hydralazine Hcl    • Lyrica    • Neurontin [Gabapentin]    • Norvasc [Amlodipine]    • Morphine    • Tramadol      Drowsy, dizzy, depression   • Woodruff Itching      Itchy throat       DIET  Orders Placed This Encounter   Procedures   • Diet Order Renal (walnut allergy)     Standing Status:   Standing     Number of Occurrences:   1     Order Specific Question:   Diet:     Answer:   Renal [8]     Comments:   walnut allergy   • Discontinue Diet Tray     Standing Status:   Standing     Number of Occurrences:   1       ACTIVITY  As tolerated.  Weight bearing as tolerated    CONSULTATIONS  None     PROCEDURES  None     LABORATORY  Lab Results   Component Value Date    SODIUM 143 08/28/2019    POTASSIUM 3.7 08/28/2019    CHLORIDE 113 (H) 08/28/2019    CO2 21 08/28/2019    GLUCOSE 91 08/28/2019    BUN 19 08/28/2019    CREATININE 0.94 08/28/2019    CREATININE 1.0 03/11/2009        Lab Results   Component Value Date    WBC 7.5 08/28/2019    HEMOGLOBIN 13.2 08/28/2019    HEMATOCRIT 41.3 08/28/2019    PLATELETCT 113 (L) 08/28/2019        Total time of the discharge process exceeds 30 minutes.

## 2019-08-28 NOTE — DISCHARGE INSTRUCTIONS
"Discharge Instructions    Discharged to home by car with relative. Discharged via wheelchair, hospital escort: Yes.  Special equipment needed: Not Applicable    Be sure to schedule a follow-up appointment with your primary care doctor or any specialists as instructed.     Discharge Plan:        I understand that a diet low in cholesterol, fat, and sodium is recommended for good health. Unless I have been given specific instructions below for another diet, I accept this instruction as my diet prescription.   Other diet: renal diet, walnut allergy noted    Special Instructions:     Stroke/CVA/TIA/Hemorrhagic Ischemia Discharge Instructions  You have had a stroke. Your risk factors have been identified as follows:  Age - Over 55  High blood pressure  Previous TIAs or \"mini strokes\"  It is important that you reduce your risk factors to avoid another stroke in the future. Here are some general guidelines to follow:  · Eat healthy - avoid food high in fat.  · Get regular exercise.  · Maintain a healthy weight.  · Avoid smoking.  · Avoid alcohol and illegal drug use.  · Take your medications as directed.  For more information regarding risk factors, refer to pages 17-19 in your Stroke Patient Education Guide. Stroke Education Guide was given to patient.    Warning signs of a stroke include (which can also be found on page 3 of your Stroke Patient Education Guide):  · Sudden numbness of weakness of the face, arm or leg (especially on one side of the body).  · Sudden confusion, trouble speaking or understanding.  · Sudden trouble seeing in one or both eyes.  · Sudden trouble walking, dizziness, loss of balance or coordination.  · Sudden severe headache with no known cause.  It is very important to get treatment quickly when a stroke occurs. If you experience any of the above warning signs, call 911 immediately.     Some patients who have had a stroke will be going home on a blood thinner medication called Warfarin " (Coumadin).  This medication requires very close monitoring and follow up.  This follow up can be provided by either your Primary Care Physician or by [facility] Outpatient Anticoagulation Service.  The Outpatient Anticoagulation Service is located at the [facility] ([facility]).  If you do not know when your follow up appointment is scheduled, call [phone] to verify your appointment time.      · Is patient discharged on Warfarin / Coumadin?   No     Depression / Suicide Risk    As you are discharged from this [facility], it is important to learn how to keep safe from harming yourself.    Recognize the warning signs:  · Abrupt changes in personality, positive or negative- including increase in energy   · Giving away possessions  · Change in eating patterns- significant weight changes-  positive or negative  · Change in sleeping patterns- unable to sleep or sleeping all the time   · Unwillingness or inability to communicate  · Depression  · Unusual sadness, discouragement and loneliness  · Talk of wanting to die  · Neglect of personal appearance   · Rebelliousness- reckless behavior  · Withdrawal from people/activities they love  · Confusion- inability to concentrate     If you or a loved one observes any of these behaviors or has concerns about self-harm, here's what you can do:  · Talk about it- your feelings and reasons for harming yourself  · Remove any means that you might use to hurt yourself (examples: pills, rope, extension cords, firearm)  · Get professional help from the community (Mental Health, Substance Abuse, psychological counseling)  · Do not be alone:Call your Safe Contact- someone whom you trust who will be there for you.  · Call your local CRISIS HOTLINE 592-6329 or 657-186-2352  · Call your local Children's Mobile Crisis Response Team [place] (058) 696-9737 or www.[employer]  · Call the toll free  National Suicide Prevention Hotlines   · National Suicide Prevention Lifeline 856-095-VXWE (7115)  · National Hope Line Network 800-SUICIDE (601-4026)      Discharge Instructions per Danielle Lancaster M.D.    Continue same blood pressure medications and follow up with primary care and Dr. Bloch   Start to take atorvastatin 40 mg daily     DIET: healthy     ACTIVITY: as tolerated     DIAGNOSIS: hypertension emergency/urgency   transient ischemic attack     Return to ER if worsen mentation, chest pain, slurred speech, shortness of breath, cough, abdominal pain, vision changes, falling, fever

## 2019-08-31 ENCOUNTER — HOSPITAL ENCOUNTER (EMERGENCY)
Facility: MEDICAL CENTER | Age: 84
End: 2019-08-31
Attending: EMERGENCY MEDICINE
Payer: MEDICARE

## 2019-08-31 VITALS
OXYGEN SATURATION: 95 % | RESPIRATION RATE: 16 BRPM | BODY MASS INDEX: 23.78 KG/M2 | DIASTOLIC BLOOD PRESSURE: 79 MMHG | HEART RATE: 57 BPM | SYSTOLIC BLOOD PRESSURE: 178 MMHG | WEIGHT: 117.95 LBS | HEIGHT: 59 IN | TEMPERATURE: 98.4 F

## 2019-08-31 DIAGNOSIS — R42 LIGHTHEADEDNESS: ICD-10-CM

## 2019-08-31 DIAGNOSIS — R42 DIZZINESS: ICD-10-CM

## 2019-08-31 LAB
ALBUMIN SERPL BCP-MCNC: 3.7 G/DL (ref 3.2–4.9)
ALBUMIN/GLOB SERPL: 1.4 G/DL
ALP SERPL-CCNC: 66 U/L (ref 30–99)
ALT SERPL-CCNC: 11 U/L (ref 2–50)
ANION GAP SERPL CALC-SCNC: 7 MMOL/L (ref 0–11.9)
AST SERPL-CCNC: 17 U/L (ref 12–45)
BASOPHILS # BLD AUTO: 0.2 % (ref 0–1.8)
BASOPHILS # BLD: 0.01 K/UL (ref 0–0.12)
BILIRUB SERPL-MCNC: 0.7 MG/DL (ref 0.1–1.5)
BUN SERPL-MCNC: 15 MG/DL (ref 8–22)
CALCIUM SERPL-MCNC: 9.5 MG/DL (ref 8.5–10.5)
CHLORIDE SERPL-SCNC: 107 MMOL/L (ref 96–112)
CO2 SERPL-SCNC: 24 MMOL/L (ref 20–33)
CREAT SERPL-MCNC: 0.85 MG/DL (ref 0.5–1.4)
EKG IMPRESSION: NORMAL
EOSINOPHIL # BLD AUTO: 0.2 K/UL (ref 0–0.51)
EOSINOPHIL NFR BLD: 3.3 % (ref 0–6.9)
ERYTHROCYTE [DISTWIDTH] IN BLOOD BY AUTOMATED COUNT: 47.1 FL (ref 35.9–50)
GLOBULIN SER CALC-MCNC: 2.7 G/DL (ref 1.9–3.5)
GLUCOSE SERPL-MCNC: 99 MG/DL (ref 65–99)
HCT VFR BLD AUTO: 38.8 % (ref 37–47)
HGB BLD-MCNC: 12.3 G/DL (ref 12–16)
IMM GRANULOCYTES # BLD AUTO: 0.06 K/UL (ref 0–0.11)
IMM GRANULOCYTES NFR BLD AUTO: 1 % (ref 0–0.9)
LIPASE SERPL-CCNC: 24 U/L (ref 11–82)
LYMPHOCYTES # BLD AUTO: 0.95 K/UL (ref 1–4.8)
LYMPHOCYTES NFR BLD: 15.7 % (ref 22–41)
MAGNESIUM SERPL-MCNC: 1.6 MG/DL (ref 1.5–2.5)
MCH RBC QN AUTO: 29.5 PG (ref 27–33)
MCHC RBC AUTO-ENTMCNC: 31.7 G/DL (ref 33.6–35)
MCV RBC AUTO: 93 FL (ref 81.4–97.8)
MONOCYTES # BLD AUTO: 0.64 K/UL (ref 0–0.85)
MONOCYTES NFR BLD AUTO: 10.6 % (ref 0–13.4)
NEUTROPHILS # BLD AUTO: 4.18 K/UL (ref 2–7.15)
NEUTROPHILS NFR BLD: 69.2 % (ref 44–72)
NRBC # BLD AUTO: 0 K/UL
NRBC BLD-RTO: 0 /100 WBC
PLATELET # BLD AUTO: 126 K/UL (ref 164–446)
PMV BLD AUTO: 9.9 FL (ref 9–12.9)
POTASSIUM SERPL-SCNC: 4 MMOL/L (ref 3.6–5.5)
PROT SERPL-MCNC: 6.4 G/DL (ref 6–8.2)
RBC # BLD AUTO: 4.17 M/UL (ref 4.2–5.4)
SODIUM SERPL-SCNC: 138 MMOL/L (ref 135–145)
TROPONIN T SERPL-MCNC: 15 NG/L (ref 6–19)
WBC # BLD AUTO: 6 K/UL (ref 4.8–10.8)

## 2019-08-31 PROCEDURE — 93005 ELECTROCARDIOGRAM TRACING: CPT | Performed by: EMERGENCY MEDICINE

## 2019-08-31 PROCEDURE — 80053 COMPREHEN METABOLIC PANEL: CPT

## 2019-08-31 PROCEDURE — 84484 ASSAY OF TROPONIN QUANT: CPT

## 2019-08-31 PROCEDURE — 85025 COMPLETE CBC W/AUTO DIFF WBC: CPT

## 2019-08-31 PROCEDURE — 700102 HCHG RX REV CODE 250 W/ 637 OVERRIDE(OP): Performed by: EMERGENCY MEDICINE

## 2019-08-31 PROCEDURE — A9270 NON-COVERED ITEM OR SERVICE: HCPCS | Performed by: EMERGENCY MEDICINE

## 2019-08-31 PROCEDURE — 99284 EMERGENCY DEPT VISIT MOD MDM: CPT

## 2019-08-31 PROCEDURE — 93005 ELECTROCARDIOGRAM TRACING: CPT

## 2019-08-31 PROCEDURE — 83690 ASSAY OF LIPASE: CPT

## 2019-08-31 PROCEDURE — 83735 ASSAY OF MAGNESIUM: CPT

## 2019-08-31 RX ORDER — CLONIDINE HYDROCHLORIDE 0.1 MG/1
0.1 TABLET ORAL ONCE
Status: COMPLETED | OUTPATIENT
Start: 2019-08-31 | End: 2019-08-31

## 2019-08-31 RX ADMIN — CLONIDINE HYDROCHLORIDE 0.1 MG: 0.1 TABLET ORAL at 09:32

## 2019-08-31 NOTE — ED TRIAGE NOTES
Destiny Burns  88 y.o.  Chief Complaint   Patient presents with   • Dizziness     onset 0730, had full workup for stroke, was admitted for 3 days earlier this week, symptoms have improved at this time, also feels weak, some numbness to bilat face, all similar to symptoms earlier this week     Patient wheeled in wc with family member to triage room with above complaint.  Patient appears in mild discomfort.  No obvious neuro deficits other than subjective symptoms.  FAST stroke scale negative.  Patient states symptoms started while she was drinking a glass of water taking her morning meds.  States she took her BP meds this am.    Charge notified about BP.  Patient escorted to red 05.

## 2019-08-31 NOTE — ED PROVIDER NOTES
ED Provider Note    ED Provider Note    Primary care provider: Jaswinder Iqbal M.D.  Means of arrival: POV  History obtained from: Patient    CHIEF COMPLAINT  Chief Complaint   Patient presents with   • Dizziness     onset 0730, had full workup for stroke, was admitted for 3 days earlier this week, symptoms have improved at this time, also feels weak, some numbness to bilat face, all similar to symptoms earlier this week     Seen at 9:03 AM.   HPI  Destiny Burns is a 88 y.o. female who presents to the Emergency Department with symptoms similar to her presentation several days ago.  She states that she was up standing for about an hour, making breakfast and cleaning dishes etc., she then developed gradual lightheadedness described as dizziness and feeling that she might fall if she continued to stand.  She felt wobbly and her legs felt weaker than usual.  She did not report any room spinning or sensation of loss of balance.  She feels that her cheeks may be slightly decrease in sensation bilaterally.  Other than that she notes no other new complaints.  She reports occasionally getting a razor blade stabbing sensation in her upper abdomen that is somewhat positional in nature.  She does not have this pain now but it occurred earlier today and it occurs every so often for the past several months.  She currently takes valsartan and doxazosin.  The patient denies any prior history of urinary retention.  She had a reaction to amlodipine and does not wish to take this medication.  When she was discharged she was instructed to take all her medications in the morning, normally she takes them in the evening.    Currently she is without complaints though is concerned about her elevated blood pressure.  She denies any headache, visual changes, chest pain, shortness of breath, nausea, vomiting, back pain, numbness, focal weakness.    REVIEW OF SYSTEMS  See HPI,   Remainder of ROS negative.     PAST MEDICAL HISTORY   has  "a past medical history of Chronic venous insufficiency (5/26/2016), Hypertension (5/26/2016), LLQ abdominal mass (5/26/2016), Palpitations (5/26/2016), and WILLIAN (renal artery stenosis) (HCC) (5/26/2016).    SURGICAL HISTORY  patient denies any surgical history    SOCIAL HISTORY  Social History     Tobacco Use   • Smoking status: Never Smoker   • Smokeless tobacco: Never Used   Substance Use Topics   • Alcohol use: No     Alcohol/week: 0.0 oz   • Drug use: No      Social History     Substance and Sexual Activity   Drug Use No       FAMILY HISTORY  Family History   Problem Relation Age of Onset   • Cancer Mother         ORAL   • Cancer Other         AUNT   • Cancer Other         SKIN   • Diabetes Father        CURRENT MEDICATIONS  Reviewed.  See Encounter Summary.     ALLERGIES  Allergies   Allergen Reactions   • Acyclovir    • Ciprofloxacin    • Citalopram    • Hydralazine Hcl    • Lyrica    • Neurontin [Gabapentin]    • Norvasc [Amlodipine]    • Morphine    • Tramadol      Drowsy, dizzy, depression   • Rifle Itching     Itchy throat       PHYSICAL EXAM  VITAL SIGNS: BP (!) 215/86   Pulse (!) 51   Temp 36.9 °C (98.4 °F) (Temporal)   Resp 16   Ht 1.499 m (4' 11\")   Wt 53.5 kg (117 lb 15.1 oz)   SpO2 96%   BMI 23.82 kg/m²   Constitutional: Awake, alert in no apparent distress.  HENT: Normocephalic, Bilateral external ears normal. Nose normal.   Eyes: Conjunctiva normal, non-icteric, EOMI.    Thorax & Lungs: Easy unlabored respirations, Clear to ascultation bilaterally.  Cardiovascular: Regular rate, Regular rhythm, No murmurs, rubs or gallops.  Abdomen:  Soft, nontender, nondistended, normal active bowel sounds.   :    Skin: Visualized skin is  Dry, No erythema, No rash.   Musculoskeletal:   No cyanosis, clubbing or edema.  Neurologic: Alert, Grossly non-focal.   Psychiatric: Normal affect, Normal mood  Lymphatic:  No cervical LAD    EKG   12 lead Interpreted by me  Rhythm:  Normal sinus rhythm   Rate: " 61  Axis: normal  Ectopy: none  Conduction: normal  ST Segments: no acute change  T Waves: no acute change  Clinical Impression: Normal EKG without acute changes     RADIOLOGY  No orders to display         COURSE & MEDICAL DECISION MAKING  Pertinent Labs & Imaging studies reviewed. (See chart for details)    Differential diagnoses include but are not limited to: Hypertensive urgency, pseudohypertension, medication side effect, RUDY    9:03 AM - Medical record reviewed, patient was discharged from this facility 72 hours ago, MRI at that time was negative.  She did have some labile blood pressures and there was some adjustment of blood pressure medications.    10:39 AM-systolic blood pressure 175.  The patient continues to have some mild lightheadedness but is otherwise without complaints.    10:57 AM-patient was able to ambulate without assistance.  She is stable for discharge.    Decision Making:  This is a 88 y.o. year old female who presents with lightheadedness/dizziness and elevated blood pressure.  The patient was admitted within the past week.  She had an MRI that was negative for an acute stroke.  2D echo was unremarkable as well.  The patient is now on a statin as well as aspirin.  She is optimally managed with the exception of her blood pressure which continues to be a challenge to treat.  She did not have any imaging of the carotids but she declines any surgical intervention anyway so I see no emergent need to image the carotids today.    She is neurologically intact at this time, I do not suspect a cerebellar CVA.  Her blood pressure may be pseudohypertension from some hardened arteries-which would explain why she gets lightheaded and she had hypotension in the inpatient service when blood pressure medications were increased.  I recommend stopping the Cardura as the patient does not have any prior history of urinary retention and this is known to cause orthostatic hypotension.  Also, if she still wishes to  take the Cardura she can take in the evening as it may lessen the effects of the orthostasis.    I see no benefit to admitting the patient, there is no additional work-up that can be done now, it seems unlikely to be a TIA/CVA, she is not a TPA candidate given her very mild symptoms.  She is more at risk for nosocomial infections and medication errors than any benefit from admission.    She has follow-up with her primary care physician in 3 days which I think is appropriate.  I recommend she bring her pills to the appointment and discuss medication changes.    We discussed different options, I do not feel that a beta-blocker would be beneficial as it will cause worsening orthostasis.  She refuses a calcium channel blocker.  She is maximum managed on ARB.  She is not an ideal candidate for diuretics.        Discharge Medications:  New Prescriptions    No medications on file       The patient was discharged home (see d/c instructions) and parent was told to return immediately for any signs or symptoms listed, or any worsening at all.  The patient's parent verbally agreed to the discharge precautions and follow-up plan which is documented in EPIC.        FINAL IMPRESSION  1. Dizziness    2. Lightheadedness

## 2019-10-31 ENCOUNTER — HOSPITAL ENCOUNTER (OUTPATIENT)
Dept: LAB | Facility: MEDICAL CENTER | Age: 84
End: 2019-10-31
Attending: INTERNAL MEDICINE
Payer: MEDICARE

## 2019-10-31 LAB
ANION GAP SERPL CALC-SCNC: 9 MMOL/L (ref 0–11.9)
BUN SERPL-MCNC: 26 MG/DL (ref 8–22)
CALCIUM SERPL-MCNC: 9.4 MG/DL (ref 8.5–10.5)
CHLORIDE SERPL-SCNC: 110 MMOL/L (ref 96–112)
CO2 SERPL-SCNC: 25 MMOL/L (ref 20–33)
CREAT SERPL-MCNC: 0.82 MG/DL (ref 0.5–1.4)
GLUCOSE SERPL-MCNC: 79 MG/DL (ref 65–99)
POTASSIUM SERPL-SCNC: 3.9 MMOL/L (ref 3.6–5.5)
SODIUM SERPL-SCNC: 144 MMOL/L (ref 135–145)

## 2019-10-31 PROCEDURE — 80048 BASIC METABOLIC PNL TOTAL CA: CPT

## 2019-10-31 PROCEDURE — 36415 COLL VENOUS BLD VENIPUNCTURE: CPT

## 2020-02-18 NOTE — PROGRESS NOTES
Established Patient    Ms. Lund a 88 y.o. female who presents today with:  CC: Follow-Up (fecal smearing, hypertension); Medication Problem (spirinolactone, has been having trouble with this medication); and Medication Management (is colace something she should start)        Assessment and Plan    1.  Essential hypertension-controlled.  Patient currently is having side effects from spironolactone.  The side effect is mild confusion which is a noted adverse event of this medication.  She will contact Dr. Bloch's office to discuss side effects.    2.  Chronic constipation-patient was recommended to use MiraLAX at a higher dose per her gastroenterologist.  I reviewed his most recent progress note.  Patient has been reluctant to do that.  We discussed the use of MiraLAX for chronic constipation.  She agrees to increase her dose.    Current Outpatient Prescriptions   Medication Sig Dispense Refill   • raNITidine (ZANTAC) 150 MG Tab TAKE 1 TABLET BY MOUTH EVERY DAY 90 Tab 2   • pantoprazole (PROTONIX) 20 MG tablet Take 1 Tab by mouth 2 Times a Day. 180 Tab 0   • Polyethylene Glycol 3350 (MIRALAX PO) Take  by mouth.     • acetaminophen (TYLENOL) 325 MG Tab Take 650 mg by mouth every four hours as needed.     • simethicone (GAS-X) 80 MG Chew Tab Take 80 mg by mouth every 6 hours as needed for Flatulence.     • valsartan (DIOVAN) 320 MG tablet Take 320 mg by mouth every day.     • doxazosin (CARDURA) 2 MG Tab Take 4 mg by mouth every day.     • vitamin D (CHOLECALCIFEROL) 1000 UNIT Tab Take 1,000 Units by mouth every day.     • Multiple Vitamins-Minerals (PRESERVISION AREDS 2) Cap Take 1 Tab by mouth 2 Times a Day.     • aspirin (ASA) 81 MG CHEW chewable tablet Take 81 mg by mouth every day.     • docusate sodium (COLACE) 100 MG Cap Take 100 mg by mouth 2 times a day.       No current facility-administered medications for this visit.          followup No Follow-up on file.    This note was created using voice recognition  Speech-Language Pathology Bedside Swallow Evaluation        Patient Name: Sandro Roau    ICXJU'F Date: 2/18/2020     Problem List  Patient Active Problem List   Diagnosis    [facility]VD [facility]peripheral vascular [facility] )    History of cancer tonsil    Essential hypertension    Dependence on nicotine from cigarettes    Renal stone    [facility]    Coagulopathy ([facility]  )    Ambulatory dysfunction    Supratherapeutic INR    Cancer ([place] [address] )    Hx of BKA, right ([place] [address] )    Esophageal cancer ([facility]  )    Left carotid stenosis    Anticoagulant long-term use    Acute blood loss anemia    Lung nodule    ASCVD (arteriosclerotic cardiovascular disease)    Anemia    Melena       Past Medical History  Past Medical History:   Diagnosis Date    Cancer ([place] [address] )     throat cancer    GERD (gastroesophageal reflux disease)     Hyperlipidemia     Hypertension     Left carotid stenosis 1/14/2020    PAD (peripheral artery disease) ([facility]  ) 11/2/2019    PEG (percutaneous endoscopic gastrostomy) status ([place] [address] )     Port-A-Cath in place     Thrombocytopenia ([place] [address] ) 11/18/2019       Past Surgical History  Past Surgical History:   Procedure Laterality Date    BYPASS FEMORAL-FEMORAL Right     ESOPHAGOSCOPY N/A 8/24/2017    Procedure: ESOPHAGOSCOPY FLEXIBLE;  Surgeon: Radha Lambert MD;  [facility];  Service: ENT    IR ABDOMINAL ANGIOGRAPHY / INTERVENTION  11/14/2019    IR ABDOMINAL ANGIOGRAPHY / INTERVENTION  11/7/2019    IR ABDOMINAL ANGIOGRAPHY / INTERVENTION  12/13/2019    LEG AMPUTATION THROUGH LOWER TIBIA AND FIBULA Right 12/19/2019    Procedure: AMPUTATION BELOW KNEE (BKA);   Surgeon: Fredrick Pastrana MD;  [facility];  Service: Vascular    GA BRONCHOSCOPY,DIAGNOSTIC N/A 8/24/2017    Procedure: Bronchoscopy;  Surgeon: Radha Lambert MD;  [facility];  Service: ENT    GA LARYNGOSCOPY,DIRCT,OP,BIOPSY N/A 8/24/2017    Procedure: LARYNGOSCOPY DIRECT;  Surgeon: Oneida Howell MD;  [facility];  Service: ENT   [address] Left 11/7/2019    Procedure: LEFT ENDARTERECTOMY ARTERIAL FEMORAL; L CFAE WITH PROFUNDOPLASTY; ARTERIOGRAM;RETROGRADE ILIAC STENTING;  Surgeon: Pearl Guerrero MD;  Location:  MAIN OR;  Service: Vascular    TOE AMPUTATION Right     2 toes         Current Medical Status  Pt is a 79 y o  male who presented to [facility] with  an elevated INR  He has a known history of malignancy, prior thrombotic events, and extensive peripheral vascular disease  ST was consulted given his hx of esophageal ca with decreased intake  The assessment was postponed as he was made NPO due to presence of dark stools pending GI consult and possible EGD  This was not completed as it is deemed unnecessary  He was placed on a dysphagia 3 diet with thin liquids  Patient is known to this department from previous admission and seen on 11/16  He was seen with moist soft foods and placed on a dysph 3 diet with thin liquids at that time  Further review of chart patient with known h/o 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 as wella s lateral recess of soft palate  This was dx'ed in [facility] in 2017  Underwent concurrent Chemo and XRT and is now on surveillance  Patient denies any dysphagia but admits to edentulous status for many years  He reports he eats very finely cut/soft/moist food at home  Upon my arrival he did have robe grahams which he reports he sucks on but is unable to masticate  He denies any recent PNA  He reports he is unsure if he has had weight loss as he stated he does not weigh himself regularly  He was seen by RD and is likely not meeting nutritional needs  Past medical history:   Please see H&P for details    Special Studies:  2/14 CT head: No acute intracranial abnormality  Generalized parenchymal volume loss and cerebral chronic microangiopathic disease   Worsening maxillary sinus opacification, correlate software (Dragon). The accuracy of the dictation is limited by the abilities of the software. I have reviewed the note prior to signing, however some errors in grammar and context are still possible. If you have any questions related to this note please do not hesitate to contact our office.   _______________________________________________________    HPI:   List:  She has resumed spironolactone. Dr. Bloch resumed it recently several months ago. Due to her SBP in the 190s.She thinks it is causing sadness and crying. Mild confusion with it (losing track during conversation or going in through exit doors). She's had this before when she was on spironolactone. She is sure she is not depressed. No anhedonia. No suicidal  Ideations. She doesn't think she can tolerate the side effects. Has a follow up appointment with Dr. Bloch  June 5. She's talked to Marilu at his office about refills but has not discussed side effects. Dr. Bloch prescribed a wrist monitor blood pressure monitor and leg stockings for edema but she cannot afford them. She is worried that she will get discharged from his practice because she cannot afford it.   She was prescribed colace in the past. Her discharge medication list includes it. But she forgot to take it. She takes Miralax in the morning but can have BM at bedtime that she doesn't like. Or she may have to have a BM out and about or has to rush home to have a BM.  Occasionally she can have pain in the right lower abdomen. Spontaneous. Lasts for a few minutes. Happens every two or three days. She thinks it is connected to Miralax use. When she has a BM the sensation is relieved 100% of the time. No nausea or melena. She has to strain because her stool is loose. She has go back and forth to the toilet to finish. YANETH from Dr. Marina in 3/11/2019 with severe constipation. Recommended she continue to with Mirlax. She is experimented with Miralax 1/2 dose per day. She has not increased the Mirlax  "as he instructed because she afraid to have a BM outside of the home. Doesn't want to ruin her social life.      has a past medical history of Chronic venous insufficiency (5/26/2016); Hypertension (5/26/2016); LLQ abdominal mass (5/26/2016); Palpitations (5/26/2016); and WILLIAN (renal artery stenosis) (HCC) (5/26/2016).     reports that she has never smoked. She has never used smokeless tobacco. She reports that she does not drink alcohol or use drugs.      ROS: Pertinent positives as stated in HPI, all others reviewed as negative:  Denies melena,m change in appetite, weight loss      Physical Exam  /100 (BP Location: Left arm, Patient Position: Sitting, BP Cuff Size: Adult)   Temp 36.6 °C (97.9 °F) (Temporal)   Ht 1.473 m (4' 10\")   Wt 53.4 kg (117 lb 12.8 oz)   BMI 24.62 kg/m²   Constitutional:  oriented to person, place, and time. No distress.   Other: Soft nontender no masses no guarding rebound        Current Outpatient Prescriptions on File Prior to Visit   Medication Sig Dispense Refill   • raNITidine (ZANTAC) 150 MG Tab TAKE 1 TABLET BY MOUTH EVERY DAY 90 Tab 2   • pantoprazole (PROTONIX) 20 MG tablet Take 1 Tab by mouth 2 Times a Day. 180 Tab 0   • Polyethylene Glycol 3350 (MIRALAX PO) Take  by mouth.     • acetaminophen (TYLENOL) 325 MG Tab Take 650 mg by mouth every four hours as needed.     • simethicone (GAS-X) 80 MG Chew Tab Take 80 mg by mouth every 6 hours as needed for Flatulence.     • valsartan (DIOVAN) 320 MG tablet Take 320 mg by mouth every day.     • doxazosin (CARDURA) 2 MG Tab Take 4 mg by mouth every day.     • vitamin D (CHOLECALCIFEROL) 1000 UNIT Tab Take 1,000 Units by mouth every day.     • Multiple Vitamins-Minerals (PRESERVISION AREDS 2) Cap Take 1 Tab by mouth 2 Times a Day.     • aspirin (ASA) 81 MG CHEW chewable tablet Take 81 mg by mouth every day.     • docusate sodium (COLACE) 100 MG Cap Take 100 mg by mouth 2 times a day.       No current facility-administered " for acute on chronic sinusitis  Social/Education/Vocational Hx:  Pt lives with family    Swallow Information   Current Risks for Dysphagia & Aspiration: known history of dysphagia     Current Symptoms/Concerns: poor intake    Current Diet: soft/level 3 diet and thin liquids      Baseline Diet: per patient finely cut/moist/soft with thin liquids      Baseline Assessment   Behavior/Cognition: alert    Speech/Language Status: able to participate in conversation and able to follow commands- patient is [place]une[place] speaking- a CNA was present who assisted in translation    Patient Positioning: upright in bed    Swallow Mechanism Exam   Facial: symmetrical  Labial: WFL  Lingual: bilateral decreased strength (mild)  Velum: unable to visualize  Mandible:  decreased ROM  Dentition: edentulous  Vocal quality:clear/adequate   Volitional Cough: strong/productive   Resp: RA  Swallow Mechanics: WFL upon dry swallow      Consistencies Assessed and Performance   Consistencies Administered: thin liquids, puree, mechanical soft solids and soft solids  Specific materials administered included: applesauce, moistened cracker, chopped chicken, thin liquids    Oral Stage:  Mastication was prolonged and incomplete with advanced solids  Patient reported that the chopped meat was too hard for him to chew  With soft solids he reported improved ease of mastication and indicated his family cuts it smaller/softer at home  Mild stasis with advanced solids which he utilized puree to moisten and transfer  Bolus formation and transfer were otherwise functional appearing with no significant oral residue noted  No overt s/s reduced oral control  Pharyngeal Stage:   Swallowing initiation appeared prompt  Laryngeal rise was palpated and judged to be within functional limits  No coughing, throat clearing, change in vocal quality or respiratory status noted today       Esophageal Concerns: none reported    Summary   Pts oropharyngeal swallow function appears generally unchanged from when we saw him in Nov of last year  Given his lack of dentition and reported diet at home I suspect he would tolerate/accept more of a dysphagia 2 diet with thin liquids- patient agreeable   Aspiration risk appears to be minimal      Recommendations: mechanically altered/level 2 diet and thin liquids     Recommended Form of Meds: no restrictions     Aspiration precautions and compensatory swallowing strategies: upright posture, only feed when fully alert, slow rate of feeding, small bites/sips and alternating bites and sips    Results Reviewed with: patient, RN and CRNP     Dysphagia Goals: pt will tolerate dysph 2 textures  with thin liquids without s/s of aspiration x2    Plan  Patient is pending possible d/c for today however if he remains in the hospital we will plan to follow up x1 to ensure diet tolerance/satisfaction    WING Jensen S , [id number] [facility]  Speech Language Pathologist   Available via [address] [place] [address] #[phone]  [place] #[id number] medications on file prior to visit.            Signed by: Jaswinder Iqbal M.D.

## 2020-04-07 DIAGNOSIS — I10 ESSENTIAL HYPERTENSION: ICD-10-CM

## 2020-04-07 RX ORDER — VALSARTAN 320 MG/1
320 TABLET ORAL EVERY MORNING
Qty: 90 TAB | Refills: 3 | Status: SHIPPED | OUTPATIENT
Start: 2020-04-07 | End: 2022-02-21 | Stop reason: SDUPTHER

## 2020-04-08 DIAGNOSIS — I10 ESSENTIAL HYPERTENSION: ICD-10-CM

## 2020-04-08 RX ORDER — VALSARTAN 320 MG/1
320 TABLET ORAL EVERY MORNING
Qty: 90 TAB | Refills: 3 | OUTPATIENT
Start: 2020-04-08

## 2021-01-14 DIAGNOSIS — Z23 NEED FOR VACCINATION: ICD-10-CM

## 2021-07-03 PROBLEM — M81.0 KYPHOSIS DUE TO OSTEOPOROSIS: Status: ACTIVE | Noted: 2021-07-03

## 2021-07-03 PROBLEM — I63.81 LACUNAR INFARCTION (HCC): Status: ACTIVE | Noted: 2021-07-03

## 2021-07-03 PROBLEM — M40.10 KYPHOSIS DUE TO OSTEOPOROSIS: Status: ACTIVE | Noted: 2021-07-03

## 2021-07-15 ENCOUNTER — APPOINTMENT (OUTPATIENT)
Dept: RADIOLOGY | Facility: MEDICAL CENTER | Age: 86
End: 2021-07-15
Attending: EMERGENCY MEDICINE
Payer: MEDICARE

## 2021-07-15 ENCOUNTER — HOSPITAL ENCOUNTER (OUTPATIENT)
Facility: MEDICAL CENTER | Age: 86
End: 2021-07-16
Attending: EMERGENCY MEDICINE | Admitting: INTERNAL MEDICINE
Payer: MEDICARE

## 2021-07-15 ENCOUNTER — APPOINTMENT (OUTPATIENT)
Dept: RADIOLOGY | Facility: MEDICAL CENTER | Age: 86
End: 2021-07-15
Attending: NURSE PRACTITIONER
Payer: MEDICARE

## 2021-07-15 DIAGNOSIS — G45.9 TIA (TRANSIENT ISCHEMIC ATTACK): ICD-10-CM

## 2021-07-15 PROBLEM — R29.90 STROKE-LIKE SYMPTOMS: Status: ACTIVE | Noted: 2021-07-15

## 2021-07-15 LAB
ABO GROUP BLD: NORMAL
ALBUMIN SERPL BCP-MCNC: 3.7 G/DL (ref 3.2–4.9)
ALBUMIN/GLOB SERPL: 1.3 G/DL
ALP SERPL-CCNC: 88 U/L (ref 30–99)
ALT SERPL-CCNC: 9 U/L (ref 2–50)
ANION GAP SERPL CALC-SCNC: 10 MMOL/L (ref 7–16)
APTT PPP: 34 SEC (ref 24.7–36)
AST SERPL-CCNC: 22 U/L (ref 12–45)
BASOPHILS # BLD AUTO: 0.4 % (ref 0–1.8)
BASOPHILS # BLD: 0.03 K/UL (ref 0–0.12)
BILIRUB SERPL-MCNC: 0.4 MG/DL (ref 0.1–1.5)
BLD GP AB SCN SERPL QL: NORMAL
BUN SERPL-MCNC: 18 MG/DL (ref 8–22)
CALCIUM SERPL-MCNC: 9.6 MG/DL (ref 8.5–10.5)
CHLORIDE SERPL-SCNC: 102 MMOL/L (ref 96–112)
CHOLEST SERPL-MCNC: 170 MG/DL (ref 100–199)
CO2 SERPL-SCNC: 21 MMOL/L (ref 20–33)
CREAT SERPL-MCNC: 0.75 MG/DL (ref 0.5–1.4)
EKG IMPRESSION: NORMAL
EOSINOPHIL # BLD AUTO: 0.16 K/UL (ref 0–0.51)
EOSINOPHIL NFR BLD: 2.2 % (ref 0–6.9)
ERYTHROCYTE [DISTWIDTH] IN BLOOD BY AUTOMATED COUNT: 47.7 FL (ref 35.9–50)
EST. AVERAGE GLUCOSE BLD GHB EST-MCNC: 123 MG/DL
GLOBULIN SER CALC-MCNC: 2.8 G/DL (ref 1.9–3.5)
GLUCOSE SERPL-MCNC: 96 MG/DL (ref 65–99)
HBA1C MFR BLD: 5.9 % (ref 4–5.6)
HCT VFR BLD AUTO: 36.8 % (ref 37–47)
HDLC SERPL-MCNC: 56 MG/DL
HGB BLD-MCNC: 12.1 G/DL (ref 12–16)
IMM GRANULOCYTES # BLD AUTO: 0.04 K/UL (ref 0–0.11)
IMM GRANULOCYTES NFR BLD AUTO: 0.6 % (ref 0–0.9)
INR PPP: 1.01 (ref 0.87–1.13)
LDLC SERPL CALC-MCNC: 93 MG/DL
LYMPHOCYTES # BLD AUTO: 1.32 K/UL (ref 1–4.8)
LYMPHOCYTES NFR BLD: 18.4 % (ref 22–41)
MCH RBC QN AUTO: 30.2 PG (ref 27–33)
MCHC RBC AUTO-ENTMCNC: 32.9 G/DL (ref 33.6–35)
MCV RBC AUTO: 91.8 FL (ref 81.4–97.8)
MONOCYTES # BLD AUTO: 0.65 K/UL (ref 0–0.85)
MONOCYTES NFR BLD AUTO: 9.1 % (ref 0–13.4)
NEUTROPHILS # BLD AUTO: 4.98 K/UL (ref 2–7.15)
NEUTROPHILS NFR BLD: 69.3 % (ref 44–72)
NRBC # BLD AUTO: 0 K/UL
NRBC BLD-RTO: 0 /100 WBC
PLATELET # BLD AUTO: 160 K/UL (ref 164–446)
PMV BLD AUTO: 9 FL (ref 9–12.9)
POTASSIUM SERPL-SCNC: 4.4 MMOL/L (ref 3.6–5.5)
PROT SERPL-MCNC: 6.5 G/DL (ref 6–8.2)
PROTHROMBIN TIME: 13 SEC (ref 12–14.6)
RBC # BLD AUTO: 4.01 M/UL (ref 4.2–5.4)
RH BLD: NORMAL
SODIUM SERPL-SCNC: 133 MMOL/L (ref 135–145)
TRIGL SERPL-MCNC: 103 MG/DL (ref 0–149)
TROPONIN T SERPL-MCNC: 27 NG/L (ref 6–19)
WBC # BLD AUTO: 7.2 K/UL (ref 4.8–10.8)

## 2021-07-15 PROCEDURE — 86900 BLOOD TYPING SEROLOGIC ABO: CPT

## 2021-07-15 PROCEDURE — 80061 LIPID PANEL: CPT

## 2021-07-15 PROCEDURE — 93005 ELECTROCARDIOGRAM TRACING: CPT | Performed by: EMERGENCY MEDICINE

## 2021-07-15 PROCEDURE — 84484 ASSAY OF TROPONIN QUANT: CPT

## 2021-07-15 PROCEDURE — A9270 NON-COVERED ITEM OR SERVICE: HCPCS | Performed by: EMERGENCY MEDICINE

## 2021-07-15 PROCEDURE — 0042T CT-CEREBRAL PERFUSION ANALYSIS: CPT

## 2021-07-15 PROCEDURE — 85730 THROMBOPLASTIN TIME PARTIAL: CPT

## 2021-07-15 PROCEDURE — 83036 HEMOGLOBIN GLYCOSYLATED A1C: CPT

## 2021-07-15 PROCEDURE — 70496 CT ANGIOGRAPHY HEAD: CPT | Mod: MG

## 2021-07-15 PROCEDURE — 71045 X-RAY EXAM CHEST 1 VIEW: CPT

## 2021-07-15 PROCEDURE — 700117 HCHG RX CONTRAST REV CODE 255: Performed by: EMERGENCY MEDICINE

## 2021-07-15 PROCEDURE — 70450 CT HEAD/BRAIN W/O DYE: CPT | Mod: MG

## 2021-07-15 PROCEDURE — 99220 PR INITIAL OBSERVATION CARE,LEVL III: CPT | Performed by: INTERNAL MEDICINE

## 2021-07-15 PROCEDURE — 80053 COMPREHEN METABOLIC PANEL: CPT

## 2021-07-15 PROCEDURE — 86901 BLOOD TYPING SEROLOGIC RH(D): CPT

## 2021-07-15 PROCEDURE — 99285 EMERGENCY DEPT VISIT HI MDM: CPT

## 2021-07-15 PROCEDURE — G0378 HOSPITAL OBSERVATION PER HR: HCPCS

## 2021-07-15 PROCEDURE — 85025 COMPLETE CBC W/AUTO DIFF WBC: CPT

## 2021-07-15 PROCEDURE — 99205 OFFICE O/P NEW HI 60 MIN: CPT | Performed by: PSYCHIATRY & NEUROLOGY

## 2021-07-15 PROCEDURE — 70498 CT ANGIOGRAPHY NECK: CPT | Mod: MG

## 2021-07-15 PROCEDURE — 700102 HCHG RX REV CODE 250 W/ 637 OVERRIDE(OP): Performed by: NURSE PRACTITIONER

## 2021-07-15 PROCEDURE — 86850 RBC ANTIBODY SCREEN: CPT

## 2021-07-15 PROCEDURE — 70551 MRI BRAIN STEM W/O DYE: CPT | Mod: ME

## 2021-07-15 PROCEDURE — 85610 PROTHROMBIN TIME: CPT

## 2021-07-15 PROCEDURE — A9270 NON-COVERED ITEM OR SERVICE: HCPCS | Performed by: NURSE PRACTITIONER

## 2021-07-15 PROCEDURE — 700102 HCHG RX REV CODE 250 W/ 637 OVERRIDE(OP): Performed by: EMERGENCY MEDICINE

## 2021-07-15 RX ORDER — VITAMIN B COMPLEX
1000 TABLET ORAL DAILY
Status: DISCONTINUED | OUTPATIENT
Start: 2021-07-15 | End: 2021-07-16 | Stop reason: HOSPADM

## 2021-07-15 RX ORDER — ASPIRIN 300 MG/1
300 SUPPOSITORY RECTAL DAILY
Status: DISCONTINUED | OUTPATIENT
Start: 2021-07-15 | End: 2021-07-16 | Stop reason: HOSPADM

## 2021-07-15 RX ORDER — CLONIDINE 0.1 MG/24H
1 PATCH, EXTENDED RELEASE TRANSDERMAL
Status: DISCONTINUED | OUTPATIENT
Start: 2021-07-15 | End: 2021-07-16 | Stop reason: HOSPADM

## 2021-07-15 RX ORDER — ASPIRIN 325 MG
325 TABLET ORAL DAILY
Status: DISCONTINUED | OUTPATIENT
Start: 2021-07-15 | End: 2021-07-16 | Stop reason: HOSPADM

## 2021-07-15 RX ORDER — HYDRALAZINE HYDROCHLORIDE 25 MG/1
50 TABLET, FILM COATED ORAL ONCE
Status: COMPLETED | OUTPATIENT
Start: 2021-07-15 | End: 2021-07-15

## 2021-07-15 RX ORDER — ASPIRIN 81 MG/1
324 TABLET, CHEWABLE ORAL DAILY
Status: DISCONTINUED | OUTPATIENT
Start: 2021-07-15 | End: 2021-07-16 | Stop reason: HOSPADM

## 2021-07-15 RX ORDER — FUROSEMIDE 20 MG/1
20 TABLET ORAL
Status: DISCONTINUED | OUTPATIENT
Start: 2021-07-15 | End: 2021-07-16 | Stop reason: HOSPADM

## 2021-07-15 RX ORDER — DULOXETIN HYDROCHLORIDE 60 MG/1
60 CAPSULE, DELAYED RELEASE ORAL EVERY MORNING
Status: DISCONTINUED | OUTPATIENT
Start: 2021-07-15 | End: 2021-07-16 | Stop reason: HOSPADM

## 2021-07-15 RX ORDER — DOXAZOSIN 2 MG/1
2 TABLET ORAL
Status: DISCONTINUED | OUTPATIENT
Start: 2021-07-15 | End: 2021-07-16 | Stop reason: HOSPADM

## 2021-07-15 RX ORDER — ATORVASTATIN CALCIUM 40 MG/1
40 TABLET, FILM COATED ORAL EVERY EVENING
Status: DISCONTINUED | OUTPATIENT
Start: 2021-07-15 | End: 2021-07-16 | Stop reason: HOSPADM

## 2021-07-15 RX ORDER — VALSARTAN 80 MG/1
320 TABLET ORAL EVERY MORNING
Status: DISCONTINUED | OUTPATIENT
Start: 2021-07-15 | End: 2021-07-16 | Stop reason: HOSPADM

## 2021-07-15 RX ORDER — HYDRALAZINE HYDROCHLORIDE 25 MG/1
50 TABLET, FILM COATED ORAL 4 TIMES DAILY
Status: DISCONTINUED | OUTPATIENT
Start: 2021-07-15 | End: 2021-07-16 | Stop reason: HOSPADM

## 2021-07-15 RX ORDER — SODIUM CHLORIDE, SODIUM LACTATE, POTASSIUM CHLORIDE, CALCIUM CHLORIDE 600; 310; 30; 20 MG/100ML; MG/100ML; MG/100ML; MG/100ML
INJECTION, SOLUTION INTRAVENOUS CONTINUOUS
Status: DISCONTINUED | OUTPATIENT
Start: 2021-07-15 | End: 2021-07-15

## 2021-07-15 RX ORDER — VALSARTAN 80 MG/1
320 TABLET ORAL ONCE
Status: COMPLETED | OUTPATIENT
Start: 2021-07-15 | End: 2021-07-15

## 2021-07-15 RX ORDER — SPIRONOLACTONE 25 MG/1
25 TABLET ORAL
Status: DISCONTINUED | OUTPATIENT
Start: 2021-07-15 | End: 2021-07-16 | Stop reason: HOSPADM

## 2021-07-15 RX ADMIN — ATORVASTATIN CALCIUM 40 MG: 40 TABLET, FILM COATED ORAL at 18:04

## 2021-07-15 RX ADMIN — IOHEXOL 40 ML: 350 INJECTION, SOLUTION INTRAVENOUS at 10:15

## 2021-07-15 RX ADMIN — IOHEXOL 100 ML: 350 INJECTION, SOLUTION INTRAVENOUS at 10:15

## 2021-07-15 RX ADMIN — DULOXETINE HYDROCHLORIDE 60 MG: 60 CAPSULE, DELAYED RELEASE ORAL at 16:21

## 2021-07-15 RX ADMIN — CLONIDINE 1 PATCH: 0.1 PATCH TRANSDERMAL at 18:04

## 2021-07-15 RX ADMIN — HYDRALAZINE HYDROCHLORIDE 50 MG: 25 TABLET, FILM COATED ORAL at 11:17

## 2021-07-15 RX ADMIN — VALSARTAN 320 MG: 80 TABLET, FILM COATED ORAL at 11:16

## 2021-07-15 RX ADMIN — VALSARTAN 320 MG: 80 TABLET, FILM COATED ORAL at 16:22

## 2021-07-15 RX ADMIN — HYDRALAZINE HYDROCHLORIDE 50 MG: 25 TABLET, FILM COATED ORAL at 20:53

## 2021-07-15 RX ADMIN — DOXAZOSIN 2 MG: 2 TABLET ORAL at 20:53

## 2021-07-15 RX ADMIN — HYDRALAZINE HYDROCHLORIDE 50 MG: 25 TABLET, FILM COATED ORAL at 18:04

## 2021-07-15 RX ADMIN — SPIRONOLACTONE 25 MG: 25 TABLET ORAL at 16:24

## 2021-07-15 RX ADMIN — Medication 1000 UNITS: at 16:21

## 2021-07-15 RX ADMIN — FUROSEMIDE 20 MG: 20 TABLET ORAL at 16:21

## 2021-07-15 ASSESSMENT — ENCOUNTER SYMPTOMS
WEAKNESS: 0
BLURRED VISION: 0
WHEEZING: 0
SHORTNESS OF BREATH: 0
TREMORS: 0
DIZZINESS: 0
NAUSEA: 0
COUGH: 0
TINGLING: 0
ABDOMINAL PAIN: 0
PALPITATIONS: 0
WEIGHT LOSS: 0
DOUBLE VISION: 0
SPEECH CHANGE: 1
VOMITING: 0
DIARRHEA: 0
EYE PAIN: 0
SENSORY CHANGE: 0
FOCAL WEAKNESS: 0
HEADACHES: 1
HEARTBURN: 1

## 2021-07-15 ASSESSMENT — LIFESTYLE VARIABLES
DO YOU DRINK ALCOHOL: NO
DOES PATIENT WANT TO STOP DRINKING: NO
TOTAL SCORE: 0
EVER FELT BAD OR GUILTY ABOUT YOUR DRINKING: NO
CONSUMPTION TOTAL: INCOMPLETE
TOTAL SCORE: 0
HAVE YOU EVER FELT YOU SHOULD CUT DOWN ON YOUR DRINKING: NO
HOW MANY TIMES IN THE PAST YEAR HAVE YOU HAD 5 OR MORE DRINKS IN A DAY: 0
AVERAGE NUMBER OF DAYS PER WEEK YOU HAVE A DRINK CONTAINING ALCOHOL: 0
CONSUMPTION TOTAL: NEGATIVE
EVER FELT BAD OR GUILTY ABOUT YOUR DRINKING: NO
ALCOHOL_USE: NO
HAVE PEOPLE ANNOYED YOU BY CRITICIZING YOUR DRINKING: NO
TOTAL SCORE: 0
TOTAL SCORE: 0
HAVE PEOPLE ANNOYED YOU BY CRITICIZING YOUR DRINKING: NO
TOTAL SCORE: 0
EVER HAD A DRINK FIRST THING IN THE MORNING TO STEADY YOUR NERVES TO GET RID OF A HANGOVER: NO
HAVE YOU EVER FELT YOU SHOULD CUT DOWN ON YOUR DRINKING: NO
ON A TYPICAL DAY WHEN YOU DRINK ALCOHOL HOW MANY DRINKS DO YOU HAVE: 0
EVER HAD A DRINK FIRST THING IN THE MORNING TO STEADY YOUR NERVES TO GET RID OF A HANGOVER: NO
TOTAL SCORE: 0

## 2021-07-15 ASSESSMENT — PAIN DESCRIPTION - PAIN TYPE
TYPE: ACUTE PAIN

## 2021-07-15 ASSESSMENT — PATIENT HEALTH QUESTIONNAIRE - PHQ9
1. LITTLE INTEREST OR PLEASURE IN DOING THINGS: NOT AT ALL
1. LITTLE INTEREST OR PLEASURE IN DOING THINGS: NOT AT ALL
2. FEELING DOWN, DEPRESSED, IRRITABLE, OR HOPELESS: NOT AT ALL
SUM OF ALL RESPONSES TO PHQ9 QUESTIONS 1 AND 2: 0
SUM OF ALL RESPONSES TO PHQ9 QUESTIONS 1 AND 2: 0
2. FEELING DOWN, DEPRESSED, IRRITABLE, OR HOPELESS: NOT AT ALL

## 2021-07-15 ASSESSMENT — FIBROSIS 4 INDEX
FIB4 SCORE: 4.125
FIB4 SCORE: 2.88

## 2021-07-15 NOTE — ED NOTES
Pt to room via [employer], report received. Assumed care of pt. Pt awake and alert, A&Ox3. Intermittent expressive dysphagia noted, speech clear. Pt reports occipital headache, unclear time frame. Hx of cervical spine sx. Pt noted to be hypertensive w history of same, denies chest pain or sob. On antihypertensives including a clonidine transdermal patch, in place on L upper back.     Face symmetrical, speech clear, moving all extremities w equal strength against resistance. BP/SpO2/ECG monitoring in place. NSR on monitor.

## 2021-07-15 NOTE — ED NOTES
Patient transported via gurney on tele monitor to T204. Patient is currently neurologically intact, AOx4, VSS.

## 2021-07-15 NOTE — H&P
Hospital Medicine History & Physical Note    Date of Service  7/15/2021    Primary Care Physician  Jaswinder Iqbal M.D.    Consultants  neurology    Specialist Names: Dr. Thrasher    Code Status  Full Code    Chief Complaint  Chief Complaint   Patient presents with   • Aphasia     onset at noon yesterday, Pt lives in assisted living but did not alert staff until they checked on her this morning & noted pt to be having difficulty getting words out. [facility] called - state pt was very difficult to understand at times & speaking some words in Congolese - although A & O x4. Currently, aphasia has subsided except for occasional difficulty with single words. No head trauma or blood thinners. No other neuro deficits. Temp 99.8 at facility per staff. .     History of Presenting Illness  Destiny Burns is a 89 y.o. female with a past medical history of hypertension, renal artery stenosis, and chronic venous insufficiency who presented 7/15/2021 with expressive aphasia since noontime yesterday.  Since then her symptoms have improved though she is not yet back to baseline.  In the ED, she was initially very hypertensive which improved with oral hydralazine and valsartan. A CT perfusion study was normal and CTAs of the head and neck did not show any large vessel occlusion or stenosis.  Neurology was consulted from the emergency department and have recommended against permissive hypertension at this time. They have opted for aspirin and statin management and MRI imaging. We will also get an echocardiogram with bubble study and monitor her on telemetry.  She is now being admitted to the clinical decision unit pending further evaluation and treatment.    I discussed the plan of care with patient, bedside RN, charge RN,  and neurology.    Review of Systems  Review of Systems   Constitutional: Negative for malaise/fatigue and weight loss.   Eyes: Negative for blurred vision, double vision and pain.   Respiratory:  Negative for cough, shortness of breath and wheezing.    Cardiovascular: Negative for chest pain and palpitations.   Gastrointestinal: Positive for heartburn. Negative for abdominal pain, diarrhea, nausea and vomiting.   Neurological: Positive for speech change and headaches. Negative for dizziness, tingling, tremors, sensory change, focal weakness and weakness.   All other systems reviewed and are negative.      Past Medical History   has a past medical history of Chronic venous insufficiency (5/26/2016), Hypertension (5/26/2016), LLQ abdominal mass (5/26/2016), Palpitations (5/26/2016), and WILLIAN (renal artery stenosis) (HCC) (5/26/2016).    Surgical History  Reviewed. Not pertinent.     Family History  family history includes Cancer in her mother and other family members; Diabetes in her father.     Social History   reports that she has never smoked. She has never used smokeless tobacco. She reports that she does not drink alcohol and does not use drugs.    Allergies  Allergies   Allergen Reactions   • Acyclovir    • Ciprofloxacin    • Citalopram    • Lyrica    • Neurontin [Gabapentin]    • Norvasc [Amlodipine]    • Morphine    • Tramadol      Drowsy, dizzy, depression   • [place] Itching     Itchy throat     Medications  Prior to Admission Medications   Prescriptions Last Dose Informant Patient Reported? Taking?   DULoxetine (CYMBALTA) 60 MG Cap DR Particles delayed-release capsule 7/14/2021 at 0800  Yes No   Sig: Take 60 mg by mouth every morning.   Multiple Vitamins-Minerals (PRESERVISION AREDS PO) 7/14/2021 at 1700  Yes No   Sig: Take 1 tablet by mouth 2 times a day.   Simethicone (GAS RELIEF 125 MAX ST PO) 7/14/2021 at 0800  Yes No   Sig: Take 1 capsule by mouth every morning.   acetaminophen (TYLENOL) 325 MG Tab 7/14/2021 at 1700  Yes No   Sig: Take 650 mg by mouth 3 times a day.   cloNIDine (CATAPRES) 0.1 MG/24HR PATCH WEEKLY patch 7/8/2021 at Unknown time  No No   Sig: APPLY 1 PATCH TOPICALLY EVERY SEVEN  DAYS , REMOVE OLD PATCH BEFORE APPLYING NEW ONE   Patient taking differently: Place 1 Patch on the skin every 7 days.   diclofenac sodium 1 % Gel 7/14/2021 at 1700  Yes No   Sig: Apply 2 g topically 3 times a day.   docusate sodium (COLACE) 100 MG Cap 7/14/2021 at 0800  Yes No   Sig: Take 100 mg by mouth every morning.   doxazosin (CARDURA) 2 MG Tab 7/14/2021 at 2000 Patient Yes No   Sig: Take 2 mg by mouth at bedtime.   furosemide (LASIX) 20 MG Tab 7/13/2021 at 0800  Yes No   Sig: Take 20 mg by mouth every 48 hours.   hydrALAZINE (APRESOLINE) 50 MG Tab 7/14/2021 at 2000  No No   Sig: Take 1 tablet by mouth 4 times a day.   pantoprazole (PROTONIX) 20 MG tablet 7/14/2021 at 1700  No No   Sig: TAKE 1 TABLET BY MOUTH TWICE A DAY   Patient taking differently: Take 20 mg by mouth 2 times a day.   spironolactone (ALDACTONE) 25 MG Tab 7/14/2021 at 0800  Yes No   Sig: Take 25 mg by mouth every 48 hours.   valsartan (DIOVAN) 320 MG tablet 7/14/2021 at 0800  No No   Sig: Take 1 Tab by mouth every morning.   vitamin D (CHOLECALCIFEROL) 1000 Unit Tab 7/14/2021 at 0800  Yes No   Sig: Take 1,000 Units by mouth every day.      Facility-Administered Medications: None     Physical Exam  Temp:  [36.7 °C (98 °F)] 36.7 °C (98 °F)  Pulse:  [64-83] 81  Resp:  [12-28] 18  BP: (124-209)/() 124/65  SpO2:  [91 %-93 %] 92 %    Physical Exam  Vitals and nursing note reviewed.   Constitutional:       General: She is not in acute distress.     Appearance: She is not toxic-appearing.   HENT:      Head: Normocephalic.      Right Ear: External ear normal.      Left Ear: External ear normal.      Nose: Nose normal.      Mouth/Throat:      Mouth: Mucous membranes are moist.      Pharynx: Oropharynx is clear.   Eyes:      General:         Right eye: No discharge.         Left eye: No discharge.      Extraocular Movements: Extraocular movements intact.      Pupils: Pupils are equal, round, and reactive to light.   Cardiovascular:      Rate and  Rhythm: Normal rate.      Heart sounds: Murmur heard.     Pulmonary:      Effort: Pulmonary effort is normal. No respiratory distress.      Breath sounds: No wheezing or rales.   Abdominal:      General: There is no distension.      Palpations: Abdomen is soft.      Tenderness: There is no abdominal tenderness. There is no guarding.   Musculoskeletal:      Cervical back: No rigidity or tenderness.      Right lower leg: No edema.      Left lower leg: No edema.   Skin:     General: Skin is warm and dry.      Coloration: Skin is pale.   Neurological:      Mental Status: She is alert and oriented to person, place, and time.      Cranial Nerves: No facial asymmetry.      Sensory: No sensory deficit.      Motor: No weakness.      Coordination: Coordination normal. Finger-Nose-Finger Test normal.      Comments: Expressive aphasia   Follows simple commands      Psychiatric:         Mood and Affect: Mood normal.         Judgment: Judgment normal.         Laboratory:  Recent Labs     07/15/21  0940   WBC 7.2   RBC 4.01*   HEMOGLOBIN 12.1   HEMATOCRIT 36.8*   MCV 91.8   MCH 30.2   MCHC 32.9*   RDW 47.7   PLATELETCT 160*   MPV 9.0     Recent Labs     07/15/21  0940   SODIUM 133*   POTASSIUM 4.4   CHLORIDE 102   CO2 21   GLUCOSE 96   BUN 18   CREATININE 0.75   CALCIUM 9.6     Recent Labs     07/15/21  0940   ALTSGPT 9   ASTSGOT 22   ALKPHOSPHAT 88   TBILIRUBIN 0.4   GLUCOSE 96     Recent Labs     07/15/21  0940   APTT 34.0   INR 1.01     No results for input(s): NTPROBNP in the last 72 hours.      Recent Labs     07/15/21  0940   TROPONINT 27*     Imaging:  DX-CHEST-PORTABLE (1 VIEW)   Final Result      No acute cardiopulmonary abnormality.      CT-CTA NECK WITH & W/O-POST PROCESSING   Final Result      1.  Moderate calcified plaque at the bilateral carotid bifurcations and bulbs, right greater than left, with less than 50% stenosis by NASCET criteria.   2.  Limited evaluation of the mid cervical ICAs and the mid to distal  vertebral arteries due to artifact from patient's extensive spinal fusion hardware as well as dental hardware.      CT-CTA HEAD WITH & W/O-POST PROCESS   Final Result      No occlusion, hemodynamically significant stenosis or aneurysm.      CT-CEREBRAL PERFUSION ANALYSIS   Final Result      1.  Cerebral blood flow less than 30% likely representing completed infarct = 0 mL.      2.  T Max more than 6 seconds likely representing combination of completed infarct and ischemia = 0 mL.      3.  Mismatched volume likely representing ischemic brain/penumbra = None      4.  Please note that the cerebral perfusion was performed on the limited brain tissue around the basal ganglia region. Infarct/ischemia outside the CT perfusion sections can be missed in this study.      CT-HEAD W/O   Final Result      1.  Cerebral atrophy.      2.  Otherwise, Head CT without contrast within normal limits. No evidence of acute hemorrhage or mass effect.      EC-ECHOCARDIOGRAM COMPLETE W/O CONT    (Results Pending)   MR-BRAIN-W/O    (Results Pending)     X-Ray:  I have personally reviewed the images and compared with prior images.  EKG:  I have personally reviewed the images and compared with prior images.    Assessment/Plan:  I anticipate this patient is appropriate for observation status at this time.    * Stroke-like symptoms- (present on admission)  Assessment & Plan  Aphasic since yesterday at noon   Neurology consulted by ERP , no need to continue permissive HTN at this time  MRI brain w/o ordered   ASA, statin   Tele monitoring   Echo with bubble study   Lipid panel   PT/OT/SLP consult     Thrombocytopenia (HCC)- (present on admission)  Assessment & Plan  Chronic and stable   Monitor     Essential hypertension- (present on admission)  Assessment & Plan  Maintain SBP < 140   Resume home antihypertensives      Dyslipidemia- (present on admission)  Assessment & Plan  Resume statin       VTE prophylaxis: enoxaparin ppx

## 2021-07-15 NOTE — ED TRIAGE NOTES
Destiny Tessy Burns  89 y.o.  female  Chief Complaint   Patient presents with   • Aphasia     onset at noon yesterday, Pt lives in assisted living but did not alert staff until they checked on her this morning & noted pt to be having difficulty getting words out. ELEANOR called - state pt was very difficult to understand at times & speaking some words in Occitan - although A & O x4. Currently, aphasia has subsided except for occasional difficulty with single words. No head trauma or blood thinners. No other neuro deficits. Temp 99.8 at facility per staff. .       Stroke IR - direct to CT.

## 2021-07-15 NOTE — ED NOTES
Patient was unable to complete MRI per MRI tech. Patient was placed back in room.    Telephone report given to SANDRO Flores for patient going to CDU.

## 2021-07-15 NOTE — ASSESSMENT & PLAN NOTE
Aphasic since yesterday at noon   Neurology consulted by ERP , no need to continue permissive HTN at this time  MRI brain w/o ordered   ASA, statin   Tele monitoring   Echo with bubble study   Lipid panel   PT/OT/SLP consult

## 2021-07-15 NOTE — PROGRESS NOTES
Report received, pt care assumed. Pt ambulate to bed with hand held assist, unsteady gait. Tele monitor on. Call light and personal belongings within reach of pt. Will continue to monitor.

## 2021-07-15 NOTE — DISCHARGE PLANNING
[facility] Transitional [facility] Coordination    Referral from:  April QUILES  Insurance Provider on Facesheet:  Potential Rehab Diagnosis: stroke work up    Chart review indicates patient has on going medical management and may have  therapy needs to possibly meet inpatient rehab facility criteria with the goal of returning to community.    D/C support:  Chart notes indicate pt resides at [facility].     Physiatry consultation pended per protocol - pending work up/ therapy evals when medically appropriate.         Thank you for the referral.

## 2021-07-15 NOTE — CONSULTS
Consults   Hospital Neurology Consult:    Referring Physician: VIMAL Posadas*    Reason for consultation: aphasis    HPI: Destiny Burns is a 89 y.o. female with history of neuropathy and HTN presenting to the hospital for aphasia. Prior episodes of aphasia that are typically transient. Today she arrives with aphasia in the setting of HTN. Her and sister endorse that this is the longest it has ever lasted. She also endorse headaches in the occiput which are not new. She denies any focal weakness, sensory loss, nausea,vomiting vision loss or other neurological symptoms.    ROS:     As above. All other systems reviewed and are negative.    Past Medical History:    has a past medical history of Chronic venous insufficiency (5/26/2016), Hypertension (5/26/2016), LLQ abdominal mass (5/26/2016), Palpitations (5/26/2016), and WILLIAN (renal artery stenosis) (HCC) (5/26/2016).    FHx:  family history includes Cancer in her mother and other family members; Diabetes in her father.    SHx:   reports that she has never smoked. She has never used smokeless tobacco. She reports that she does not drink alcohol and does not use drugs.    Allergies:  Allergies   Allergen Reactions   • Acyclovir    • Ciprofloxacin    • Citalopram    • Lyrica    • Neurontin [Gabapentin]    • Norvasc [Amlodipine]    • Morphine    • Tramadol      Drowsy, dizzy, depression   • [place] Itching     Itchy throat       Medications:    Current Facility-Administered Medications:   •  cloNIDine (CATAPRES) 0.1 MG/24HR patch 1 Patch, 1 Patch, Transdermal, Q7 DAYS, April TORREZ Heglar, A.P.R.N.  •  doxazosin (CARDURA) tablet 2 mg, 2 mg, Oral, QHS, April A Heglar, A.P.R.N.  •  DULoxetine (CYMBALTA) capsule 60 mg, 60 mg, Oral, QAM, April A Heglar, A.P.R.N.  •  furosemide (LASIX) tablet 20 mg, 20 mg, Oral, Q48HRS, April A Heglar, A.P.R.N.  •  hydrALAZINE (APRESOLINE) tablet 50 mg, 50 mg, Oral, 4X/DAY, ABELARDO English.RLaishaN.  •  spironolactone  (ALDACTONE) tablet 25 mg, 25 mg, Oral, Q48HRS, April Davidsonar, A.P.R.N.  •  valsartan (DIOVAN) tablet 320 mg, 320 mg, Oral, QAM, April Chinoglar, A.P.R.N.  •  vitamin D (Cholecalciferol) tablet 1,000 Units, 1,000 Units, Oral, DAILY, April Gallegos, A.P.R.N.  •  [START ON 7/16/2021] enoxaparin (LOVENOX) inj 40 mg, 40 mg, Subcutaneous, DAILY AT 1800, April Gallegos, A.P.R.N.  •  atorvastatin (LIPITOR) tablet 40 mg, 40 mg, Oral, Q EVENING, April Gallegos, A.P.R.N.  •  aspirin (ASA) tablet 325 mg, 325 mg, Oral, DAILY **OR** aspirin (ASA) chewable tab 324 mg, 324 mg, Oral, DAILY **OR** aspirin (ASA) suppository 300 mg, 300 mg, Rectal, DAILY, April Gallegos, A.P.R.N.    Current Outpatient Medications:   •  hydrALAZINE (APRESOLINE) 50 MG Tab, Take 1 tablet by mouth 4 times a day., Disp: 120 tablet, Rfl: 5  •  Simethicone (GAS RELIEF 125 MAX ST PO), Take 1 capsule by mouth every morning., Disp: , Rfl:   •  acetaminophen (TYLENOL) 325 MG Tab, Take 650 mg by mouth 3 times a day., Disp: , Rfl:   •  docusate sodium (COLACE) 100 MG Cap, Take 100 mg by mouth every morning., Disp: , Rfl:   •  DULoxetine (CYMBALTA) 60 MG Cap DR Particles delayed-release capsule, Take 60 mg by mouth every morning., Disp: , Rfl:   •  furosemide (LASIX) 20 MG Tab, Take 20 mg by mouth every 48 hours., Disp: , Rfl:   •  Multiple Vitamins-Minerals (PRESERVISION AREDS PO), Take 1 tablet by mouth 2 times a day., Disp: , Rfl:   •  spironolactone (ALDACTONE) 25 MG Tab, Take 25 mg by mouth every 48 hours., Disp: , Rfl:   •  vitamin D (CHOLECALCIFEROL) 1000 Unit Tab, Take 1,000 Units by mouth every day., Disp: , Rfl:   •  diclofenac sodium 1 % Gel, Apply 2 g topically 3 times a day., Disp: , Rfl:   •  cloNIDine (CATAPRES) 0.1 MG/24HR PATCH WEEKLY patch, APPLY 1 PATCH TOPICALLY EVERY SEVEN DAYS , REMOVE OLD PATCH BEFORE APPLYING NEW ONE (Patient taking differently: Place 1 Patch on the skin every 7 days.), Disp: 4 Patch, Rfl: 11  •  valsartan  (DIOVAN) 320 MG tablet, Take 1 Tab by mouth every morning., Disp: 90 Tab, Rfl: 3  •  pantoprazole (PROTONIX) 20 MG tablet, TAKE 1 TABLET BY MOUTH TWICE A DAY (Patient taking differently: Take 20 mg by mouth 2 times a day.), Disp: 180 Tab, Rfl: 1  •  doxazosin (CARDURA) 2 MG Tab, Take 2 mg by mouth at bedtime., Disp: , Rfl:     Vitals:   Vitals:    07/15/21 1231 07/15/21 1253 07/15/21 1300 07/15/21 1330   BP:   137/68 124/65   Pulse: 81 78 81 77   Resp: 15 16 18    Temp:       TempSrc:       SpO2: 92% 92% 91% 91%   Weight:       Height:           Labs:  Lab Results   Component Value Date/Time    PROTHROMBTM 13.0 07/15/2021 09:40 AM    INR 1.01 07/15/2021 09:40 AM      Lab Results   Component Value Date/Time    WBC 7.2 07/15/2021 09:40 AM    RBC 4.01 (L) 07/15/2021 09:40 AM    HEMOGLOBIN 12.1 07/15/2021 09:40 AM    HEMATOCRIT 36.8 (L) 07/15/2021 09:40 AM    MCV 91.8 07/15/2021 09:40 AM    MCH 30.2 07/15/2021 09:40 AM    MCHC 32.9 (L) 07/15/2021 09:40 AM    MPV 9.0 07/15/2021 09:40 AM    NEUTSPOLYS 69.30 07/15/2021 09:40 AM    LYMPHOCYTES 18.40 (L) 07/15/2021 09:40 AM    MONOCYTES 9.10 07/15/2021 09:40 AM    EOSINOPHILS 2.20 07/15/2021 09:40 AM    BASOPHILS 0.40 07/15/2021 09:40 AM    HYPOCHROMIA 1+ 02/05/2009 10:27 AM      Lab Results   Component Value Date/Time    SODIUM 133 (L) 07/15/2021 09:40 AM    POTASSIUM 4.4 07/15/2021 09:40 AM    CHLORIDE 102 07/15/2021 09:40 AM    CO2 21 07/15/2021 09:40 AM    GLUCOSE 96 07/15/2021 09:40 AM    BUN 18 07/15/2021 09:40 AM    CREATININE 0.75 07/15/2021 09:40 AM    CREATININE 1.0 03/11/2009 08:41 AM      Lab Results   Component Value Date/Time    CHOLSTRLTOT 146 08/27/2019 03:57 AM    LDL 85 08/27/2019 03:57 AM    HDL 46 08/27/2019 03:57 AM    TRIGLYCERIDE 77 08/27/2019 03:57 AM       Lab Results   Component Value Date/Time    ALKPHOSPHAT 88 07/15/2021 09:40 AM    ASTSGOT 22 07/15/2021 09:40 AM    ALTSGPT 9 07/15/2021 09:40 AM    TBILIRUBIN 0.4 07/15/2021 09:40 AM      No  results found for: TSH  No results found for: FREET4  No results found for: FREET3    Imaging/Testing:  CT head no ICH or early ischemic changes. CTA with no LVO    Neurologic:  Mental Status:  AAOx4. Able to follow commands/cross midline. Speech is nondysarthric. Some expressive aphasia with anomia.  Cranial Nerves: *PERRL. EOMi. Face symmetric, palate/tongue midline. Visual fields full to confrontation. Facial sensation intact.   Motor: Normal muscle tone and bulk. Strength is 5/5 throughout. No abnormal movements.  Reflexes: Flexor plantar  responses bilaterally.   Coordination: no appendicular ataxia  Sensation: Normal to  soft touch with exception in the BL feet and LEs. (Stocking distribution). Some allodynia reported.  Gait/Station: deferred      Assessment/Plan:    Destiny Burns is a 89 y.o. female with history of neuropathy and HTN presenting to the hospital for aphasia. Outside of time window for tpa. Present in the setting of HTN and otherwise non focal exam. DDX: small stroke/tia, HTN encephalopathy, transient migrainous phenomena of the elderly. If MRI is negative no further work up needed.    Plan:  - Start ASA 81mg daily  - Obtain lipid panel  - MRI  - Advanced care planning as she 89 y/o old and still full code      Abhilash Thrasher M.D., Diplomat of the American Board of Psychiatry and Neurology  Formerly [facility], [facility]  Acute Neurology Consultant

## 2021-07-15 NOTE — ED PROVIDER NOTES
ED Provider Note    Scribed for Stephania Arechiga M.D. by Mendoza Rae. 7/15/2021, 9:39 AM.    Primary care provider: RICHA Castro  Means of arrival: Ambulance  History obtained from: Patient and EMS  History limited by: None    CHIEF COMPLAINT  Chief Complaint   Patient presents with   • Aphasia     onset at noon yesterday, Pt lives in assisted living but did not alert staff until they checked on her this morning & noted pt to be having difficulty getting words out. ELEANOR called - state pt was very difficult to understand at times & speaking some words in Macanese - although A & O x4. Currently, aphasia has subsided except for occasional difficulty with single words. No head trauma or blood thinners. No other neuro deficits. Temp 99.8 at facility per staff. .     HPI  Destiny Burns is a 89 y.o. female who presents to the Emergency Department by ambulance as a Code Stroke IR for evaluation of intermittent, mild expressive aphasia onset approximately 12:00 PM yesterday. She states she hoped her speech would improve, but it would not. EMS reports staff at her assisted living home checked on her this morning, who found the patient with the aphasia, and called EMS. She says her symptoms are improved at this time, but are intermittent. EMS denies any recent head trauma or falls. The patient does not medicate with any blood thinners. She denies acute diplopia or any other stroke-like symptoms. No alleviating factors were reported.     REVIEW OF SYSTEMS  Pertinent positives include expressive aphasia.   Pertinent negatives include no acute diplopia or any other stroke-like symptoms, head injury.   All other systems reviewed and negative.    PAST MEDICAL HISTORY   has a past medical history of Chronic venous insufficiency (5/26/2016), Hypertension (5/26/2016), LLQ abdominal mass (5/26/2016), Palpitations (5/26/2016), and WILLIAN (renal artery stenosis) (HCC) (5/26/2016).    SURGICAL HISTORY  patient denies  "any surgical history    SOCIAL HISTORY  Social History     Tobacco Use   • Smoking status: Never Smoker   • Smokeless tobacco: Never Used   Substance Use Topics   • Alcohol use: No     Alcohol/week: 0.0 oz   • Drug use: No      Social History     Substance and Sexual Activity   Drug Use No       FAMILY HISTORY  Family History   Problem Relation Age of Onset   • Cancer Mother         ORAL   • Cancer Other         AUNT   • Cancer Other         SKIN   • Diabetes Father        CURRENT MEDICATIONS  Home Medications     Reviewed by Linsey M Wegener, R.N. (Registered Nurse) on 07/15/21 at 0944  Med List Status: <None>   Medication Last Dose Status   acetaminophen (TYLENOL) 325 MG Tab  Active   cloNIDine (CATAPRES) 0.1 MG/24HR PATCH WEEKLY patch  Active   diclofenac sodium 1 % Gel  Active   docusate sodium (COLACE) 100 MG Cap  Active   doxazosin (CARDURA) 2 MG Tab  Active   DULoxetine (CYMBALTA) 60 MG Cap DR Particles delayed-release capsule  Active   furosemide (LASIX) 20 MG Tab  Active   hydrALAZINE (APRESOLINE) 50 MG Tab  Active   Multiple Vitamins-Minerals (PRESERVISION AREDS PO)  Active   pantoprazole (PROTONIX) 20 MG tablet  Active   Simethicone (GAS RELIEF 125 MAX ST PO)  Active   spironolactone (ALDACTONE) 25 MG Tab  Active   valsartan (DIOVAN) 320 MG tablet  Active   vitamin D (CHOLECALCIFEROL) 1000 Unit Tab  Active                ALLERGIES  Allergies   Allergen Reactions   • Acyclovir    • Ciprofloxacin    • Citalopram    • Lyrica    • Neurontin [Gabapentin]    • Norvasc [Amlodipine]    • Morphine    • Tramadol      Drowsy, dizzy, depression   • [place] Itching     Itchy throat       PHYSICAL EXAM  VITAL SIGNS: BP (!) 209/102   Pulse 76   Temp 36.7 °C (98 °F) (Temporal)   Resp 18   Ht 1.499 m (4' 11\")   Wt 54.9 kg (121 lb)   SpO2 91%   BMI 24.44 kg/m²   Constitutional: Alert in no apparent distress.  HENT: No signs of trauma, Bilateral external ears normal, Nose normal.   Eyes: Pupils are equal and reactive, " Conjunctiva normal, Non-icteric.   Neck: Normal range of motion, No tenderness, Supple, No stridor.   Cardiovascular: Regular rate and rhythm, no murmurs.   Thorax & Lungs: Normal breath sounds, No respiratory distress, No wheezing, No chest tenderness.   Abdomen: Bowel sounds normal, Soft, No tenderness, No masses, No peritoneal signs.  Skin: Warm, Dry, No erythema, No rash.    Musculoskeletal:  No major deformities noted.   Neurologic: No facial asymmetry. Strength equal in all extremities. No significant aphasia, only intermittent aphasia. Alert, moving all extremities without difficulty.  NIH 0    LABS  Labs Reviewed   CBC WITH DIFFERENTIAL - Abnormal; Notable for the following components:       Result Value    RBC 4.01 (*)     Hematocrit 36.8 (*)     MCHC 32.9 (*)     Platelet Count 160 (*)     Lymphocytes 18.40 (*)     All other components within normal limits    Narrative:     Indicate which anticoagulants the patient is on:->UNKNOWN   COMP METABOLIC PANEL - Abnormal; Notable for the following components:    Sodium 133 (*)     All other components within normal limits    Narrative:     Indicate which anticoagulants the patient is on:->UNKNOWN   TROPONIN - Abnormal; Notable for the following components:    Troponin T 27 (*)     All other components within normal limits    Narrative:     Indicate which anticoagulants the patient is on:->UNKNOWN   PROTHROMBIN TIME    Narrative:     Indicate which anticoagulants the patient is on:->UNKNOWN   APTT    Narrative:     Indicate which anticoagulants the patient is on:->UNKNOWN   COD (ADULT)   ESTIMATED GFR    Narrative:     Indicate which anticoagulants the patient is on:->UNKNOWN   LIPID PROFILE    Narrative:     Fasting   HEMOGLOBIN A1C    Narrative:     Fasting   ABO RH CONFIRM     All labs reviewed by me.    EKG  12 Lead EKG interpreted by me as below    Results for orders placed or performed during the hospital encounter of 07/15/21   EKG (NOW)   Result Value Ref  Range    Report       [facility] Emergency Dept.    Test Date:  2021-07-15  Pt Name:    MOSHE RON                   Department: ER  MRN:        [id number]                      Room:       BL 14  Gender:     Female                       Technician: [id number]  :        [id number]                   Requested By:JOVAN BELL  Order #:    [id number]                    Reading MD: JOVAN BELL    Measurements  Intervals                                Axis  Rate:       67                           P:          34  AZ:         220                          QRS:        -28  QRSD:       104                          T:          26  QT:         396  QTc:        418    Interpretive Statements  SINUS RHYTHM  FIRST DEGREE AV BLOCK  BORDERLINE LEFT AXIS DEVIATION  CONSIDER ANTERIOR INFARCT  Compared to ECG 2019 08:38:59  First degree AV block now present  Myocardial infarct finding now present  Left ventricular hypertrophy no longer present  Impression: No evidence of ischemia.  Electronically Signed  On 7- 12:48:25 PDT by JOVAN BELL       RADIOLOGY  DX-CHEST-PORTABLE (1 VIEW)   Final Result      No acute cardiopulmonary abnormality.      CT-CTA NECK WITH & W/O-POST PROCESSING   Final Result      1.  Moderate calcified plaque at the bilateral carotid bifurcations and bulbs, right greater than left, with less than 50% stenosis by NASCET criteria.   2.  Limited evaluation of the mid cervical ICAs and the mid to distal vertebral arteries due to artifact from patient's extensive spinal fusion hardware as well as dental hardware.      CT-CTA HEAD WITH & W/O-POST PROCESS   Final Result      No occlusion, hemodynamically significant stenosis or aneurysm.      CT-CEREBRAL PERFUSION ANALYSIS   Final Result      1.  Cerebral blood flow less than 30% likely representing completed infarct = 0 mL.      2.  T Max more than 6 seconds likely representing combination of completed infarct and ischemia = 0 mL.      3.   Mismatched volume likely representing ischemic brain/penumbra = None      4.  Please note that the cerebral perfusion was performed on the limited brain tissue around the basal ganglia region. Infarct/ischemia outside the CT perfusion sections can be missed in this study.      CT-HEAD W/O   Final Result      1.  Cerebral atrophy.      2.  Otherwise, Head CT without contrast within normal limits. No evidence of acute hemorrhage or mass effect.      EC-ECHOCARDIOGRAM COMPLETE W/O CONT    (Results Pending)   MR-BRAIN-W/O    (Results Pending)     The radiologist's interpretation of all radiological studies have been reviewed by me.    COURSE & MEDICAL DECISION MAKING  Pertinent Labs & Imaging studies reviewed. (See chart for details)    Differential diagnoses include but are not limited to: Stroke, Complex Migraine,     9:39 AM - Patient seen and examined at bedside. I informed the patient of my plan to run diagnostic studies to evaluate their symptoms including imaging and labs. Patient verbalizes understanding and support with my plan of care. Ordered DX-Chest, CT-Head w/o, CT-Cerebral Perfusion Analysis, CT-CTA Head with and w/o, CT-CTA Neck with and w/o, EKG, CBC with diff, CMP, INR, APTT, COD, Troponin to evaluate her symptoms.     10:38 AM - Nursing staff informed me the patient is still hypertensive and is complaining of a headache. The patient reports he is complaint with her prescribed medications, but paperwork from her assisted living home does not indicate she received them this morning. She will be medicated with valsartan 320 mg tab and hydralazine 50 mg tab.     11:06 AM I discussed the patient's case and the above findings with Dr. Thrasher (Neurology) who will consult on the patient.      12:40 PM - I spoke to Dr. Thrasher (Neurology), who evaluated the patient at bedside and recommended admitting the patient overnight for observation.       Decision Making:  This is a 89 y.o. year old female who presents with  mild aphasia since yesterday.  Patient presented and was out of an alteplase window.  Her NIH stroke scale was very low.  CT scan and CTA did not show any obvious large vessel occlusion.  Patient was evaluated by the neurology team.  Given she is still having some mild aphasia recommendation was made for hospitalization for work-up.  Therefore patient will be admitted to the CDU for stroke work-up.    DISPOSITION:  Patient will be hospitalized by Dr. Alanis in stable condition.     FINAL IMPRESSION  1. TIA (transient ischemic attack)         This dictation has been created using voice recognition software and/or scribes. The accuracy of the dictation is limited by the abilities of the software and the expertise of the scribes. I expect there may be some errors of grammar and possibly content. I made every attempt to manually correct the errors within my dictation. However, errors related to voice recognition software and/or scribes may still exist and should be interpreted within the appropriate context.     Mendoza RIVERA (Scribe), am scribing for, and in the presence of, Stephania Arechiga M.D..    Electronically signed by: Mendoza Rae (Scribe), 7/15/2021    IStephania M.D. personally performed the services described in this documentation, as scribed by Mendoza Rae in my presence, and it is both accurate and complete.    The note accurately reflects work and decisions made by me.  Stephania Arechiga M.D.  7/15/2021  2:02 PM

## 2021-07-16 ENCOUNTER — APPOINTMENT (OUTPATIENT)
Dept: CARDIOLOGY | Facility: MEDICAL CENTER | Age: 86
End: 2021-07-16
Attending: NURSE PRACTITIONER
Payer: MEDICARE

## 2021-07-16 ENCOUNTER — PHARMACY VISIT (OUTPATIENT)
Dept: PHARMACY | Facility: MEDICAL CENTER | Age: 86
End: 2021-07-16
Payer: COMMERCIAL

## 2021-07-16 ENCOUNTER — PATIENT OUTREACH (OUTPATIENT)
Dept: HEALTH INFORMATION MANAGEMENT | Facility: OTHER | Age: 86
End: 2021-07-16

## 2021-07-16 VITALS
HEART RATE: 72 BPM | SYSTOLIC BLOOD PRESSURE: 167 MMHG | OXYGEN SATURATION: 92 % | HEIGHT: 59 IN | DIASTOLIC BLOOD PRESSURE: 73 MMHG | RESPIRATION RATE: 17 BRPM | TEMPERATURE: 98 F | WEIGHT: 121.91 LBS | BODY MASS INDEX: 24.58 KG/M2

## 2021-07-16 PROBLEM — R29.90 STROKE-LIKE SYMPTOMS: Status: RESOLVED | Noted: 2021-07-15 | Resolved: 2021-07-16

## 2021-07-16 LAB
LV EJECT FRACT  99904: 70
LV EJECT FRACT MOD 2C 99903: 69.8
LV EJECT FRACT MOD 4C 99902: 78.13
LV EJECT FRACT MOD BP 99901: 74.36

## 2021-07-16 PROCEDURE — RXMED WILLOW AMBULATORY MEDICATION CHARGE: Performed by: NURSE PRACTITIONER

## 2021-07-16 PROCEDURE — 93306 TTE W/DOPPLER COMPLETE: CPT

## 2021-07-16 PROCEDURE — A9270 NON-COVERED ITEM OR SERVICE: HCPCS | Performed by: NURSE PRACTITIONER

## 2021-07-16 PROCEDURE — G0378 HOSPITAL OBSERVATION PER HR: HCPCS

## 2021-07-16 PROCEDURE — 700102 HCHG RX REV CODE 250 W/ 637 OVERRIDE(OP): Performed by: NURSE PRACTITIONER

## 2021-07-16 PROCEDURE — 99217 PR OBSERVATION CARE DISCHARGE: CPT | Performed by: INTERNAL MEDICINE

## 2021-07-16 PROCEDURE — 93306 TTE W/DOPPLER COMPLETE: CPT | Mod: 26 | Performed by: INTERNAL MEDICINE

## 2021-07-16 RX ORDER — ATORVASTATIN CALCIUM 40 MG/1
40 TABLET, FILM COATED ORAL EVERY EVENING
Qty: 30 TABLET | Refills: 0 | Status: SHIPPED | OUTPATIENT
Start: 2021-07-16 | End: 2021-07-28

## 2021-07-16 RX ADMIN — VALSARTAN 320 MG: 80 TABLET, FILM COATED ORAL at 06:45

## 2021-07-16 RX ADMIN — Medication 1000 UNITS: at 06:44

## 2021-07-16 RX ADMIN — ASPIRIN 325 MG ORAL TABLET 325 MG: 325 PILL ORAL at 06:44

## 2021-07-16 RX ADMIN — DULOXETINE HYDROCHLORIDE 60 MG: 60 CAPSULE, DELAYED RELEASE ORAL at 06:44

## 2021-07-16 ASSESSMENT — PAIN DESCRIPTION - PAIN TYPE: TYPE: ACUTE PAIN

## 2021-07-16 NOTE — PROGRESS NOTES
Assessment completed. Pt A&Ox4. Respirations are even and unlabored on RA. Pt reports 5/10 right arm pain at this time-declines medciation. Monitors applied, hypertensive (medication per MAR), call light and belongings within reach. POC updated (echo, PT/OT, neuro checks). Pt educated on room and call light, pt verbalized understanding. Communication board updated. Needs met.

## 2021-07-16 NOTE — THERAPY
SLP Contact Note    Per RN, clinical swallow and cognitive-linguistic evaluations are not indicated. SLP will complete the orders. Please reorder with any new concerns.

## 2021-07-16 NOTE — DISCHARGE PLANNING
Meds-to-Beds: Discharge prescription orders listed below delivered to patient's bedside. RN Vangie notified. Patient counseled.  Patient elected to have co-payment billed to patient account.      Destiny Burns   Home Medication Instructions OPAL:[id number]    Printed on:07/16/21 0139   Medication Information                      aspirin EC (ECOTRIN) 81 MG Tablet Delayed Response  Take 1 tablet by mouth every day.             atorvastatin (LIPITOR) 40 MG Tab  Take 1 tablet by mouth every evening.                 Chanell Massey, PharmD

## 2021-07-16 NOTE — CARE PLAN
Problem: Self Care  Goal: Patient will have the ability to perform ADLs independently or with assistance (bathe, groom, dress, toilet and feed)  Outcome: Progressing     Problem: Knowledge Deficit - Standard  Goal: Patient and family/care givers will demonstrate understanding of plan of care, disease process/condition, diagnostic tests and medications  Outcome: Progressing   The patient is Stable - Low risk of patient condition declining or worsening         Progress made toward(s) clinical / shift goals:  Pt updated on POC and imaging ordered, all questions answered. Pt denies any needs at this time. Will continue to monitor.    Patient is not progressing towards the following goals:

## 2021-07-16 NOTE — PROGRESS NOTES
4 Eyes Skin Assessment Completed by Senia, RN and Sanket RN.    Head WDL  Ears WDL  Nose WDL  Mouth WDL  Neck WDL  Breast/Chest Redness  Shoulder Blades WDL  Spine Redness  (R) Arm/Elbow/Hand WDL  (L) Arm/Elbow/Hand Swelling  Abdomen WDL  Groin WDL  Scrotum/Coccyx/Buttocks WDL  (R) Leg WDL  (L) Leg WDL  (R) Heel/Foot/Toe WDL  (L) Heel/Foot/Toe WDL          Devices In Places Tele Box and Pulse Ox      Interventions In Place InterDry and Pillows    Possible Skin Injury No    Pictures Uploaded Into Epic N/A  Wound Consult Placed N/A  RN Wound Prevention Protocol Ordered No 2

## 2021-07-16 NOTE — THERAPY
Missed Therapy     Patient Name: Destiny Burns  Age:  89 y.o., Sex:  female  Medical Record #: [id number]  Today's Date: 7/16/2021    Discussed missed therapy with RN        07/16/21 0986   Initial Contact Note    Initial Contact Note Order Received and Verified. Occupational Therapy Evaluation NOT Completed Because Patient Does Not Require Acute Occupational Therapy at this Time.   Interdisciplinary Plan of Care Collaboration   Collaboration Comments OT order received per RN all symptoms have cleared MRI - from [facility] OT eval not currently indicated   Session Information   Date / Session Number  7/16 Eval not indicated    Priority 0

## 2021-07-16 NOTE — PROGRESS NOTES
Discharge paper work provided to patient and reviewed. Patient IV taken out and patient was dressed and escorted out via wheelchair by CNA. Patient is discharged.

## 2021-07-16 NOTE — PROGRESS NOTES
Reviewed MRI no acute infarct. Some old lacunes that would not cause aphasia. Aphasia improved this am. Possible TIA. Would start ASA 81 mg daily. Continue statin.

## 2021-07-16 NOTE — DISCHARGE SUMMARY
Discharge Summary    CHIEF COMPLAINT ON ADMISSION  Chief Complaint   Patient presents with   • Aphasia     onset at noon yesterday, Pt lives in assisted living but did not alert staff until they checked on her this morning & noted pt to be having difficulty getting words out. [facility] called - state pt was very difficult to understand at times & speaking some words in [place] - although A & O x4. Currently, aphasia has subsided except for occasional difficulty with single words. No head trauma or blood thinners. No other neuro deficits. Temp 99.8 at facility per staff. .       Reason for Admission  EMS     Admission Date  7/15/2021    CODE STATUS  Full Code    HPI & HOSPITAL COURSE  This is a 89 y.o. female here with aphasia. She was admitted for stroke work-up and close monitoring. BP noted to be 209/102 on admission and improved with resumption of her home antihypertensives. MRI brain did not show acute infarct. Labs unimpressive. No focal deficits on exam. Aphasia resolved the following morning. SLP cleared her for regular diet and she was ambulating independently. For these reasons, she was cleared for discharge home with follow-up at the stroke [facility] outpatient. She was instructed to start daily ASA and statin upon discharge.     Therefore, she is discharged in fair and stable condition to home with close outpatient follow-up.    The patient recovered much more quickly than anticipated on admission.    Discharge Date  7/16/2021    FOLLOW UP ITEMS POST DISCHARGE  N/A    DISCHARGE DIAGNOSES  Principal Problem (Resolved):    Stroke-like symptoms POA: Yes  Active Problems:    Dyslipidemia POA: Yes    Essential hypertension POA: Yes    Thrombocytopenia (HCC) POA: Yes      FOLLOW UP  No future appointments.  No follow-up provider specified.    MEDICATIONS ON DISCHARGE     Medication List      START taking these medications      Instructions   aspirin EC 81 MG Tbec  Commonly known as: ECOTRIN   Take 1  tablet by mouth every day.  Dose: 81 mg     atorvastatin 40 MG Tabs  Commonly known as: LIPITOR   Take 1 tablet by mouth every evening.  Dose: 40 mg        CHANGE how you take these medications      Instructions   cloNIDine 0.1 MG/24HR Ptwk patch  What changed: See the new instructions.  Commonly known as: CATAPRES   APPLY 1 PATCH TOPICALLY EVERY SEVEN DAYS , REMOVE OLD PATCH BEFORE APPLYING NEW ONE     pantoprazole 20 MG tablet  What changed:   · how much to take  · how to take this  · when to take this  · additional instructions  Commonly known as: PROTONIX   TAKE 1 TABLET BY MOUTH TWICE A DAY        CONTINUE taking these medications      Instructions   acetaminophen 325 MG Tabs  Commonly known as: Tylenol   Take 650 mg by mouth 3 times a day.  Dose: 650 mg     diclofenac sodium 1 % Gel   Apply 2 g topically 3 times a day.  Dose: 2 g     docusate sodium 100 MG Caps  Commonly known as: COLACE   Take 100 mg by mouth every morning.  Dose: 100 mg     doxazosin 2 MG Tabs  Commonly known as: CARDURA   Take 2 mg by mouth at bedtime.  Dose: 2 mg     DULoxetine 60 MG Cpep delayed-release capsule  Commonly known as: CYMBALTA   Take 60 mg by mouth every morning.  Dose: 60 mg     furosemide 20 MG Tabs  Commonly known as: LASIX   Take 20 mg by mouth every 48 hours.  Dose: 20 mg     GAS RELIEF 125 MAX ST PO   Take 1 capsule by mouth every morning.  Dose: 1 capsule     hydrALAZINE 50 MG Tabs  Commonly known as: APRESOLINE   Take 1 tablet by mouth 4 times a day.  Dose: 50 mg     PRESERVISION AREDS PO   Take 1 tablet by mouth 2 times a day.  Dose: 1 tablet     spironolactone 25 MG Tabs  Commonly known as: ALDACTONE   Take 25 mg by mouth every 48 hours.  Dose: 25 mg     valsartan 320 MG tablet  Commonly known as: DIOVAN   Take 1 Tab by mouth every morning.  Dose: 320 mg     vitamin D 1000 Unit Tabs  Commonly known as: Cholecalciferol   Take 1,000 Units by mouth every day.  Dose: 1,000 Units            Allergies  Allergies    Allergen Reactions   • Acyclovir    • Ciprofloxacin    • Citalopram    • Lyrica    • Neurontin [Gabapentin]    • Norvasc [Amlodipine]    • Morphine    • Tramadol      Drowsy, dizzy, depression   • [place] Itching     Itchy throat       DIET  Orders Placed This Encounter   Procedures   • Diet Order Diet: Regular     Standing Status:   Standing     Number of Occurrences:   1     Order Specific Question:   Diet:     Answer:   Regular [1]       ACTIVITY  As tolerated.  Weight bearing as tolerated    CONSULTATIONS  Neurology, Dr. Thrasher     PROCEDURES  N/A    LABORATORY  Lab Results   Component Value Date    SODIUM 133 (L) 07/15/2021    POTASSIUM 4.4 07/15/2021    CHLORIDE 102 07/15/2021    CO2 21 07/15/2021    GLUCOSE 96 07/15/2021    BUN 18 07/15/2021    CREATININE 0.75 07/15/2021    CREATININE 1.0 03/11/2009        Lab Results   Component Value Date    WBC 7.2 07/15/2021    HEMOGLOBIN 12.1 07/15/2021    HEMATOCRIT 36.8 (L) 07/15/2021    PLATELETCT 160 (L) 07/15/2021

## 2021-07-16 NOTE — PROGRESS NOTES
Report received and patient was assessed. See assessment details in flow sheet. Patient was ambulated around the unit with stand by assistance by this RN. Patient tolerated activity without incident. Gait is steady. Patient notes she uses a cane when walking at home but does not have it with her at this time. Patient is now resting in bed. Bed is in the lowest position and locked. Call light is within reach.

## 2021-07-16 NOTE — CARE PLAN
The patient is Stable - Low risk of patient condition declining or worsening    Shift Goals  Clinical Goals: Safety   Patient Goals: discharge  Family Goals: discharge    Progress made toward(s) clinical / shift goals:      Problem: Pain - Standard  Goal: Alleviation of pain or a reduction in pain to the patient’s comfort goal  Outcome: Met     Problem: Optimal Care of the Stroke Patient  Goal: Optimal emergency care for the stroke patient  Outcome: Met  Goal: Optimal acute care for the stroke patient  Outcome: Met     Problem: Knowledge Deficit - Stroke Education  Goal: Patient's knowledge of stroke and risk factors will improve  Outcome: Met     Problem: Psychosocial - Patient Condition  Goal: Patient's ability to verbalize feelings about condition will improve  Outcome: Met  Goal: Patient's ability to re-evaluate and adapt role responsibilities will improve  Outcome: Met     Problem: Discharge Planning - Stroke  Goal: Ensure Stroke Core Measures are met prior to discharge  Outcome: Met  Goal: Patient’s continuum of care needs will be met  Outcome: Met     Problem: Neuro Status  Goal: Neuro status will remain stable or improve  Outcome: Met     Problem: Hemodynamic Monitoring  Goal: Patient's hemodynamics, fluid balance and neurologic status will be stable or improve  Outcome: Met     Problem: Respiratory - Stroke Patient  Goal: Patient will achieve/maintain optimum respiratory rate/effort  Outcome: Met     Problem: Dysphagia  Goal: Dysphagia will improve  Outcome: Met     Problem: Risk for Aspiration  Goal: Patient's risk for aspiration will be absent or decrease  Outcome: Met     Problem: Urinary Elimination  Goal: Establish and maintain regular urinary output  Outcome: Met     Problem: Bowel Elimination  Goal: Establish and maintain regular bowel function  Outcome: Met     Problem: Mobility - Stroke  Goal: Patient's capacity to carry out activities will improve  Outcome: Met  Goal: Spasticity will be prevented  or improved  Outcome: Met  Goal: Subluxation will be prevented or improved  Outcome: Met     Problem: Self Care  Goal: Patient will have the ability to perform ADLs independently or with assistance (bathe, groom, dress, toilet and feed)  Outcome: Met     Problem: Knowledge Deficit - Standard  Goal: Patient and family/care givers will demonstrate understanding of plan of care, disease process/condition, diagnostic tests and medications  Outcome: Met     Problem: Skin Integrity  Goal: Skin integrity is maintained or improved  Outcome: Met     Problem: Fall Risk  Goal: Patient will remain free from falls  Outcome: Met          Patient is not progressing towards the following goals:

## 2021-07-28 PROBLEM — F32.A MILD DEPRESSION: Status: ACTIVE | Noted: 2020-10-21

## 2021-07-28 PROBLEM — R32 URINARY INCONTINENCE IN FEMALE: Status: ACTIVE | Noted: 2021-07-28

## 2021-07-31 ENCOUNTER — HOSPITAL ENCOUNTER (EMERGENCY)
Facility: MEDICAL CENTER | Age: 86
End: 2021-07-31
Attending: EMERGENCY MEDICINE
Payer: MEDICARE

## 2021-07-31 ENCOUNTER — APPOINTMENT (OUTPATIENT)
Dept: RADIOLOGY | Facility: MEDICAL CENTER | Age: 86
End: 2021-07-31
Attending: EMERGENCY MEDICINE
Payer: MEDICARE

## 2021-07-31 VITALS
BODY MASS INDEX: 24.39 KG/M2 | DIASTOLIC BLOOD PRESSURE: 90 MMHG | TEMPERATURE: 97.7 F | RESPIRATION RATE: 16 BRPM | SYSTOLIC BLOOD PRESSURE: 192 MMHG | WEIGHT: 121 LBS | OXYGEN SATURATION: 96 % | HEART RATE: 75 BPM | HEIGHT: 59 IN

## 2021-07-31 DIAGNOSIS — S09.8XXA BLUNT HEAD TRAUMA, INITIAL ENCOUNTER: ICD-10-CM
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
## 2021-07-31 DIAGNOSIS — S16.1XXA STRAIN OF NECK MUSCLE, INITIAL ENCOUNTER: ICD-10-CM

## 2021-07-31 PROCEDURE — 700102 HCHG RX REV CODE 250 W/ 637 OVERRIDE(OP): Performed by: EMERGENCY MEDICINE

## 2021-07-31 PROCEDURE — 72125 CT NECK SPINE W/O DYE: CPT | Mod: MF

## 2021-07-31 PROCEDURE — A9270 NON-COVERED ITEM OR SERVICE: HCPCS | Performed by: EMERGENCY MEDICINE

## 2021-07-31 PROCEDURE — 99284 EMERGENCY DEPT VISIT MOD MDM: CPT

## 2021-07-31 PROCEDURE — 70450 CT HEAD/BRAIN W/O DYE: CPT | Mod: ME

## 2021-07-31 RX ORDER — ACETAMINOPHEN 325 MG/1
650 TABLET ORAL ONCE
Status: COMPLETED | OUTPATIENT
Start: 2021-07-31 | End: 2021-07-31

## 2021-07-31 RX ADMIN — ACETAMINOPHEN 650 MG: 325 TABLET, FILM COATED ORAL at 07:48

## 2021-07-31 ASSESSMENT — FIBROSIS 4 INDEX: FIB4 SCORE: 4.125

## 2021-07-31 NOTE — ED NOTES
Alerted [place] of pt's discharge instructions.  All lines and monitors DC'd.  Discharge instructions given, questions answered.  Wheeled out of ER, escorted by  RN .  Pt states all belongings in possession.  Instructed not to drive after pain medication and pt verbalizes understanding.  RX x0 given.

## 2021-07-31 NOTE — ED TRIAGE NOTES
"Chief Complaint   Patient presents with   • GLF     BIB [facility], pt fell around 0500 this morning. Hit her head. +blood thinners, GCS 15     BP (!) 218/102   Pulse 65   Temp 36.5 °C (97.7 °F) (Temporal)   Resp 17   Ht 1.499 m (4' 11\")   Wt 54.9 kg (121 lb)   SpO2 94%   BMI 24.44 kg/m²     "

## 2021-07-31 NOTE — ED PROVIDER NOTES
ED Provider Note    Scribed for Franklin Kerns M.D. by Chaka Ferreira. 7/31/2021  6:22 AM    Primary care provider: RICHA Castro  Means of arrival: EMS  History obtained from: Patient and Nurse  History limited by: None    CHIEF COMPLAINT  Chief Complaint   Patient presents with    GLF     BIB [facility], pt fell around 0500 this morning. Hit her head. +blood thinners, GCS 15     HPI  Destiny Burns is a 89 y.o. female who presents to the Emergency Department for a TBI onset prior to arrival. Per Nurse, the patient was getting up to go the bathroom and fell and hit her forehead. She has associated symptoms of neck pain but denies any arm pain, back pain, chest pain, or leg pain. No alleviating or exacerbating factors reported. She is currently on blood thinners. EMS notes her GCS was 15.    REVIEW OF SYSTEMS  Pertinent positives include ground level fall and neck pain.   Pertinent negatives include no arm pain, back pain, chest pain, or leg pain.    All other systems reviewed and negative. See HPI for further details.     PAST MEDICAL HISTORY   has a past medical history of Chronic venous insufficiency (5/26/2016), Hypertension (5/26/2016), LLQ abdominal mass (5/26/2016), Palpitations (5/26/2016), and WILLIAN (renal artery stenosis) (HCC) (5/26/2016).    SURGICAL HISTORY  patient denies any surgical history    SOCIAL HISTORY  Social History     Tobacco Use    Smoking status: Never Smoker    Smokeless tobacco: Never Used   Substance Use Topics    Alcohol use: No     Alcohol/week: 0.0 oz    Drug use: No      Social History     Substance and Sexual Activity   Drug Use No       FAMILY HISTORY  Family History   Problem Relation Age of Onset    Cancer Mother         ORAL    Cancer Other         AUNT    Cancer Other         SKIN    Diabetes Father        CURRENT MEDICATIONS  Current Outpatient Medications   Medication Instructions    acetaminophen (TYLENOL) 650 mg, Oral, 3 TIMES DAILY    Adult Aspirin Regimen 81 mg,  "Oral, DAILY    cloNIDine (CATAPRES) 0.1 MG/24HR PATCH WEEKLY patch APPLY 1 PATCH TOPICALLY EVERY SEVEN DAYS , REMOVE OLD PATCH BEFORE APPLYING NEW ONE    diclofenac sodium 2 g, Apply externally, 3 TIMES DAILY    docusate sodium (COLACE) 100 mg, Oral, EVERY MORNING    doxazosin (CARDURA) 2 mg, Oral, EVERY BEDTIME    DULoxetine (CYMBALTA) 60 mg, Oral, EVERY MORNING    furosemide (LASIX) 20 mg, Oral, EVERY 48 HOURS    hydrALAZINE (APRESOLINE) 50 mg, Oral, 3 TIMES DAILY    Multiple Vitamins-Minerals (PRESERVISION AREDS PO) 1 tablet, Oral, 2 TIMES DAILY    pantoprazole (PROTONIX) 20 MG tablet TAKE 1 TABLET BY MOUTH TWICE A DAY    Simethicone (GAS RELIEF 125 MAX ST PO) 1 capsule, Oral, EVERY MORNING    spironolactone (ALDACTONE) 25 mg, Oral, EVERY 48 HOURS    valsartan (DIOVAN) 320 mg, Oral, EVERY MORNING    vitamin D (CHOLECALCIFEROL) 1,000 Units, Oral, DAILY       ALLERGIES  Allergies   Allergen Reactions    Acyclovir     Ciprofloxacin     Citalopram     Lyrica     Neurontin [Gabapentin]     Morphine     Tramadol      Drowsy, dizzy, depression    [place] Itching     Itchy throat    Norvasc [Amlodipine]        PHYSICAL EXAM  VITAL SIGNS: BP (!) 218/102   Pulse 65   Temp 36.5 °C (97.7 °F) (Temporal)   Resp 17   Ht 1.499 m (4' 11\")   Wt 54.9 kg (121 lb)   SpO2 94%   BMI 24.44 kg/m²     Nursing note and vitals reviewed.  Constitutional: Well-developed and well-nourished. No distress.   HENT: Tenderness of right parietal scalp. Oropharynx is clear and moist without exudate or erythema.   Eyes: Pupils are equal, round, and reactive to light. Conjunctiva are normal.   Cardiovascular: Normal rate and regular rhythm. No murmur heard. Normal radial pulses.  Pulmonary/Chest: Breath sounds normal. No wheezes or rales.   Abdominal: Soft and non-tender. No distention    Musculoskeletal: Extremities exhibit normal range of motion without edema. Diffuse C-Spine tenderness.  Neurological: Awake, alert and oriented to person, " place, and time. No focal deficits noted.  Skin: Skin is warm and dry. No rash.   Psychiatric: Normal mood and affect. Appropriate for clinical situation.    DIAGNOSTIC STUDIES / PROCEDURES    RADIOLOGY  CT-HEAD W/O   Final Result      No noncontrast CT evidence of acute intracranial hemorrhage.      Age-appropriate atrophy      Mild white matter hypodensity is present.  This is a nonspecific finding which usually is found to represent chronic microvascular disease in patient's of this demographic.  Demyelination, age indeterminant ischemia and gliosis are also common    possibilities.         CT-CSPINE WITHOUT PLUS RECONS   Final Result      No CT evidence of acute cervical spine abnormality.      C2-T3 posterior and anterior fixation, laminectomies        The radiologist's interpretation of all radiological studies have been reviewed by me.    COURSE & MEDICAL DECISION MAKING  Nursing notes, VS, PMSFHx reviewed in chart.     6:22 AM - Patient seen and examined at the charge desk. Ordered CT-CSpine w/out and CT-Head w/out to evaluate her symptoms. Given her advanced age, a CT Scan will be obtained.    7:30 AM - Patient will be treated with Tylenol 650 mg PO. Discussed plans for discharge and precautions. Patient understands and verbalizes agreement to the plan of discharge.    CT scan demonstrates no evidence of intracranial hemorrhage, skull fracture, or C-spine injury.  Treated with Tylenol for pain.    The patient will return for new or worsening symptoms and is stable at the time of discharge.    The patient is referred to a primary physician for blood pressure management, diabetic screening, and for all other preventative health concerns.    DISPOSITION:  Patient will be discharged home in stable condition.    FOLLOW UP:  Daria Connelly A.P.R.NLaisha  [address] [facility] [address]  [phone]    Schedule an appointment as soon as possible for a visit       [facility], Emergency  Dept  [address] [place] [id number] [phone]    If symptoms worsen    FINAL IMPRESSION  1. Fall, initial encounter    2. Blunt head trauma, initial encounter    3. Strain of neck muscle, initial encounter        IChaka (Scribe), am scribing for, and in the presence of, Franklin Kerns M.D..    Electronically signed by: Chaka Ferreira (Scribe), 7/31/2021    IFranklin M.D. personally performed the services described in this documentation, as scribed by Chaka Ferreira in my presence, and it is both accurate and complete.    The note accurately reflects work and decisions made by me.  Franklin Kerns M.D.  7/31/2021  11:58 AM

## 2021-07-31 NOTE — ED NOTES
Pt ambulated 50 ft with RN and able to go to toilet with minimal assist.      Safety directives and medication teaching provided when administering tylenol.

## 2021-08-10 ENCOUNTER — OFFICE VISIT (OUTPATIENT)
Dept: NEUROLOGY | Facility: MEDICAL CENTER | Age: 86
End: 2021-08-10
Attending: NURSE PRACTITIONER
Payer: MEDICARE

## 2021-08-10 VITALS
TEMPERATURE: 97.8 F | HEIGHT: 59 IN | HEART RATE: 82 BPM | BODY MASS INDEX: 24.44 KG/M2 | OXYGEN SATURATION: 95 % | SYSTOLIC BLOOD PRESSURE: 140 MMHG | WEIGHT: 121.25 LBS | DIASTOLIC BLOOD PRESSURE: 92 MMHG

## 2021-08-10 DIAGNOSIS — R29.818 TRANSIENT NEUROLOGIC DEFICIT: ICD-10-CM

## 2021-08-10 DIAGNOSIS — R73.03 PREDIABETES: ICD-10-CM

## 2021-08-10 DIAGNOSIS — E78.2 MIXED HYPERLIPIDEMIA: ICD-10-CM

## 2021-08-10 DIAGNOSIS — I10 ESSENTIAL HYPERTENSION: ICD-10-CM

## 2021-08-10 PROCEDURE — 99215 OFFICE O/P EST HI 40 MIN: CPT | Performed by: NURSE PRACTITIONER

## 2021-08-10 PROCEDURE — 99212 OFFICE O/P EST SF 10 MIN: CPT | Performed by: NURSE PRACTITIONER

## 2021-08-10 RX ORDER — ROSUVASTATIN CALCIUM 20 MG/1
20 TABLET, COATED ORAL EVERY EVENING
Qty: 30 TABLET | Refills: 11 | Status: SHIPPED | OUTPATIENT
Start: 2021-08-10 | End: 2021-08-10

## 2021-08-10 RX ORDER — ROSUVASTATIN CALCIUM 20 MG/1
20 TABLET, COATED ORAL EVERY EVENING
Qty: 30 TABLET | Refills: 11 | Status: SHIPPED | OUTPATIENT
Start: 2021-08-10 | End: 2022-02-21

## 2021-08-10 ASSESSMENT — ENCOUNTER SYMPTOMS
VOMITING: 0
FALLS: 1
HEARTBURN: 0
NERVOUS/ANXIOUS: 1
HEADACHES: 1
BRUISES/BLEEDS EASILY: 0
COUGH: 1
SHORTNESS OF BREATH: 1
BLURRED VISION: 1
FEVER: 0
NECK PAIN: 1
PALPITATIONS: 0
DEPRESSION: 0
NAUSEA: 0
DIZZINESS: 1

## 2021-08-10 ASSESSMENT — FIBROSIS 4 INDEX: FIB4 SCORE: 4.125

## 2021-08-10 NOTE — PATIENT INSTRUCTIONS
If any new signs of stroke:  sudden weakness, numbness, speech difficulty (slurring or difficulty finding words), imbalance, incoordination, worse headache of life or vision loss occur, call 911.      Take all medications as prescribed unless instructed by your provider.            Stroke Prevention  Some medical conditions and lifestyle choices can lead to a higher risk for a stroke. You can help to prevent a stroke by making nutrition, lifestyle, and other changes.  What nutrition changes can be made?    · Eat healthy foods.  ? Choose foods that are high in fiber. These include:  § Fresh fruits.  § Fresh vegetables.  § Whole grains.  ? Eat at least 5 or more servings of fruits and vegetables each day. Try to fill half of your plate at each meal with fruits and vegetables.  ? Choose lean protein foods. These include:  § Lowfat (lean) cuts of meat.  § Chicken without skin.  § Fish.  § Tofu.  § Beans.  § Nuts.  ? Eat low-fat dairy products.  ? Avoid foods that:  § Are high in salt (sodium).  § Have saturated fat.  § Have trans fat.  § Have cholesterol.  § Are processed.  § Are premade.  · Follow eating guidelines as told by your doctor. These may include:  ? Reducing how many calories you eat and drink each day.  ? Limiting how much salt you eat or drink each day to 1,500 milligrams (mg).  ? Using only healthy fats for cooking. These include:  § Olive oil.  § Canola oil.  § Sunflower oil.  ? Counting how many carbohydrates you eat and drink each day.  What lifestyle changes can be made?  · Try to stay at a healthy weight. Talk to your doctor about what a good weight is for you.  · Get at least 30 minutes of moderate physical activity at least 5 days a week. This can include:  ? Fast walking.  ? Biking.  ? Swimming.  · Do not use any products that have nicotine or tobacco. This includes cigarettes and e-cigarettes. If you need help quitting, ask your doctor. Avoid being around tobacco smoke in general.  · Limit how  "much alcohol you drink to no more than 1 drink a day for nonpregnant women and 2 drinks a day for men. One drink equals 12 oz of beer, 5 oz of wine, or 1½ oz of hard liquor.  · Do not use drugs.  · Avoid taking birth control pills. Talk to your doctor about the risks of taking birth control pills if:  ? You are over 35 years old.  ? You smoke.  ? You get migraines.  ? You have had a blood clot.  What other changes can be made?  · Manage your cholesterol.  ? It is important to eat a healthy diet.  ? If your cholesterol cannot be managed through your diet, you may also need to take medicines. Take medicines as told by your doctor.  · Manage your diabetes.  ? It is important to eat a healthy diet and to exercise regularly.  ? If your blood sugar cannot be managed through diet and exercise, you may need to take medicines. Take medicines as told by your doctor.  · Control your high blood pressure (hypertension).  ? Try to keep your blood pressure below 130/80. This can help lower your risk of stroke.  ? It is important to eat a healthy diet and to exercise regularly.  ? If your blood pressure cannot be managed through diet and exercise, you may need to take medicines. Take medicines as told by your doctor.  ? Ask your doctor if you should check your blood pressure at home.  ? Have your blood pressure checked every year. Do this even if your blood pressure is normal.  · Talk to your doctor about getting checked for a sleep disorder. Signs of this can include:  ? Snoring a lot.  ? Feeling very tired.  · Take over-the-counter and prescription medicines only as told by your doctor. These may include aspirin or blood thinners (antiplatelets or anticoagulants).  · Make sure that any other medical conditions you have are managed.  Where to find more information  · American Stroke Association: www.strokeassociation.org  · National Stroke Association: www.stroke.org  Get help right away if:  · You have any symptoms of stroke. \"BE " "FAST\" is an easy way to remember the main warning signs:  ? B - Balance. Signs are dizziness, sudden trouble walking, or loss of balance.  ? E - Eyes. Signs are trouble seeing or a sudden change in how you see.  ? F - Face. Signs are sudden weakness or loss of feeling of the face, or the face or eyelid drooping on one side.  ? A - Arms. Signs are weakness or loss of feeling in an arm. This happens suddenly and usually on one side of the body.  ? S - Speech. Signs are sudden trouble speaking, slurred speech, or trouble understanding what people say.  ? T - Time. Time to call emergency services. Write down what time symptoms started.  · You have other signs of stroke, such as:  ? A sudden, very bad headache with no known cause.  ? Feeling sick to your stomach (nausea).  ? Throwing up (vomiting).  ? Jerky movements you cannot control (seizure).  These symptoms may represent a serious problem that is an emergency. Do not wait to see if the symptoms will go away. Get medical help right away. Call your local emergency services (911 in the [place] Do not drive yourself to the hospital.  Summary  · You can prevent a stroke by eating healthy, exercising, not smoking, drinking less alcohol, and treating other health problems, such as diabetes, high blood pressure, or high cholesterol.  · Do not use any products that contain nicotine or tobacco, such as cigarettes and e-cigarettes.  · Get help right away if you have any signs or symptoms of a stroke.  This information is not intended to replace advice given to you by your health care provider. Make sure you discuss any questions you have with your health care provider.  Document Released: 06/18/2013 Document Revised: 02/13/2020 Document Reviewed: 03/21/2018  Elsevier Patient Education © 2020 [employer]  Hypertension, Adult  Hypertension is another name for high blood pressure. High blood pressure forces your heart to work harder to pump blood. This can cause problems over " time.  There are two numbers in a blood pressure reading. There is a top number (systolic) over a bottom number (diastolic). It is best to have a blood pressure that is below 120/80. Healthy choices can help lower your blood pressure, or you may need medicine to help lower it.  What are the causes?  The cause of this condition is not known. Some conditions may be related to high blood pressure.  What increases the risk?  · Smoking.  · Having type 2 diabetes mellitus, high cholesterol, or both.  · Not getting enough exercise or physical activity.  · Being overweight.  · Having too much fat, sugar, calories, or salt (sodium) in your diet.  · Drinking too much alcohol.  · Having long-term (chronic) kidney disease.  · Having a family history of high blood pressure.  · Age. Risk increases with age.  · Race. You may be at higher risk if you are .  · Gender. Men are at higher risk than women before age 45. After age 65, women are at higher risk than men.  · Having obstructive sleep apnea.  · Stress.  What are the signs or symptoms?  · High blood pressure may not cause symptoms. Very high blood pressure (hypertensive crisis) may cause:  ? Headache.  ? Feelings of worry or nervousness (anxiety).  ? Shortness of breath.  ? Nosebleed.  ? A feeling of being sick to your stomach (nausea).  ? Throwing up (vomiting).  ? Changes in how you see.  ? Very bad chest pain.  ? Seizures.  How is this treated?  · This condition is treated by making healthy lifestyle changes, such as:  ? Eating healthy foods.  ? Exercising more.  ? Drinking less alcohol.  · Your health care provider may prescribe medicine if lifestyle changes are not enough to get your blood pressure under control, and if:  ? Your top number is above 130.  ? Your bottom number is above 80.  · Your personal target blood pressure may vary.  Follow these instructions at home:  Eating and drinking    · If told, follow the DASH eating plan. To follow this  plan:  ? Fill one half of your plate at each meal with fruits and vegetables.  ? Fill one fourth of your plate at each meal with whole grains. Whole grains include whole-wheat pasta, brown rice, and whole-grain bread.  ? Eat or drink low-fat dairy products, such as skim milk or low-fat yogurt.  ? Fill one fourth of your plate at each meal with low-fat (lean) proteins. Low-fat proteins include fish, chicken without skin, eggs, beans, and tofu.  ? Avoid fatty meat, cured and processed meat, or chicken with skin.  ? Avoid pre-made or processed food.  · Eat less than 1,500 mg of salt each day.  · Do not drink alcohol if:  ? Your doctor tells you not to drink.  ? You are pregnant, may be pregnant, or are planning to become pregnant.  · If you drink alcohol:  ? Limit how much you use to:  § 0-1 drink a day for women.  § 0-2 drinks a day for men.  ? Be aware of how much alcohol is in your drink. In the [place] one drink equals one 12 oz bottle of beer (355 mL), one 5 oz glass of wine (148 mL), or one 1½ oz glass of hard liquor (44 mL).  Lifestyle    · Work with your doctor to stay at a healthy weight or to lose weight. Ask your doctor what the best weight is for you.  · Get at least 30 minutes of exercise most days of the week. This may include walking, swimming, or biking.  · Get at least 30 minutes of exercise that strengthens your muscles (resistance exercise) at least 3 days a week. This may include lifting weights or doing Pilates.  · Do not use any products that contain nicotine or tobacco, such as cigarettes, e-cigarettes, and chewing tobacco. If you need help quitting, ask your doctor.  · Check your blood pressure at home as told by your doctor.  · Keep all follow-up visits as told by your doctor. This is important.  Medicines  · Take over-the-counter and prescription medicines only as told by your doctor. Follow directions carefully.  · Do not skip doses of blood pressure medicine. The medicine does not work as well  if you skip doses. Skipping doses also puts you at risk for problems.  · Ask your doctor about side effects or reactions to medicines that you should watch for.  Contact a doctor if you:  · Think you are having a reaction to the medicine you are taking.  · Have headaches that keep coming back (recurring).  · Feel dizzy.  · Have swelling in your ankles.  · Have trouble with your vision.  Get help right away if you:  · Get a very bad headache.  · Start to feel mixed up (confused).  · Feel weak or numb.  · Feel faint.  · Have very bad pain in your:  ? Chest.  ? Belly (abdomen).  · Throw up more than once.  · Have trouble breathing.  Summary  · Hypertension is another name for high blood pressure.  · High blood pressure forces your heart to work harder to pump blood.  · For most people, a normal blood pressure is less than 120/80.  · Making healthy choices can help lower blood pressure. If your blood pressure does not get lower with healthy choices, you may need to take medicine.  This information is not intended to replace advice given to you by your health care provider. Make sure you discuss any questions you have with your health care provider.  Document Released: 06/05/2009 Document Revised: 08/28/2019 Document Reviewed: 08/28/2019  Elsevier Patient Education © 2020 [employer] Inc[employer]

## 2021-08-10 NOTE — PROGRESS NOTES
Subjective:      [facility]  Destiny Burns is a 89 y.o.right handed female who presents to [facility] for evaluation of transient neurological symptoms.    She presented to [facility] on 7/15/2021 with sudden onset of speech difficulties.  She lives in an assisted living, the staff checked on her in the morning and noted that she was having difficulty getting words out.  Blood pressure on admission 209/102.  She feels that the symptoms lasted 1/2 day or so. She was seen @ [facility] in 8/2019 with similar speech symptoms and confusion (her blood pressure on admission that time was 99/67)  PMH includes HLD, HTN, vitamin D deficiency, mild depression, thrombocytopenia, heart murmur, reflux..  Social History:  denies history of smoking, alcohol or drugs. Lives in an assisted living.     ASA 81mg and Atorvastatin 40mg were added to her daily regime at discharge.    She is here with her sister today.  She ambulates with a walker, she has been using it for about 1 month after a fall.  She used a cane prior to that.  She is not taking Aspirin, states she has GERD..  Her medications are managed by [facility] where she lives.   I did get a copy of her MAR from her facility, she is taking aspirin. She does not check her blood pressure at home.  She is no longer on Atorvastatin per MAR, it looks like it may have been stopped by PCP? .Per primary care note,  she becomes symptomatic when her systolic blood pressure is below 120mm/mg, she has had several falls.      Review of Systems   Constitutional: Negative for fever.   HENT: Positive for hearing loss.    Eyes: Positive for blurred vision.   Respiratory: Positive for cough and shortness of breath.    Cardiovascular: Negative for chest pain and palpitations.   Gastrointestinal: Negative for heartburn, nausea and vomiting.   Genitourinary: Negative.    Musculoskeletal: Positive for falls and neck pain.   Skin: Negative for rash.   Neurological:  Positive for dizziness and headaches.   Endo/Heme/Allergies: Does not bruise/bleed easily.   Psychiatric/Behavioral: Negative for depression. The patient is nervous/anxious.      Current Outpatient Medications on File Prior to Visit   Medication Sig Dispense Refill   • aspirin EC (ECOTRIN) 81 MG Tablet Delayed Response Take 81 mg by mouth every day.     • Camphor-Menthol-Methyl Sal 3.1-6-10 % Patch Apply  topically.     • DULoxetine (CYMBALTA) 60 MG Cap DR Particles delayed-release capsule Take 1 capsule by mouth every morning. 30 capsule 5   • hydrALAZINE (APRESOLINE) 50 MG Tab Take 1 tablet by mouth 3 times a day. 120 tablet 0   • Simethicone (GAS RELIEF 125 MAX ST PO) Take 1 capsule by mouth every morning.     • acetaminophen (TYLENOL) 325 MG Tab Take 650 mg by mouth 3 times a day.     • docusate sodium (COLACE) 100 MG Cap Take 100 mg by mouth every morning.     • furosemide (LASIX) 20 MG Tab Take 20 mg by mouth every 48 hours.     • Multiple Vitamins-Minerals (PRESERVISION AREDS PO) Take 1 tablet by mouth 2 times a day.     • spironolactone (ALDACTONE) 25 MG Tab Take 25 mg by mouth every 48 hours.     • vitamin D (CHOLECALCIFEROL) 1000 Unit Tab Take 1,000 Units by mouth every day.     • diclofenac sodium 1 % Gel Apply 2 g topically 3 times a day.     • cloNIDine (CATAPRES) 0.1 MG/24HR PATCH WEEKLY patch APPLY 1 PATCH TOPICALLY EVERY SEVEN DAYS , REMOVE OLD PATCH BEFORE APPLYING NEW ONE (Patient taking differently: Place 1 Patch on the skin every 7 days.) 4 Patch 11   • valsartan (DIOVAN) 320 MG tablet Take 1 Tab by mouth every morning. 90 Tab 3   • pantoprazole (PROTONIX) 20 MG tablet TAKE 1 TABLET BY MOUTH TWICE A DAY (Patient taking differently: Take 20 mg by mouth 2 times a day.) 180 Tab 1   • doxazosin (CARDURA) 2 MG Tab Take 2 mg by mouth at bedtime.       No current facility-administered medications on file prior to visit.     Past Medical History:   Diagnosis Date   • Chronic venous insufficiency  "5/26/2016   • Hypertension 5/26/2016   • LLQ abdominal mass 5/26/2016   • Palpitations 5/26/2016   • WILLIAN (renal artery stenosis) ([facility]) 5/26/2016          Objective:   I personally reviewed imaging below and agree with the findings  MRI brain 7/15/2021  1 No acute abnormality.  2.  Mild cerebral volume loss.  3.  Chronic lacunar infarcts in the bilateral cerebellar hemisphere.  4.  Mild chronic microvascular ischemic disease.  5.  There has been no significant interval change.  CTA  head 7/15/2021  Petrocavernous and supraclinoid ICA segments are patent with calcified plaque.  MCA and MOISES branches are patent. The intradural vertebral, basilar and posterior cerebral arteries are patent. No occlusion or hemodynamically significant stenosis.  No   aneurysm or high flow vascular malformation.  CTA neck  1  Moderate calcified plaque at the bilateral carotid bifurcations and bulbs, right greater than left, with less than 50% stenosis by NASCET criteria.  2.  Limited evaluation of the mid cervical ICAs and the mid to distal vertebral arteries due to artifact from patient's extensive spinal fusion hardware as well as dental hardware.    TTE:  LVEF 70%, grade 1 diastolic dysfunction, LA size WNL, KAVITA 21  Labs:  Creat 0.75, LDL 93, A1C 5.9    Encounter Vitals  Standard Vitals  Vitals  Blood Pressure : 140/92  Temperature: 36.6 °C (97.8 °F)  Temp src: Temporal  Pulse: 82  Pulse Oximetry: 95 %  Height: 149.9 cm (4' 11\")  Weight: 55 kg (121 lb 4.1 oz)  Encounter Vitals  Temperature: 36.6 °C (97.8 °F)  Temp src: Temporal  Blood Pressure : 140/92  Pulse: 82  Pulse Oximetry: 95 %  Weight: 55 kg (121 lb 4.1 oz)  Height: 149.9 cm (4' 11\")  BMI (Calculated): 24.49             PHYSICAL ASSESSMENT  Constitutional:  Alert, no apparent distress,  Psych:   mood and affect WNL  Muskuloskeletal:  Moves all extremities equally, strength 5/5 bilaterally, no drift  NEUROLOGICAL ASSESSMENT  Oriented X 4, speech fluent, naming and memory " intact  CN II: Visual fields are full to confrontation. Fundoscopic exam is normal with sharp discs and no vascular changes. Pupils are 3 mm and briskly reactive to light.   CN III, IV, VI  EOMs intact, no ptosis  CN V: Facial sensation is intact to pinprick in all 3 divisions bilaterally. Corneal responses are intact.  CN VII: Face is symmetric with normal eye closure and smile.  CN VII: Hearing is normal to rubbing fingers  CN IX, X: Palate elevates symmetrically. Phonation is normal.  CN XI: Head turning and shoulder shrug are intact  CN XII: Tongue is midline with normal movements and no atrophy.                           Sensation to PP equal bilaterally                 No limb ataxia with finger to nose and heel to shin                 Ambulates with rolling walker                           Biceps,brachioradialis, tricep, patellar and ankle reflex all 2+     Cardiovascular:    S1S2, no abnormal rhythm auscultated, no peripheral edema  Neck:                     No carotid bruits noted . Severe kyphosis noted.  Pulmonary:            Respirations easy, lungs clear to auscultation all fields.     Skin:                     No obvious rashes.    Iniital NIHSS unknown      Current NIHSS    1a. LOC: 0  1b. LOC Questions: 0  1c. LOC Commands: 0  2. Best Gaze:0  3Visual Fields: 0  4. Facial Paresis: 0  5a. Motor arm left: 0  5b. Motor arm right: 0  6a. Motor leg left: 0  6b. Motor leg right: 0  7. Sensory: 0  8. Best Language: 0  9. Limb Ataxia: 0  10. Dysarthria: 0  11. Extinction/Inattention: 0    Total Score Current  0          Current mRS  0       Assessment/Plan:     1. Transient neurologic deficit  Possible TIA, interestingly, she has had 2 very similar episodes in 2 years.  One when she was hypotensive and one when she was hypertensive.  Her CTA did show some calcified plaque but no significant stenosis.  It is possible that she does have a subtle L MCA stenosis that may be the cause of recurrent symptoms--it  would explain why she would have similar events in the both the setting of hypo- and hypertension. . In any event, we should treat this as a TIA with secondary stroke prevention.       Stroke risk factors include HTN, prediabetes.    Continue Aspirin 81mg PO daily,  discussed side effects, signs of bleeding      2. Essential hypertension  Blood pressure goal less than 130/80, currently above goal, she gets very symptomatic with hypotension, her PCP is working with her to stablize.     3. Mixed hyperlipidemia  LDL goal < 70, current 93 start Rosuvastatin 20mg for secondary stroke prevention per SPARCL trial , discussed medication side effects, will need follow up with primary care evaluate liver function and intervals and refill  Exercise at least 30 minutes daily, avoid red meat, fried foods, butter, cheese.   Eat 5-6 servings of vegetables and fruits daily, choose lean white meat without skin (chicken, turkey, white fish)--baked, broiled or grilled.        4. Prediabetes  A1C  5.9, c/w prediabetes      If any new signs of stroke:  sudden weakness, numbness, speech difficulty (slurring or difficulty finding words), imbalance, incoordination, worse headache of life or vision loss occur, call 911.      Take all medications as prescribed unless instructed by your provider.       I spent a total of 58  minutes caring for patient,  my time includes counseling, review of systems, HPI and assessment, review of images, labs and testing as above.  I reviewed the hospital records, PMH, social and family history.   I have counseled patient on stroke prevention strategies, stroke symptoms and mimics.  Diet and exercise modifications.  We discussed medication side effects and instructions.       I have provided patient a written personalized stroke prevention plan, it is filed under the media tab under ‘[facility]”.    Follow up in 3 months

## 2021-08-12 NOTE — DOCUMENTATION QUERY
[facility]                                                                       Query Response Note      PATIENT:               MOSHE RON  ACCT #:                  [id number]  MRN:                     [id number]  :                      1930  ADMIT DATE:       7/15/2021 9:38 AM  DISCH DATE:        2021 11:17 AM  RESPONDING  PROVIDER #:        [id number]           QUERY TEXT:    Patient was admitted and worked up for aphasia. On the discharge summary, brain MRI findings were documented that it showed nothing acute, except for chronic lacunar infarct in the bilateral cerebellar hemisphere and mild chronic microvascular ischemic disease.    Likely TIA was documented.    Stroke-like symptoms were documented in the Discharge Diagnosis, after completed work-up, please specify if condition was:    The patient's Clinical Indicators include:  * Clinical indicators   Patient has expressive aphasia, and was hypertensive.    * Treatment or Monitoring  Neurology consulted by ERP  MRI brain w/o ordered   ASA, statin   Tele monitoring   Echo with bubble study   Lipid panel   MRI brain w/o ordered   ASA, statin   Tele monitoring   Echo with bubble study   Lipid panel   PT/OT/SLP consult     *Risk Factors  difficulty with single words  vessel occlusion or stenosis  occult arrhythmia/A. fib    's contact information  [email or web address]  Options provided:   -- CVA ruled out   -- CVA aborted   -- TIA   -- Unable to determine      Query created by: Estela Holley on 2021 10:53 AM    RESPONSE TEXT:    TIA          Electronically signed by:  DONNA SIMMONS MD 2021 12:25 PM

## 2021-08-17 PROBLEM — I73.9 PERIPHERAL VASCULAR DISEASE (HCC): Status: ACTIVE | Noted: 2021-08-13

## 2021-08-23 ENCOUNTER — HOSPITAL ENCOUNTER (OUTPATIENT)
Facility: MEDICAL CENTER | Age: 86
End: 2021-08-23
Attending: NURSE PRACTITIONER
Payer: MEDICARE

## 2021-08-23 DIAGNOSIS — I70.1 RAS (RENAL ARTERY STENOSIS) (HCC): ICD-10-CM

## 2021-08-23 DIAGNOSIS — E61.2 MAGNESIUM DEFICIENCY: ICD-10-CM

## 2021-08-23 DIAGNOSIS — R25.2 LEG CRAMPS: ICD-10-CM

## 2021-08-23 DIAGNOSIS — R29.818 TRANSIENT NEUROLOGIC DEFICIT: ICD-10-CM

## 2021-08-23 LAB
APPEARANCE UR: CLEAR
BACTERIA #/AREA URNS HPF: ABNORMAL /HPF
BILIRUB UR QL STRIP.AUTO: NEGATIVE
COLOR UR: YELLOW
GLUCOSE UR STRIP.AUTO-MCNC: NEGATIVE MG/DL
KETONES UR STRIP.AUTO-MCNC: NEGATIVE MG/DL
LEUKOCYTE ESTERASE UR QL STRIP.AUTO: ABNORMAL
MAGNESIUM SERPL-MCNC: 1.7 MG/DL (ref 1.5–2.5)
MICRO URNS: ABNORMAL
NITRITE UR QL STRIP.AUTO: NEGATIVE
PH UR STRIP.AUTO: 6 [PH] (ref 5–8)
PROT UR QL STRIP: 30 MG/DL
RBC UR QL AUTO: NEGATIVE
SP GR UR STRIP.AUTO: 1.02
UROBILINOGEN UR STRIP.AUTO-MCNC: 1 MG/DL
WBC #/AREA URNS HPF: ABNORMAL /HPF

## 2021-08-23 PROCEDURE — 81001 URINALYSIS AUTO W/SCOPE: CPT

## 2021-08-23 PROCEDURE — 83735 ASSAY OF MAGNESIUM: CPT

## 2021-08-23 PROCEDURE — 82607 VITAMIN B-12: CPT

## 2021-08-23 PROCEDURE — 82306 VITAMIN D 25 HYDROXY: CPT | Mod: GA

## 2021-08-24 LAB
25(OH)D3 SERPL-MCNC: 41 NG/ML (ref 30–100)
VIT B12 SERPL-MCNC: 282 PG/ML (ref 211–911)

## 2021-09-13 PROBLEM — M85.80 OSTEOPENIA: Status: RESOLVED | Noted: 2017-04-13 | Resolved: 2021-09-13

## 2021-09-13 PROBLEM — K40.90 INGUINAL HERNIA OF LEFT SIDE WITHOUT OBSTRUCTION OR GANGRENE: Status: RESOLVED | Noted: 2018-08-16 | Resolved: 2021-09-13

## 2021-09-13 PROBLEM — R42 VERTIGO: Status: RESOLVED | Noted: 2017-04-13 | Resolved: 2021-09-13

## 2021-09-21 PROBLEM — Z91.81 AT MODERATE RISK FOR FALL: Status: ACTIVE | Noted: 2021-09-20

## 2021-09-21 PROBLEM — R29.898 WEAKNESS OF BOTH LOWER EXTREMITIES: Status: ACTIVE | Noted: 2021-09-20

## 2021-11-07 PROBLEM — I63.9 RECURRENT CEREBROVASCULAR ACCIDENTS (CVAS) (HCC): Status: ACTIVE | Noted: 2021-10-22

## 2021-11-07 PROBLEM — R11.0 NAUSEA: Status: ACTIVE | Noted: 2021-10-22

## 2021-11-08 PROBLEM — F22 PARANOIA (HCC): Status: ACTIVE | Noted: 2021-11-05

## 2021-11-08 PROBLEM — F01.52 VASCULAR DEMENTIA WITH DELUSIONS (HCC): Status: ACTIVE | Noted: 2021-11-05

## 2021-11-23 ENCOUNTER — APPOINTMENT (OUTPATIENT)
Dept: RADIOLOGY | Facility: MEDICAL CENTER | Age: 86
End: 2021-11-23
Attending: EMERGENCY MEDICINE
Payer: MEDICARE

## 2021-11-23 ENCOUNTER — HOSPITAL ENCOUNTER (EMERGENCY)
Facility: MEDICAL CENTER | Age: 86
End: 2021-11-23
Attending: EMERGENCY MEDICINE
Payer: MEDICARE

## 2021-11-23 VITALS
TEMPERATURE: 98 F | SYSTOLIC BLOOD PRESSURE: 202 MMHG | WEIGHT: 120 LBS | HEART RATE: 66 BPM | OXYGEN SATURATION: 93 % | DIASTOLIC BLOOD PRESSURE: 99 MMHG | RESPIRATION RATE: 18 BRPM | BODY MASS INDEX: 24.24 KG/M2

## 2021-11-23 DIAGNOSIS — S41.111A LACERATION OF RIGHT UPPER EXTREMITY, INITIAL ENCOUNTER: ICD-10-CM
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
## 2021-11-23 DIAGNOSIS — S09.90XA CLOSED HEAD INJURY, INITIAL ENCOUNTER: ICD-10-CM

## 2021-11-23 PROCEDURE — 90715 TDAP VACCINE 7 YRS/> IM: CPT | Performed by: EMERGENCY MEDICINE

## 2021-11-23 PROCEDURE — 700102 HCHG RX REV CODE 250 W/ 637 OVERRIDE(OP): Performed by: EMERGENCY MEDICINE

## 2021-11-23 PROCEDURE — 700111 HCHG RX REV CODE 636 W/ 250 OVERRIDE (IP): Performed by: EMERGENCY MEDICINE

## 2021-11-23 PROCEDURE — 70450 CT HEAD/BRAIN W/O DYE: CPT | Mod: MG

## 2021-11-23 PROCEDURE — 700101 HCHG RX REV CODE 250: Performed by: EMERGENCY MEDICINE

## 2021-11-23 PROCEDURE — 304217 HCHG IRRIGATION SYSTEM

## 2021-11-23 PROCEDURE — 72125 CT NECK SPINE W/O DYE: CPT | Mod: ME

## 2021-11-23 PROCEDURE — 99284 EMERGENCY DEPT VISIT MOD MDM: CPT

## 2021-11-23 PROCEDURE — 90471 IMMUNIZATION ADMIN: CPT

## 2021-11-23 PROCEDURE — 303747 HCHG EXTRA SUTURE

## 2021-11-23 PROCEDURE — 73060 X-RAY EXAM OF HUMERUS: CPT | Mod: RT

## 2021-11-23 PROCEDURE — A9270 NON-COVERED ITEM OR SERVICE: HCPCS | Performed by: EMERGENCY MEDICINE

## 2021-11-23 PROCEDURE — 304999 HCHG REPAIR-SIMPLE/INTERMED LEVEL 1

## 2021-11-23 RX ORDER — CEPHALEXIN 500 MG/1
500 CAPSULE ORAL 3 TIMES DAILY
Qty: 21 CAPSULE | Refills: 0 | Status: SHIPPED | OUTPATIENT
Start: 2021-11-23 | End: 2021-11-30

## 2021-11-23 RX ORDER — CEPHALEXIN 500 MG/1
500 CAPSULE ORAL 3 TIMES DAILY
Qty: 21 CAPSULE | Refills: 0 | Status: SHIPPED | OUTPATIENT
Start: 2021-11-23 | End: 2021-11-23 | Stop reason: SDUPTHER

## 2021-11-23 RX ORDER — SIMETHICONE 125 MG
125 TABLET,CHEWABLE ORAL DAILY
Status: SHIPPED | COMMUNITY
End: 2022-02-21

## 2021-11-23 RX ORDER — CEPHALEXIN 500 MG/1
500 CAPSULE ORAL ONCE
Status: COMPLETED | OUTPATIENT
Start: 2021-11-23 | End: 2021-11-23

## 2021-11-23 RX ORDER — LIDOCAINE HYDROCHLORIDE 20 MG/ML
20 INJECTION, SOLUTION INFILTRATION; PERINEURAL ONCE
Status: COMPLETED | OUTPATIENT
Start: 2021-11-23 | End: 2021-11-23

## 2021-11-23 RX ADMIN — CEPHALEXIN 500 MG: 500 CAPSULE ORAL at 14:15

## 2021-11-23 RX ADMIN — CLOSTRIDIUM TETANI TOXOID ANTIGEN (FORMALDEHYDE INACTIVATED), CORYNEBACTERIUM DIPHTHERIAE TOXOID ANTIGEN (FORMALDEHYDE INACTIVATED), BORDETELLA PERTUSSIS TOXOID ANTIGEN (GLUTARALDEHYDE INACTIVATED), BORDETELLA PERTUSSIS FILAMENTOUS HEMAGGLUTININ ANTIGEN (FORMALDEHYDE INACTIVATED), BORDETELLA PERTUSSIS PERTACTIN ANTIGEN, AND BORDETELLA PERTUSSIS FIMBRIAE 2/3 ANTIGEN 0.5 ML: 5; 2; 2.5; 5; 3; 5 INJECTION, SUSPENSION INTRAMUSCULAR at 12:25

## 2021-11-23 RX ADMIN — LIDOCAINE HYDROCHLORIDE 20 ML: 20 INJECTION, SOLUTION INFILTRATION; PERINEURAL at 12:45

## 2021-11-23 ASSESSMENT — ENCOUNTER SYMPTOMS
WEAKNESS: 0
LOSS OF CONSCIOUSNESS: 0
NECK PAIN: 0

## 2021-11-23 ASSESSMENT — FIBROSIS 4 INDEX: FIB4 SCORE: 4.17

## 2021-11-23 NOTE — ED NOTES
Pt up for road test. Pt able to ambulate w/steady gait w/walker around nurses station. Pt states she feels slightly unsteady but not dizzy

## 2021-11-23 NOTE — ED PROVIDER NOTES
ED Provider Note    Scribed for Roe Mcclure M.D. by Juan Oglesby. 11/23/2021, 11:33 AM.    Primary care provider: RICHA Castro  Means of arrival: EMS  History obtained from: Patient  History limited by: None    CHIEF COMPLAINT  Chief Complaint   Patient presents with   • T-5000 GLF     BIB EMS, pt had GLF while getting her hair done at salon, stool and fell striking her right head and neck -LOC, + blood thinners       HPI  Destiny Burns is a 89 y.o. female who presents to the Emergency Department via EMS as a code TBI. Per EMS the patient had a ground level fall at a salon prior to arrival. She was trying to sit down when she missed the chair and fell.  The patient had been sitting in a salon chair for more than an hour having her hair done.  She got up and initially felt okay but she walked across room started feeling dizzy.  She fell to the ground hitting her right arm and the right side of her head.  She hit her right elbow and the right side of her head. EMS notes a small 2 inch laceration on her elbow and a bump in her head.  She did not get knocked out.  She denies any injury to her hips.  Denies any injury to her extremities other than the laceration.  No numbness or tingling or weakness.  Denies any injury to chest or abdomen.  She is currently taking aspirin. She has a history of chronic neck pain, and denies any abnormal neck pain. She denies any other pain, numbness, or weakness.       REVIEW OF SYSTEMS  Review of Systems   Musculoskeletal: Positive for joint pain. Negative for neck pain.   Neurological: Negative for loss of consciousness and weakness.        Negative for numbness.   All other systems reviewed and are negative.      PAST MEDICAL HISTORY   has a past medical history of Chronic venous insufficiency (5/26/2016), Dyslipidemia (5/26/2016), Generalized abdominal pain (7/21/2016), Hypertension (5/26/2016), Inguinal hernia of left side without obstruction or gangrene  (8/16/2018), LLQ abdominal mass (5/26/2016), Osteopenia (4/13/2017), Palpitations (5/26/2016), WILLIAN (renal artery stenosis) (HCC) (5/26/2016), and Vertigo (4/13/2017).    SURGICAL HISTORY  patient denies any surgical history    SOCIAL HISTORY  Social History     Tobacco Use   • Smoking status: Never Smoker   • Smokeless tobacco: Never Used   Substance Use Topics   • Alcohol use: No     Alcohol/week: 0.0 oz   • Drug use: No      Social History     Substance and Sexual Activity   Drug Use No       FAMILY HISTORY  Family History   Problem Relation Age of Onset   • Cancer Mother         ORAL   • Cancer Other         AUNT   • Cancer Other         SKIN   • Diabetes Father        CURRENT MEDICATIONS  Home Medications     Reviewed by Diamond Cyr R.N. (Registered Nurse) on 11/23/21 at 1141  Med List Status: Partial   Medication Last Dose Status   acetaminophen (TYLENOL) 325 MG Tab  Active   aspirin EC (ECOTRIN) 81 MG Tablet Delayed Response  Active   Camphor-Menthol-Methyl Sal 3.1-6-10 % Patch  Active   Capsaicin 0.025 % Pads  Active   cloNIDine (CATAPRES) 0.1 MG/24HR PATCH WEEKLY patch  Active   diclofenac sodium 1 % Gel  Active   docusate sodium (COLACE) 100 MG Cap  Active   doxazosin (CARDURA) 2 MG Tab  Active   DULoxetine (CYMBALTA) 60 MG Cap DR Particles delayed-release capsule  Active   furosemide (LASIX) 20 MG Tab  Active   Multiple Vitamins-Minerals (PRESERVISION AREDS PO)  Active   ondansetron (ZOFRAN) 4 MG Tab tablet  Active   pantoprazole (PROTONIX) 20 MG tablet  Active   rosuvastatin (CRESTOR) 20 MG Tab  Active   Simethicone (GAS RELIEF 125 MAX ST PO)  Active   spironolactone (ALDACTONE) 25 MG Tab  Active   valsartan (DIOVAN) 320 MG tablet  Active   vitamin D (CHOLECALCIFEROL) 1000 Unit Tab  Active                ALLERGIES  Allergies   Allergen Reactions   • Acyclovir    • Ciprofloxacin    • Citalopram    • Lyrica    • Neurontin [Gabapentin]    • Morphine    • Tramadol      Drowsy, dizzy, depression   •  Bowen Itching     Itchy throat   • Norvasc [Amlodipine]        PHYSICAL EXAM  VITAL SIGNS: BP (!) 211/103   Pulse 73   Temp 35.9 °C (96.7 °F)   Resp 16   Wt 54.4 kg (120 lb)   SpO2 93%   BMI 24.24 kg/m²   Vitals reviewed.  Constitutional: Well developed, Well nourished, No acute distress, Non-toxic appearance.   HENT: Normocephalic, small contusion to the right side of the head, Bilateral external ears normal, Oropharynx moist, No oral exudates, Nose normal.   Eyes: PERRL, EOMI, Conjunctiva normal, No discharge.   Neck: Normal range of motion, No tenderness,   Cardiovascular: Normal heart rate, Normal rhythm, No murmurs, No rubs, No gallops.   Thorax & Lungs: Normal breath sounds, No respiratory distress, No wheezing,  Abdomen: Bowel sounds normal, Soft, No tenderness  Skin: Warm, Dry, No erythema, No rash.   Back: No tenderness, No CVA tenderness.   Musculoskeletal: Good range of motion in all major joints.  3 cm laceration the posterior aspect of the right arm.  This is deep into the soft tissue.  No muscle involvement is noted.  Normal neurovascular exam.  Moderate bilateral lower extremity edema  Neurologic: Alert, No focal deficits noted.   Psychiatric: Affect normal      RADIOLOGY  DX-HUMERUS 2+ RIGHT   Final Result      1.  No fracture or dislocation is seen.   2.  Soft tissue swelling and soft tissue air likely related to a laceration.      CT-HEAD W/O   Final Result      1.  No acute intracranial process.      2.  Atrophy and small vessel ischemic change.      CT-CSPINE WITHOUT PLUS RECONS   Final Result      1.  Extensive postsurgical changes in the cervical spine.   2.  Grade 1 anterolisthesis of C3 on C4, C4 on C5 and C5 on C6 which is unchanged.   3.  Demineralization which limits evaluation.   4.  Degenerative changes as above described.   5.  Bilateral carotid atherosclerotic plaque.   6.  4 mm left upper lobe pulmonary nodule.      Fleischner Society pulmonary nodule recommendations:   Low  Risk: No routine follow-up      High Risk: Optional CT at 12 months      Comments: Nodules less than 6 mm do not require routine follow-up, but certain patients at high risk with suspicious nodule morphology, upper lobe location, or both may warrant 12-month follow-up.      Low Risk - Minimal or absent history of smoking and of other known risk factors.      High Risk - History of smoking or of other known risk factors.      Note: These recommendations do not apply to lung cancer screening, patients with immunosuppression, or patients with known primary cancer.      Fleischner Society 2017 Guidelines for Management of Incidentally Detected Pulmonary Nodules in Adults        The radiologist's interpretation of all radiological studies have been reviewed by me.      Laceration Repair Procedure Note    Indication: Laceration    Procedure: The patient was placed in the appropriate position and anesthesia around the laceration was obtained by infiltration using 1% Lidocaine without epinephrine. The area was then cleansed with betadine and draped in a sterile fashion, irrigated with normal saline and explored with no foreign bodies discovered. The laceration was closed with 2-0 Ethilon using interrupted sutures, and a total of 3 vertical mattress sutures.  There were no additional lacerations requiring repair. The wound area was then dressed with bacitracin.      Total repaired wound length: 13 cm.     Other Items: Suture count: 6    The patient tolerated the procedure well.    Complications: None          COURSE & MEDICAL DECISION MAKING  Pertinent Labs & Imaging studies reviewed. (See chart for details)    Obtained and reviewed past medical records from previous visit for baseline labs and previous work-up.    11:33 AM Patient seen and examined at bedside. The patient presents with TBI, and the differential diagnosis includes but is not limited to skin laceration, arm fracture, closed head injury, cervical spine injury.  Ordered for CT head without and CT Cspine without plus recons to evaluate. Patient will be treated with lidocaine 2% injection 20 mL and Adacel injection 0.5 mL for her symptoms.      Patient was a code TBI.  She is sent over for head and neck CTs.  These are negative for acute pathology.  There is significant degenerative change in the patient's spine.  She has no midline tenderness in thoracic or lumbar spine I do not feel she needs images.  She has no chest or abdominal tenderness no tenderness over the right hip or pelvis.  No indication for further imaging.    She is a large laceration the posterior aspect of her right arm.  X-ray of the humerus was obtained this appears negative except for evidence of the laceration.  Laceration is cleaned and closed as above.  Apparently patient's tetanus shot is up-to-date she is given a tetanus shot.  She started on oral Keflex.      The patient able to ambulate.  She be discharged home to follow-up with her doctor.  This does not sound like exertional syncope.  It sounds like it was likely orthostatic from standing up after sitting for an hour that she got a little bit dizzy.  She otherwise awake alert and at baseline.    Daria Connelly A.P.R.N.  [address] [facility] [address]  [phone]    Schedule an appointment as soon as possible for a visit in 2 days      Patient is known to have elevated blood pressures counseled follow-up with her doctor this returned within a week.  Sutures out in 10 days, here or your doctor.  Questions are answered, agreeable to plan and she is discharged in good condition.      FINAL IMPRESSION  1. Fall, initial encounter    2. Closed head injury, initial encounter    3. Laceration of right upper extremity, initial encounter          Juan RIVERA (Luke), am scribing for, and in the presence of, Roe Mcclure M.D..    Electronically signed by: Juan Ribera), 11/23/2021    Roe RIVERA M.D. personally performed  the services described in this documentation, as scribed by Juan Oglesby in my presence, and it is both accurate and complete. C.     The note accurately reflects work and decisions made by me.  Roe Mcclure M.D.  11/23/2021  3:15 PM

## 2021-11-23 NOTE — ED NOTES
..Pt verbalizes understanding of dc instructions. Sister at bedside is driving her home.  Rx given.  Pt states all questions have been answered and they feel comfortable with dc instructions provided. Pt states has all personal belonging. Pt to lobby  Via w/c w/o incident

## 2021-11-23 NOTE — ED TRIAGE NOTES
Chief Complaint   Patient presents with   • T-5000 GLF     BIB EMS, pt had GLF while getting her hair done at salon, stool and fell striking her right head and neck -LOC, + blood thinners     TBI initiated on arrival. Pt to CT then Blue 18.  Pt report that when she landed on the floor she cut her elbow. Large full thickness lac to posterior right arm noted. GCS 15.  Sister at bedside.

## 2021-11-23 NOTE — DISCHARGE INSTRUCTIONS
Keep wound clean and dry.  Watch for signs of infection.  Return for pain, swelling, redness or other concerns.  Follow-up with your doctor.  Sutures out in 10 days here or your doctor.  Antibiotics as prescribed

## 2022-03-10 ENCOUNTER — APPOINTMENT (OUTPATIENT)
Dept: RADIOLOGY | Facility: MEDICAL CENTER | Age: 87
End: 2022-03-10
Attending: EMERGENCY MEDICINE
Payer: MEDICARE

## 2022-03-10 ENCOUNTER — HOSPITAL ENCOUNTER (EMERGENCY)
Facility: MEDICAL CENTER | Age: 87
End: 2022-03-10
Attending: EMERGENCY MEDICINE
Payer: MEDICARE

## 2022-03-10 VITALS
TEMPERATURE: 100.1 F | RESPIRATION RATE: 16 BRPM | OXYGEN SATURATION: 99 % | HEART RATE: 86 BPM | WEIGHT: 112 LBS | DIASTOLIC BLOOD PRESSURE: 52 MMHG | BODY MASS INDEX: 22.58 KG/M2 | SYSTOLIC BLOOD PRESSURE: 144 MMHG | HEIGHT: 59 IN
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
## 2022-03-10 DIAGNOSIS — S09.90XA CLOSED HEAD INJURY, INITIAL ENCOUNTER: ICD-10-CM

## 2022-03-10 LAB
ALBUMIN SERPL BCP-MCNC: 4.3 G/DL (ref 3.2–4.9)
ALBUMIN/GLOB SERPL: 1.5 G/DL
ALP SERPL-CCNC: 93 U/L (ref 30–99)
ALT SERPL-CCNC: 11 U/L (ref 2–50)
ANION GAP SERPL CALC-SCNC: 11 MMOL/L (ref 7–16)
AST SERPL-CCNC: 24 U/L (ref 12–45)
BASOPHILS # BLD AUTO: 0.3 % (ref 0–1.8)
BASOPHILS # BLD: 0.02 K/UL (ref 0–0.12)
BILIRUB SERPL-MCNC: 0.5 MG/DL (ref 0.1–1.5)
BUN SERPL-MCNC: 26 MG/DL (ref 8–22)
CALCIUM SERPL-MCNC: 9.6 MG/DL (ref 8.5–10.5)
CHLORIDE SERPL-SCNC: 107 MMOL/L (ref 96–112)
CO2 SERPL-SCNC: 22 MMOL/L (ref 20–33)
CREAT SERPL-MCNC: 1.04 MG/DL (ref 0.5–1.4)
EOSINOPHIL # BLD AUTO: 0.24 K/UL (ref 0–0.51)
EOSINOPHIL NFR BLD: 4 % (ref 0–6.9)
ERYTHROCYTE [DISTWIDTH] IN BLOOD BY AUTOMATED COUNT: 47.9 FL (ref 35.9–50)
GLOBULIN SER CALC-MCNC: 2.9 G/DL (ref 1.9–3.5)
GLUCOSE SERPL-MCNC: 80 MG/DL (ref 65–99)
HCT VFR BLD AUTO: 43.1 % (ref 37–47)
HGB BLD-MCNC: 13.2 G/DL (ref 12–16)
IMM GRANULOCYTES # BLD AUTO: 0.03 K/UL (ref 0–0.11)
IMM GRANULOCYTES NFR BLD AUTO: 0.5 % (ref 0–0.9)
LYMPHOCYTES # BLD AUTO: 1.6 K/UL (ref 1–4.8)
LYMPHOCYTES NFR BLD: 26.7 % (ref 22–41)
MCH RBC QN AUTO: 29.4 PG (ref 27–33)
MCHC RBC AUTO-ENTMCNC: 30.6 G/DL (ref 33.6–35)
MCV RBC AUTO: 96 FL (ref 81.4–97.8)
MONOCYTES # BLD AUTO: 0.54 K/UL (ref 0–0.85)
MONOCYTES NFR BLD AUTO: 9 % (ref 0–13.4)
NEUTROPHILS # BLD AUTO: 3.56 K/UL (ref 2–7.15)
NEUTROPHILS NFR BLD: 59.5 % (ref 44–72)
NRBC # BLD AUTO: 0 K/UL
NRBC BLD-RTO: 0 /100 WBC
PLATELET # BLD AUTO: 143 K/UL (ref 164–446)
PMV BLD AUTO: 9.9 FL (ref 9–12.9)
POTASSIUM SERPL-SCNC: 4 MMOL/L (ref 3.6–5.5)
PROT SERPL-MCNC: 7.2 G/DL (ref 6–8.2)
RBC # BLD AUTO: 4.49 M/UL (ref 4.2–5.4)
SODIUM SERPL-SCNC: 140 MMOL/L (ref 135–145)
TROPONIN T SERPL-MCNC: 29 NG/L (ref 6–19)
WBC # BLD AUTO: 6 K/UL (ref 4.8–10.8)

## 2022-03-10 PROCEDURE — 80053 COMPREHEN METABOLIC PANEL: CPT

## 2022-03-10 PROCEDURE — 71045 X-RAY EXAM CHEST 1 VIEW: CPT

## 2022-03-10 PROCEDURE — 84484 ASSAY OF TROPONIN QUANT: CPT

## 2022-03-10 PROCEDURE — 85025 COMPLETE CBC W/AUTO DIFF WBC: CPT

## 2022-03-10 PROCEDURE — 99284 EMERGENCY DEPT VISIT MOD MDM: CPT | Mod: 25

## 2022-03-10 PROCEDURE — 36415 COLL VENOUS BLD VENIPUNCTURE: CPT

## 2022-03-10 PROCEDURE — 70450 CT HEAD/BRAIN W/O DYE: CPT | Mod: MG

## 2022-03-10 ASSESSMENT — LIFESTYLE VARIABLES: DO YOU DRINK ALCOHOL: NO

## 2022-03-10 ASSESSMENT — FIBROSIS 4 INDEX: FIB4 SCORE: 4.17

## 2022-03-11 NOTE — DISCHARGE PLANNING
[phone]  from [facility] called stating Pt is on their services. Pt has a POLST for comfort measures only.     Pt was sent to the ED by  Staff at [facility] after an unwitnessed fall. Hospice was not contacted prior to the staff calling for Emergent transfer.     KARMEN has left a message for Pt sister Margarita at [phone]    KARMEN has updated RN and ERP    Once Pt is medically cleared Pt will be transferred back to [facility].

## 2022-03-11 NOTE — ED NOTES
Pt able to ambulate from bed to bathroom and back using own wheeled walker with minimal assistance. ERP made aware.

## 2022-03-11 NOTE — ED PROVIDER NOTES
ED Provider Note    CHIEF COMPLAINT  Chief Complaint   Patient presents with   • GLF     Pt was returning to her rm after lunch, felt dizzy, then fell. + head strike, - LOC, + asa. Per facility staff, pt ambulates well with FWW per baseline, but pt was unable to stand on scene with EMS. C/o R rib pain on scene.        HPI  Destiny Burns is a 89 y.o. female who presents for evaluation of an apparent ground-level fall, the patient comes in from a nursing home where she was found on the floor by her door.  She takes daily aspirin, not anticoagulated.  The patient apparently has a history of dementia but is at her baseline mental status.  She states that she just felt weak causing her fall.  She reports that she did strike her head, did not lose consciousness, does not have a headache, has no focal weakness numbness or tingling.  The only complaint the patient offers is of right-sided chest wall pain.  No shortness of breath.  Denies any acute neck or back pain, no visual changes, no other acute complaints.    REVIEW OF SYSTEMS  Negative for fever, rash, dyspnea, abdominal pain, nausea, vomiting, diarrhea, headache, focal weakness, focal numbness, focal tingling, back pain. All other systems are negative.     PAST MEDICAL HISTORY  Past Medical History:   Diagnosis Date   • Chronic venous insufficiency 5/26/2016   • Dyslipidemia 5/26/2016   • Generalized abdominal pain 7/21/2016   • Hypertension 5/26/2016   • Inguinal hernia of left side without obstruction or gangrene 8/16/2018   • LLQ abdominal mass 5/26/2016   • Osteopenia 4/13/2017   • Palpitations 5/26/2016   • WILLIAN (renal artery stenosis) (HCC) 5/26/2016   • Vertigo 4/13/2017       FAMILY HISTORY  Family History   Problem Relation Age of Onset   • Cancer Mother         ORAL   • Cancer Other         AUNT   • Cancer Other         SKIN   • Diabetes Father        SOCIAL HISTORY  Social History     Tobacco Use   • Smoking status: Never Smoker   • Smokeless tobacco:  "Never Used   Substance Use Topics   • Alcohol use: No     Alcohol/week: 0.0 oz   • Drug use: No       SURGICAL HISTORY  No past surgical history on file.    CURRENT MEDICATIONS  I personally reviewed the medication list in the charting documentation.     ALLERGIES  Allergies   Allergen Reactions   • Acyclovir    • Ciprofloxacin    • Citalopram    • Lyrica    • Neurontin [Gabapentin]    • Morphine    • Tramadol      Drowsy, dizzy, depression   • Newberry Itching     Itchy throat   • Norvasc [Amlodipine]        MEDICAL RECORD  I have reviewed patient's medical record and pertinent results are listed above.      PHYSICAL EXAM  VITAL SIGNS: /52   Pulse 86   Temp 37.8 °C (100.1 °F) (Temporal)   Resp 16   Ht 1.499 m (4' 11\")   Wt 50.8 kg (112 lb)   SpO2 99%   BMI 22.62 kg/m²    Constitutional: Elderly, awake and alert, no acute distress, not toxic in appearance  HENT: Normocephalic, abrasion to left forehead  Eyes: No scleral icterus. Normal conjunctiva pupils are equal and reactive  Neck: Comfortable movement without any obvious restriction in the range of motion.  No tenderness  Cardiovascular: Upon ascultation I appreciate a regular heart rhythm and a normal rate.   Thorax & Lungs: Normal nonlabored respirations.  Minimal tenderness of the right chest wall without crepitation or deformity appreciated.  Symmetric breath sounds bilaterally.  Abdomen: The abdomen is not visibly distended. Upon palpation, I find it to be without tenderness.  No mass appreciated.  Skin: The exposed portions of skin reveal no obvious rash or other abnormalities.  Extremities/Musculoskeletal: No obvious sign of acute trauma. No asymmetric calf tenderness or edema.   Neurologic: Alert, moves all 4 extremities with equal strength  Psychiatric: Normal affect appropriate for the clinical situation.    DIAGNOSTIC STUDIES / PROCEDURES    LABS/EKGs  Results for orders placed or performed during the hospital encounter of 03/10/22   CBC " WITH DIFFERENTIAL   Result Value Ref Range    WBC 6.0 4.8 - 10.8 K/uL    RBC 4.49 4.20 - 5.40 M/uL    Hemoglobin 13.2 12.0 - 16.0 g/dL    Hematocrit 43.1 37.0 - 47.0 %    MCV 96.0 81.4 - 97.8 fL    MCH 29.4 27.0 - 33.0 pg    MCHC 30.6 (L) 33.6 - 35.0 g/dL    RDW 47.9 35.9 - 50.0 fL    Platelet Count 143 (L) 164 - 446 K/uL    MPV 9.9 9.0 - 12.9 fL    Neutrophils-Polys 59.50 44.00 - 72.00 %    Lymphocytes 26.70 22.00 - 41.00 %    Monocytes 9.00 0.00 - 13.40 %    Eosinophils 4.00 0.00 - 6.90 %    Basophils 0.30 0.00 - 1.80 %    Immature Granulocytes 0.50 0.00 - 0.90 %    Nucleated RBC 0.00 /100 WBC    Neutrophils (Absolute) 3.56 2.00 - 7.15 K/uL    Lymphs (Absolute) 1.60 1.00 - 4.80 K/uL    Monos (Absolute) 0.54 0.00 - 0.85 K/uL    Eos (Absolute) 0.24 0.00 - 0.51 K/uL    Baso (Absolute) 0.02 0.00 - 0.12 K/uL    Immature Granulocytes (abs) 0.03 0.00 - 0.11 K/uL    NRBC (Absolute) 0.00 K/uL   COMP METABOLIC PANEL   Result Value Ref Range    Sodium 140 135 - 145 mmol/L    Potassium 4.0 3.6 - 5.5 mmol/L    Chloride 107 96 - 112 mmol/L    Co2 22 20 - 33 mmol/L    Anion Gap 11.0 7.0 - 16.0    Glucose 80 65 - 99 mg/dL    Bun 26 (H) 8 - 22 mg/dL    Creatinine 1.04 0.50 - 1.40 mg/dL    Calcium 9.6 8.5 - 10.5 mg/dL    AST(SGOT) 24 12 - 45 U/L    ALT(SGPT) 11 2 - 50 U/L    Alkaline Phosphatase 93 30 - 99 U/L    Total Bilirubin 0.5 0.1 - 1.5 mg/dL    Albumin 4.3 3.2 - 4.9 g/dL    Total Protein 7.2 6.0 - 8.2 g/dL    Globulin 2.9 1.9 - 3.5 g/dL    A-G Ratio 1.5 g/dL   TROPONIN   Result Value Ref Range    Troponin T 29 (H) 6 - 19 ng/L   ESTIMATED GFR   Result Value Ref Range    GFR If African American >60 >60 mL/min/1.73 m 2    GFR If Non African American 50 (A) >60 mL/min/1.73 m 2        RADIOLOGY  DX-CHEST-LIMITED (1 VIEW)   Final Result      No acute cardiopulmonary abnormality identified.      CT-HEAD W/O   Final Result      1. No CT evidence of acute infarct, hemorrhage or mass.   2. Mild global parenchymal atrophy. Chronic  small vessel ischemic changes.            COURSE & MEDICAL DECISION MAKING  I have reviewed any medical record information, laboratory studies and radiographic results as noted above.  Differential diagnoses includes: ICH, dehydration, electrolyte abnormalities, anemia, ACS, chest wall injury    Encounter Summary: This is a very pleasant 89 y.o. female who unfortunately required evaluation in the emergency department today with a ground-level fall secondary to weakness, head injury, and apparent right-sided chest wall injury as well.  She offers no other complaints, no focal neurologic findings on exam.  She is triage as a code TBI prehospital as she is on aspirin.  She was evaluated at the charge desk emergently.  She will be brought to CT scan for a head CT, blood work will be obtained and ultimately she will be reevaluated ------- CT scan reveals no acute traumatic injury.  Blood work reveals a chronically elevated troponin and minimal kidney injury.  After the work-up was already initiated hospice contacted our case management department and notified us that she is a hospice patient.  Patient is ambulating independently here in the emergency department.  She is discharged home in stable condition      DISPOSITION: Discharged home in stable condition      FINAL IMPRESSION  1. Fall, initial encounter    2. Closed head injury, initial encounter           This dictation was created using voice recognition software. The accuracy of the dictation is limited to the abilities of the software. I expect there may be some errors of grammar and possibly content. The nursing notes were reviewed and certain aspects of this information were incorporated into this note.    Electronically signed by: Brent Tucker M.D., 3/10/2022 5:36 PM
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
## 2022-03-21 PROBLEM — R64 CACHEXIA (HCC): Status: ACTIVE | Noted: 2022-03-14

## 2025-04-01 NOTE — PATIENT INSTRUCTIONS
Get labs done  Contact dr bloch about blood pressures low.  Begin lidocaine patches for pain  Discuss treatment of osteopenia next visit  Consider pain psychologist     show